# Patient Record
Sex: FEMALE | Race: WHITE | NOT HISPANIC OR LATINO | Employment: FULL TIME | URBAN - METROPOLITAN AREA
[De-identification: names, ages, dates, MRNs, and addresses within clinical notes are randomized per-mention and may not be internally consistent; named-entity substitution may affect disease eponyms.]

---

## 2018-07-13 ENCOUNTER — OFFICE VISIT (OUTPATIENT)
Dept: ORTHOPEDICS | Facility: CLINIC | Age: 58
End: 2018-07-13
Payer: COMMERCIAL

## 2018-07-13 VITALS — HEIGHT: 71 IN | WEIGHT: 199 LBS | BODY MASS INDEX: 27.86 KG/M2

## 2018-07-13 DIAGNOSIS — S52.552A OTHER CLOSED EXTRA-ARTICULAR FRACTURE OF DISTAL END OF LEFT RADIUS, INITIAL ENCOUNTER: Primary | ICD-10-CM

## 2018-07-13 DIAGNOSIS — S93.492A SPRAIN OF ANTERIOR TALOFIBULAR LIGAMENT OF LEFT ANKLE, INITIAL ENCOUNTER: ICD-10-CM

## 2018-07-13 PROCEDURE — 25600 CLTX DST RDL FX/EPHYS SEP WO: CPT | Mod: ,,, | Performed by: ORTHOPAEDIC SURGERY

## 2018-07-13 PROCEDURE — 3008F BODY MASS INDEX DOCD: CPT | Mod: ,,, | Performed by: ORTHOPAEDIC SURGERY

## 2018-07-13 PROCEDURE — 99203 OFFICE O/P NEW LOW 30 MIN: CPT | Mod: 25,,, | Performed by: ORTHOPAEDIC SURGERY

## 2018-07-13 RX ORDER — VITAMIN E 268 MG
400 CAPSULE ORAL DAILY
COMMUNITY

## 2018-07-13 RX ORDER — CHOLECALCIFEROL (VITAMIN D3) 50 MCG
1 TABLET ORAL DAILY
COMMUNITY

## 2018-07-13 NOTE — PROGRESS NOTES
History reviewed. No pertinent past medical history.    Past Surgical History:   Procedure Laterality Date    BREAST BIOPSY Right      SECTION      GALLBLADDER SURGERY  2011    KIDNEY STONE SURGERY  2006    [place] DILATION/CURETTAGE,DIAGNOSTIC      D & C times 2    SHOULDER SURGERY Right 2010    Reconstruction Dr. Madrigal       Current Outpatient Prescriptions   Medication Sig    cholecalciferol, vitamin D3, (VITAMIN D3) 2,000 unit Tab Take by mouth once daily.    cyanocobalamin-cobamamide (B12) 5,000-100 mcg Lozg Place under the tongue.    FLAXSEED OIL MISC by Misc.(Non-Drug; Combo Route) route.    multivitamin with minerals (HAIR,SKIN AND NAILS ORAL) Take by mouth.    ranitidine (ZANTAC) 150 MG tablet Take 150 mg by mouth 2 (two) times daily.    vitamin E 400 UNIT capsule Take 400 Units by mouth once daily.    zinc sulfate (ZINC-15 ORAL) Take by mouth.     No current facility-administered medications for this visit.        Review of patient's allergies indicates:  No Known Allergies    History reviewed. No pertinent family history.    Social History     Social History    Marital status:      Spouse name: N/A    Number of children: N/A    Years of education: N/A     Occupational History    Not on file.     Social History Main Topics    Smoking status: Never Smoker    Smokeless tobacco: Never Used    Alcohol use No    Drug use: No    Sexual activity: Not on file     Other Topics Concern    Not on file     Social History Narrative    No narrative on file       Chief Complaint:   Chief Complaint   Patient presents with    Initial Visit     DOI: 2018, 6 days ago, Patient had a fall on uneven sidewalk.  She went to [facility] ED and had xrays. Left Wrist FX and Left Ankle pain. She also has abrasions of the Right Knee and Right Elbow..        Consulting Physician: No ref. provider found    History of Present Illness:    This is a 57 y.o. year old female who complains of  "patient fell about 6 days ago injuring her left distal radius and wrist and her left ankle      ROS    Examination:    Vital Signs:    Vitals:    07/13/18 1133   Weight: 90.3 kg (199 lb)   Height: 5' 10.5" (1.791 m)   PainSc: 0-No pain   PainLoc: Wrist       This a well-developed, well nourished patient in no acute distress.    Alert and oriented and cooperative to examination.       Physical Exam: left wrist-patient has tenderness over the radial styloid area is no excessive swelling or contusion        Left ankle-atient hasn't bruising over the anterolateral aspect the ankle she some tenderness over the anterior talofibular ligament    Imaging:  AP x-ray of the left wrist showsa minimally displaced radial styloid fracture x-ray examination of the ankle and foot were negative for any fractures     Assessment: left radial styloid fracture and left ankle sprain    Plan:  We'll place her in a short short arm cast for about 3 weeks also continue on Ace wrap and some stretching exercises to the left ankle      DISCLAIMER: This note may have been dictated using voice recognition software and may contain grammatical errors.     NOTE: Consult report sent to referring provider via [employer] EMR.  "

## 2018-08-08 ENCOUNTER — OFFICE VISIT (OUTPATIENT)
Dept: ORTHOPEDICS | Facility: CLINIC | Age: 58
End: 2018-08-08
Payer: COMMERCIAL

## 2018-08-08 VITALS — HEIGHT: 70 IN | WEIGHT: 199 LBS | BODY MASS INDEX: 28.49 KG/M2

## 2018-08-08 DIAGNOSIS — S52.552A OTHER CLOSED EXTRA-ARTICULAR FRACTURE OF DISTAL END OF LEFT RADIUS, INITIAL ENCOUNTER: Primary | ICD-10-CM

## 2018-08-08 DIAGNOSIS — S93.492A SPRAIN OF ANTERIOR TALOFIBULAR LIGAMENT OF LEFT ANKLE, INITIAL ENCOUNTER: ICD-10-CM

## 2018-08-08 PROCEDURE — 3008F BODY MASS INDEX DOCD: CPT | Mod: ,,, | Performed by: ORTHOPAEDIC SURGERY

## 2018-08-08 PROCEDURE — 73100 X-RAY EXAM OF WRIST: CPT | Mod: FY,LT,, | Performed by: ORTHOPAEDIC SURGERY

## 2018-08-08 PROCEDURE — 99024 POSTOP FOLLOW-UP VISIT: CPT | Mod: ,,, | Performed by: ORTHOPAEDIC SURGERY

## 2018-08-08 RX ORDER — ASPIRIN 81 MG/1
81 TABLET ORAL DAILY
COMMUNITY

## 2018-08-08 RX ORDER — MAGNESIUM 30 MG
30 TABLET ORAL ONCE
COMMUNITY

## 2018-08-08 NOTE — PROGRESS NOTES
History reviewed. No pertinent past medical history.    Past Surgical History:   Procedure Laterality Date    BREAST BIOPSY Right      SECTION      GALLBLADDER SURGERY  2011    KIDNEY STONE SURGERY  2006    [place] DILATION/CURETTAGE,DIAGNOSTIC      D & C times 2    SHOULDER SURGERY Right 2010    Reconstruction Dr. Madrigal       Current Outpatient Prescriptions   Medication Sig    aspirin (ECOTRIN) 81 MG EC tablet Take 81 mg by mouth once daily.    magnesium 30 mg Tab Take by mouth once.    cholecalciferol, vitamin D3, (VITAMIN D3) 2,000 unit Tab Take by mouth once daily.    cyanocobalamin-cobamamide (B12) 5,000-100 mcg Lozg Place under the tongue.    FLAXSEED OIL MISC by Misc.(Non-Drug; Combo Route) route.    multivitamin with minerals (HAIR,SKIN AND NAILS ORAL) Take by mouth.    ranitidine (ZANTAC) 150 MG tablet Take 150 mg by mouth 2 (two) times daily.    vitamin E 400 UNIT capsule Take 400 Units by mouth once daily.    zinc sulfate (ZINC-15 ORAL) Take by mouth.     No current facility-administered medications for this visit.        Review of patient's allergies indicates:  No Known Allergies    History reviewed. No pertinent family history.    Social History     Social History    Marital status:      Spouse name: N/A    Number of children: N/A    Years of education: N/A     Occupational History    Not on file.     Social History Main Topics    Smoking status: Never Smoker    Smokeless tobacco: Never Used    Alcohol use No    Drug use: No    Sexual activity: Not on file     Other Topics Concern    Not on file     Social History Narrative    No narrative on file       Chief Complaint:   Chief Complaint   Patient presents with    Follow-up     DOI: 2018, 4wks, 1d s/p fall. sustained L radial styloid fx w/ L ankle sprain. non surgical intervention. Denies pain. Short arm cast remains in place, c/d/i. ankle support in place. compliant w/ all aspects of care.  "NADN, no additional complaints at this time.        Consulting Physician: No ref. provider found    History of Present Illness:    This is a 58 y.o. year old female who complains of patient is for 4/2 weeks status post start radial styloid fracture left wrist and left ankle sprain she's been cast for 3 weeks      ROS    Examination:    Vital Signs:    Vitals:    08/08/18 1533   Weight: 90.3 kg (199 lb)   Height: 5' 10" (1.778 m)       This a well-developed, well nourished patient in no acute distress.    Alert and oriented and cooperative to examination.       Physical Exam: left wrist-patient has some stiffness secondary to immobilization is less pain over the radial styloid      Left ankle patient has some slight tenderness over the anterior talofibular ligament is no gross instability present decreased swelling present    Imaging:  AP and lateral x-ray of the left wrist shows the radial styloid fracture healing in good position with callus formation no x-rays are taken of the ankle     Assessment: radial styloid fracture of left wrist and left ankle sprain doing well    Plan:  We'll fit her with a removable wrist splint she is to take it out to 3 ti a day and do some range of motion warm soaks and protected O see her back in 3 weeks      DISCLAIMER: This note may have been dictated using voice recognition software and may contain grammatical errors.     NOTE: Consult report sent to referring provider via [employer] EMR.  "

## 2018-10-10 ENCOUNTER — OFFICE VISIT (OUTPATIENT)
Dept: ORTHOPEDICS | Facility: CLINIC | Age: 58
End: 2018-10-10
Payer: COMMERCIAL

## 2018-10-10 VITALS — HEIGHT: 70 IN | WEIGHT: 199 LBS | BODY MASS INDEX: 28.49 KG/M2

## 2018-10-10 DIAGNOSIS — S93.492A SPRAIN OF ANTERIOR TALOFIBULAR LIGAMENT OF LEFT ANKLE, INITIAL ENCOUNTER: ICD-10-CM

## 2018-10-10 DIAGNOSIS — S52.552A OTHER CLOSED EXTRA-ARTICULAR FRACTURE OF DISTAL END OF LEFT RADIUS, INITIAL ENCOUNTER: Primary | ICD-10-CM

## 2018-10-10 PROCEDURE — 99024 POSTOP FOLLOW-UP VISIT: CPT | Mod: ,,, | Performed by: ORTHOPAEDIC SURGERY

## 2018-10-10 PROCEDURE — 73600 X-RAY EXAM OF ANKLE: CPT | Mod: FY,LT,, | Performed by: ORTHOPAEDIC SURGERY

## 2018-10-10 PROCEDURE — 3008F BODY MASS INDEX DOCD: CPT | Mod: ,,, | Performed by: ORTHOPAEDIC SURGERY

## 2018-10-10 PROCEDURE — 73100 X-RAY EXAM OF WRIST: CPT | Mod: FY,LT,, | Performed by: ORTHOPAEDIC SURGERY

## 2018-10-10 PROCEDURE — 99213 OFFICE O/P EST LOW 20 MIN: CPT | Mod: 24,,, | Performed by: ORTHOPAEDIC SURGERY

## 2018-10-10 NOTE — PROGRESS NOTES
History reviewed. No pertinent past medical history.    Past Surgical History:   Procedure Laterality Date    BREAST BIOPSY Right      SECTION      GALLBLADDER SURGERY  2011    KIDNEY STONE SURGERY  2006    [place] DILATION/CURETTAGE,DIAGNOSTIC      D & C times 2    SHOULDER SURGERY Right 2010    Reconstruction Dr. Madrigal       Current Outpatient Medications   Medication Sig    aspirin (ECOTRIN) 81 MG EC tablet Take 81 mg by mouth once daily.    cholecalciferol, vitamin D3, (VITAMIN D3) 2,000 unit Tab Take by mouth once daily.    cyanocobalamin-cobamamide (B12) 5,000-100 mcg Lozg Place under the tongue.    FLAXSEED OIL MISC by Misc.(Non-Drug; Combo Route) route.    magnesium 30 mg Tab Take by mouth once.    multivitamin with minerals (HAIR,SKIN AND NAILS ORAL) Take by mouth.    ranitidine (ZANTAC) 150 MG tablet Take 150 mg by mouth 2 (two) times daily.    vitamin E 400 UNIT capsule Take 400 Units by mouth once daily.    zinc sulfate (ZINC-15 ORAL) Take by mouth.     No current facility-administered medications for this visit.        Review of patient's allergies indicates:  No Known Allergies    History reviewed. No pertinent family history.    Social History     Socioeconomic History    Marital status:      Spouse name: Not on file    Number of children: Not on file    Years of education: Not on file    Highest education level: Not on file   Social Needs    Financial resource strain: Not on file    Food insecurity - worry: Not on file    Food insecurity - inability: Not on file    Transportation needs - medical: Not on file    Transportation needs - non-medical: Not on file   Occupational History    Not on file   Tobacco Use    Smoking status: Never Smoker    Smokeless tobacco: Never Used   Substance and Sexual Activity    Alcohol use: No    Drug use: No    Sexual activity: Not on file   Other Topics Concern    Not on file   Social History Narrative    Not on  "file       Chief Complaint:   Chief Complaint   Patient presents with    Fracture     DOI: 07/07/2018, 13.5 weeks, sustained L radial styloid fx w/ L ankle sprain. Patient is wearing Wrist brace and doing well. Her Left Ankle is fine, however she can not flex inward.        Consulting Physician: No ref. provider found    History of Present Illness:    This is a 58 y.o. year old female who complains of patient is 13-1/2 weeks status post left radial styloid fracture and a's patient also had a left ankle sprainhe is presently wearing a wrist splint the wrist is doing fine she complains of some discomfort when turning her ankle inward      ROS    Examination:    Vital Signs:    Vitals:    10/10/18 1502   Weight: 90.3 kg (199 lb)   Height: 5' 10" (1.778 m)   PainSc: 0-No pain   PainLoc: Wrist       This a well-developed, well nourished patient in no acute distress.    Alert and oriented and cooperative to examination.       Physical Exam: left wrist-patient has no tenderness or swelling or deformity of the left wrist        Left ankle-patient is good eversion inversion of the left ankle and good dorsi and lantar flexion there is no swelling or deformityhere is no instability    Imaging:  AP and lateral x-ray left wrist shows radial styloid fracture healing good positionAP and lateral x-ray left ankle shows more spew well-positioned and no evidence of any fractures A scaphoid view of the left wrist showed no evidence of any scaphoid fracture and no dissociation between the scaphoid and lunate    Assessment: healed left radial styloid fracture wrist and healed left ankle sprain    Plan:  Patient remove herself from the splint on the left wrist she is continue full weightbearing left ankle increase her activity with her hand and wrist as tolerated   Patient increase her activity with the left wrist also frontal time in about a month    DISCLAIMER: This note may have been dictated using voice recognition software and may " contain grammatical errors.     NOTE: Consult report sent to referring provider via [employer] EMR.

## 2020-11-21 ENCOUNTER — HOSPITAL ENCOUNTER (INPATIENT)
Facility: HOSPITAL | Age: 60
LOS: 4 days | Discharge: HOME | DRG: 177 | End: 2020-11-25
Attending: EMERGENCY MEDICINE | Admitting: STUDENT IN AN ORGANIZED HEALTH CARE EDUCATION/TRAINING PROGRAM
Payer: COMMERCIAL

## 2020-11-21 DIAGNOSIS — E08.65 DIABETES MELLITUS DUE TO UNDERLYING CONDITION WITH HYPERGLYCEMIA, WITHOUT LONG-TERM CURRENT USE OF INSULIN: ICD-10-CM

## 2020-11-21 DIAGNOSIS — J12.82 PNEUMONIA DUE TO COVID-19 VIRUS: Primary | ICD-10-CM

## 2020-11-21 DIAGNOSIS — R07.9 CHEST PAIN: ICD-10-CM

## 2020-11-21 DIAGNOSIS — U07.1 PNEUMONIA DUE TO COVID-19 VIRUS: Primary | ICD-10-CM

## 2020-11-21 DIAGNOSIS — R79.89 ELEVATED D-DIMER: ICD-10-CM

## 2020-11-21 DIAGNOSIS — R09.02 HYPOXIA: ICD-10-CM

## 2020-11-21 DIAGNOSIS — R06.02 SOB (SHORTNESS OF BREATH): ICD-10-CM

## 2020-11-21 DIAGNOSIS — Z20.822 SUSPECTED COVID-19 VIRUS INFECTION: ICD-10-CM

## 2020-11-21 LAB
ABO + RH BLD: NORMAL
ALBUMIN SERPL BCP-MCNC: 3.6 G/DL (ref 3.5–5.2)
ALLENS TEST: ABNORMAL
ALP SERPL-CCNC: 118 U/L (ref 55–135)
ALT SERPL W/O P-5'-P-CCNC: 62 U/L (ref 10–44)
ANION GAP SERPL CALC-SCNC: 12 MMOL/L (ref 8–16)
AST SERPL-CCNC: 51 U/L (ref 10–40)
BASOPHILS # BLD AUTO: 0.01 K/UL (ref 0–0.2)
BASOPHILS NFR BLD: 0.2 % (ref 0–1.9)
BILIRUB SERPL-MCNC: 0.6 MG/DL (ref 0.1–1)
BLD GP AB SCN CELLS X3 SERPL QL: NORMAL
BNP SERPL-MCNC: 74 PG/ML (ref 0–99)
BUN SERPL-MCNC: 12 MG/DL (ref 6–20)
CALCIUM SERPL-MCNC: 8.6 MG/DL (ref 8.7–10.5)
CHLORIDE SERPL-SCNC: 104 MMOL/L (ref 95–110)
CO2 SERPL-SCNC: 23 MMOL/L (ref 23–29)
CREAT SERPL-MCNC: 0.7 MG/DL (ref 0.5–1.4)
CRP SERPL-MCNC: 16.85 MG/DL
D DIMER PPP IA.FEU-MCNC: 0.6 UG/ML FEU
DELSYS: ABNORMAL
DIFFERENTIAL METHOD: ABNORMAL
EOSINOPHIL # BLD AUTO: 0 K/UL (ref 0–0.5)
EOSINOPHIL NFR BLD: 0.2 % (ref 0–8)
ERYTHROCYTE [DISTWIDTH] IN BLOOD BY AUTOMATED COUNT: 11.7 % (ref 11.5–14.5)
ERYTHROCYTE [SEDIMENTATION RATE] IN BLOOD BY WESTERGREN METHOD: 17 MM/HR (ref 0–20)
EST. GFR  (AFRICAN AMERICAN): >60 ML/MIN/1.73 M^2
EST. GFR  (NON AFRICAN AMERICAN): >60 ML/MIN/1.73 M^2
FERRITIN SERPL-MCNC: 534 NG/ML (ref 20–300)
GLUCOSE SERPL-MCNC: 190 MG/DL (ref 70–110)
GLUCOSE SERPL-MCNC: 287 MG/DL (ref 70–110)
GLUCOSE SERPL-MCNC: 367 MG/DL (ref 70–110)
GLUCOSE SERPL-MCNC: 395 MG/DL (ref 70–110)
GLUCOSE SERPL-MCNC: 478 MG/DL (ref 70–110)
GLUCOSE SERPL-MCNC: 484 MG/DL (ref 70–110)
HCO3 UR-SCNC: 24.1 MMOL/L (ref 24–28)
HCT VFR BLD AUTO: 37.1 % (ref 37–48.5)
HGB BLD-MCNC: 12.3 G/DL (ref 12–16)
IMM GRANULOCYTES # BLD AUTO: 0.06 K/UL (ref 0–0.04)
IMM GRANULOCYTES NFR BLD AUTO: 1.2 % (ref 0–0.5)
LACTATE SERPL-SCNC: 1.2 MMOL/L (ref 0.5–1.9)
LYMPHOCYTES # BLD AUTO: 1.1 K/UL (ref 1–4.8)
LYMPHOCYTES NFR BLD: 21.7 % (ref 18–48)
MCH RBC QN AUTO: 29.7 PG (ref 27–31)
MCHC RBC AUTO-ENTMCNC: 33.2 G/DL (ref 32–36)
MCV RBC AUTO: 90 FL (ref 82–98)
MODE: ABNORMAL
MONOCYTES # BLD AUTO: 0.4 K/UL (ref 0.3–1)
MONOCYTES NFR BLD: 7.2 % (ref 4–15)
NEUTROPHILS # BLD AUTO: 3.5 K/UL (ref 1.8–7.7)
NEUTROPHILS NFR BLD: 69.5 % (ref 38–73)
NRBC BLD-RTO: 0 /100 WBC
PCO2 BLDA: 39.5 MMHG (ref 35–45)
PH SMN: 7.39 [PH] (ref 7.35–7.45)
PLATELET # BLD AUTO: 235 K/UL (ref 150–350)
PMV BLD AUTO: 10.1 FL (ref 9.2–12.9)
PO2 BLDA: 71 MMHG (ref 80–100)
POC BE: -1 MMOL/L
POC SATURATED O2: 94 % (ref 95–100)
POC TCO2: 25 MMOL/L (ref 23–27)
POTASSIUM SERPL-SCNC: 3.7 MMOL/L (ref 3.5–5.1)
PROCALCITONIN SERPL IA-MCNC: <0.05 NG/ML (ref 0–0.5)
PROT SERPL-MCNC: 7.6 G/DL (ref 6–8.4)
RBC # BLD AUTO: 4.14 M/UL (ref 4–5.4)
SAMPLE: ABNORMAL
SARS-COV-2 RDRP RESP QL NAA+PROBE: POSITIVE
SITE: ABNORMAL
SODIUM SERPL-SCNC: 139 MMOL/L (ref 136–145)
TROPONIN I SERPL DL<=0.01 NG/ML-MCNC: <0.03 NG/ML
WBC # BLD AUTO: 4.98 K/UL (ref 3.9–12.7)

## 2020-11-21 PROCEDURE — 25000242 PHARM REV CODE 250 ALT 637 W/ HCPCS: Performed by: STUDENT IN AN ORGANIZED HEALTH CARE EDUCATION/TRAINING PROGRAM

## 2020-11-21 PROCEDURE — 36415 COLL VENOUS BLD VENIPUNCTURE: CPT

## 2020-11-21 PROCEDURE — 99285 EMERGENCY DEPT VISIT HI MDM: CPT | Mod: 25

## 2020-11-21 PROCEDURE — 93010 EKG 12-LEAD: ICD-10-PCS | Mod: ,,, | Performed by: INTERNAL MEDICINE

## 2020-11-21 PROCEDURE — 82728 ASSAY OF FERRITIN: CPT

## 2020-11-21 PROCEDURE — 27000221 HC OXYGEN, UP TO 24 HOURS

## 2020-11-21 PROCEDURE — 63600175 PHARM REV CODE 636 W HCPCS: Performed by: STUDENT IN AN ORGANIZED HEALTH CARE EDUCATION/TRAINING PROGRAM

## 2020-11-21 PROCEDURE — 84145 PROCALCITONIN (PCT): CPT

## 2020-11-21 PROCEDURE — 93010 ELECTROCARDIOGRAM REPORT: CPT | Mod: ,,, | Performed by: INTERNAL MEDICINE

## 2020-11-21 PROCEDURE — 80053 COMPREHEN METABOLIC PANEL: CPT

## 2020-11-21 PROCEDURE — 36600 WITHDRAWAL OF ARTERIAL BLOOD: CPT

## 2020-11-21 PROCEDURE — 25500020 PHARM REV CODE 255: Performed by: EMERGENCY MEDICINE

## 2020-11-21 PROCEDURE — 86140 C-REACTIVE PROTEIN: CPT

## 2020-11-21 PROCEDURE — 99900035 HC TECH TIME PER 15 MIN (STAT)

## 2020-11-21 PROCEDURE — 85651 RBC SED RATE NONAUTOMATED: CPT

## 2020-11-21 PROCEDURE — 83605 ASSAY OF LACTIC ACID: CPT

## 2020-11-21 PROCEDURE — 63600175 PHARM REV CODE 636 W HCPCS: Performed by: INTERNAL MEDICINE

## 2020-11-21 PROCEDURE — 86901 BLOOD TYPING SEROLOGIC RH(D): CPT

## 2020-11-21 PROCEDURE — 82947 ASSAY GLUCOSE BLOOD QUANT: CPT

## 2020-11-21 PROCEDURE — 85379 FIBRIN DEGRADATION QUANT: CPT

## 2020-11-21 PROCEDURE — 25000003 PHARM REV CODE 250: Performed by: STUDENT IN AN ORGANIZED HEALTH CARE EDUCATION/TRAINING PROGRAM

## 2020-11-21 PROCEDURE — U0002 COVID-19 LAB TEST NON-CDC: HCPCS

## 2020-11-21 PROCEDURE — 99900031 HC PATIENT EDUCATION (STAT)

## 2020-11-21 PROCEDURE — 25000242 PHARM REV CODE 250 ALT 637 W/ HCPCS: Performed by: EMERGENCY MEDICINE

## 2020-11-21 PROCEDURE — 84484 ASSAY OF TROPONIN QUANT: CPT

## 2020-11-21 PROCEDURE — 87040 BLOOD CULTURE FOR BACTERIA: CPT

## 2020-11-21 PROCEDURE — 83880 ASSAY OF NATRIURETIC PEPTIDE: CPT

## 2020-11-21 PROCEDURE — 82803 BLOOD GASES ANY COMBINATION: CPT

## 2020-11-21 PROCEDURE — 21400001 HC TELEMETRY ROOM

## 2020-11-21 PROCEDURE — 93005 ELECTROCARDIOGRAM TRACING: CPT | Performed by: INTERNAL MEDICINE

## 2020-11-21 PROCEDURE — 94761 N-INVAS EAR/PLS OXIMETRY MLT: CPT

## 2020-11-21 PROCEDURE — 94640 AIRWAY INHALATION TREATMENT: CPT

## 2020-11-21 PROCEDURE — 83036 HEMOGLOBIN GLYCOSYLATED A1C: CPT

## 2020-11-21 PROCEDURE — 85025 COMPLETE CBC W/AUTO DIFF WBC: CPT

## 2020-11-21 RX ORDER — ASPIRIN 81 MG/1
81 TABLET ORAL DAILY
Status: DISCONTINUED | OUTPATIENT
Start: 2020-11-21 | End: 2020-11-25 | Stop reason: HOSPADM

## 2020-11-21 RX ORDER — GLUCAGON 1 MG
1 KIT INJECTION
Status: DISCONTINUED | OUTPATIENT
Start: 2020-11-21 | End: 2020-11-25 | Stop reason: HOSPADM

## 2020-11-21 RX ORDER — SODIUM,POTASSIUM PHOSPHATES 280-250MG
2 POWDER IN PACKET (EA) ORAL
Status: DISCONTINUED | OUTPATIENT
Start: 2020-11-21 | End: 2020-11-25 | Stop reason: HOSPADM

## 2020-11-21 RX ORDER — GUAIFENESIN 600 MG/1
1200 TABLET, EXTENDED RELEASE ORAL 2 TIMES DAILY
COMMUNITY
End: 2023-01-19

## 2020-11-21 RX ORDER — SODIUM FLUORIDE 0.1 MG/ML
1 RINSE ORAL DAILY
COMMUNITY

## 2020-11-21 RX ORDER — LANOLIN ALCOHOL/MO/W.PET/CERES
800 CREAM (GRAM) TOPICAL
Status: DISCONTINUED | OUTPATIENT
Start: 2020-11-21 | End: 2020-11-25 | Stop reason: HOSPADM

## 2020-11-21 RX ORDER — ACETAMINOPHEN 325 MG/1
650 TABLET ORAL EVERY 4 HOURS PRN
Status: DISCONTINUED | OUTPATIENT
Start: 2020-11-21 | End: 2020-11-25 | Stop reason: HOSPADM

## 2020-11-21 RX ORDER — ENOXAPARIN SODIUM 100 MG/ML
40 INJECTION SUBCUTANEOUS EVERY 24 HOURS
Status: DISCONTINUED | OUTPATIENT
Start: 2020-11-21 | End: 2020-11-25 | Stop reason: HOSPADM

## 2020-11-21 RX ORDER — NAPROXEN 250 MG/1
500 TABLET ORAL 2 TIMES DAILY PRN
Status: DISCONTINUED | OUTPATIENT
Start: 2020-11-21 | End: 2020-11-25 | Stop reason: HOSPADM

## 2020-11-21 RX ORDER — SODIUM CHLORIDE 0.9 % (FLUSH) 0.9 %
10 SYRINGE (ML) INJECTION
Status: DISCONTINUED | OUTPATIENT
Start: 2020-11-21 | End: 2020-11-25 | Stop reason: HOSPADM

## 2020-11-21 RX ORDER — DEXTROMETHORPHAN HYDROBROMIDE AND GUAIFENESIN 10; 200 MG/1; MG/1
1 CAPSULE, GELATIN COATED ORAL
COMMUNITY
End: 2020-11-30

## 2020-11-21 RX ORDER — INSULIN ASPART 100 [IU]/ML
0-5 INJECTION, SOLUTION INTRAVENOUS; SUBCUTANEOUS
Status: DISCONTINUED | OUTPATIENT
Start: 2020-11-21 | End: 2020-11-21

## 2020-11-21 RX ORDER — DIPHENHYDRAMINE HCL 25 MG
25 CAPSULE ORAL EVERY 6 HOURS PRN
COMMUNITY

## 2020-11-21 RX ORDER — ONDANSETRON 4 MG/1
8 TABLET, ORALLY DISINTEGRATING ORAL EVERY 8 HOURS PRN
Status: DISCONTINUED | OUTPATIENT
Start: 2020-11-21 | End: 2020-11-25 | Stop reason: HOSPADM

## 2020-11-21 RX ORDER — MUPIROCIN 20 MG/G
OINTMENT TOPICAL 2 TIMES DAILY
COMMUNITY
Start: 2020-11-13

## 2020-11-21 RX ORDER — BENZONATATE 100 MG/1
100 CAPSULE ORAL 3 TIMES DAILY PRN
Status: DISCONTINUED | OUTPATIENT
Start: 2020-11-21 | End: 2020-11-22

## 2020-11-21 RX ORDER — POTASSIUM CHLORIDE 1.5 G/1.58G
40 POWDER, FOR SOLUTION ORAL
Status: DISCONTINUED | OUTPATIENT
Start: 2020-11-21 | End: 2020-11-25 | Stop reason: HOSPADM

## 2020-11-21 RX ORDER — ALBUTEROL SULFATE 90 UG/1
2 AEROSOL, METERED RESPIRATORY (INHALATION) EVERY 4 HOURS PRN
Status: DISCONTINUED | OUTPATIENT
Start: 2020-11-21 | End: 2020-11-25 | Stop reason: HOSPADM

## 2020-11-21 RX ORDER — ALBUTEROL SULFATE 90 UG/1
2 AEROSOL, METERED RESPIRATORY (INHALATION) ONCE
Status: COMPLETED | OUTPATIENT
Start: 2020-11-21 | End: 2020-11-21

## 2020-11-21 RX ORDER — TALC
6 POWDER (GRAM) TOPICAL NIGHTLY PRN
Status: DISCONTINUED | OUTPATIENT
Start: 2020-11-21 | End: 2020-11-25 | Stop reason: HOSPADM

## 2020-11-21 RX ORDER — IBUPROFEN 200 MG
24 TABLET ORAL
Status: DISCONTINUED | OUTPATIENT
Start: 2020-11-21 | End: 2020-11-25 | Stop reason: HOSPADM

## 2020-11-21 RX ORDER — IBUPROFEN 200 MG
16 TABLET ORAL
Status: DISCONTINUED | OUTPATIENT
Start: 2020-11-21 | End: 2020-11-25 | Stop reason: HOSPADM

## 2020-11-21 RX ORDER — ACETAMINOPHEN 325 MG/1
650 TABLET ORAL EVERY 8 HOURS PRN
Status: DISCONTINUED | OUTPATIENT
Start: 2020-11-21 | End: 2020-11-22

## 2020-11-21 RX ORDER — GUAIFENESIN 600 MG/1
600 TABLET, EXTENDED RELEASE ORAL 2 TIMES DAILY
Status: DISCONTINUED | OUTPATIENT
Start: 2020-11-21 | End: 2020-11-25 | Stop reason: HOSPADM

## 2020-11-21 RX ADMIN — INSULIN ASPART 5 UNITS: 100 INJECTION, SOLUTION INTRAVENOUS; SUBCUTANEOUS at 04:11

## 2020-11-21 RX ADMIN — IOHEXOL 100 ML: 350 INJECTION, SOLUTION INTRAVENOUS at 06:11

## 2020-11-21 RX ADMIN — ONDANSETRON 8 MG: 4 TABLET, ORALLY DISINTEGRATING ORAL at 02:11

## 2020-11-21 RX ADMIN — GUAIFENESIN 600 MG: 600 TABLET, EXTENDED RELEASE ORAL at 11:11

## 2020-11-21 RX ADMIN — ASPIRIN 81 MG: 81 TABLET, DELAYED RELEASE ORAL at 11:11

## 2020-11-21 RX ADMIN — REMDESIVIR 200 MG: 100 INJECTION, POWDER, LYOPHILIZED, FOR SOLUTION INTRAVENOUS at 01:11

## 2020-11-21 RX ADMIN — NAPROXEN 500 MG: 250 TABLET ORAL at 12:11

## 2020-11-21 RX ADMIN — GUAIFENESIN 600 MG: 600 TABLET, EXTENDED RELEASE ORAL at 09:11

## 2020-11-21 RX ADMIN — HUMAN INSULIN 18 UNITS: 100 INJECTION, SOLUTION SUBCUTANEOUS at 09:11

## 2020-11-21 RX ADMIN — ALBUTEROL SULFATE 2 PUFF: 90 AEROSOL, METERED RESPIRATORY (INHALATION) at 05:11

## 2020-11-21 RX ADMIN — ENOXAPARIN SODIUM 40 MG: 40 INJECTION SUBCUTANEOUS at 04:11

## 2020-11-21 RX ADMIN — BENZONATATE 100 MG: 100 CAPSULE ORAL at 11:11

## 2020-11-21 RX ADMIN — DEXAMETHASONE 6 MG: 2 TABLET ORAL at 11:11

## 2020-11-21 RX ADMIN — HUMAN INSULIN 15 UNITS: 100 INJECTION, SOLUTION SUBCUTANEOUS at 11:11

## 2020-11-21 NOTE — SUBJECTIVE & OBJECTIVE
History reviewed. No pertinent past medical history.    Past Surgical History:   Procedure Laterality Date    BREAST BIOPSY Right      SECTION      GALLBLADDER SURGERY  2011    KIDNEY STONE SURGERY  2006    [place] DILATION/CURETTAGE,DIAGNOSTIC      D & C times 2    SHOULDER SURGERY Right 2010    Reconstruction Dr. Madrigal       Review of patient's allergies indicates:   Allergen Reactions    Codeine Nausea And Vomiting       No current facility-administered medications on file prior to encounter.      Current Outpatient Medications on File Prior to Encounter   Medication Sig    aspirin (ECOTRIN) 81 MG EC tablet Take 81 mg by mouth once daily.    cholecalciferol, vitamin D3, (VITAMIN D3) 2,000 unit Tab Take by mouth once daily.    cyanocobalamin-cobamamide (B12) 5,000-100 mcg Lozg Place under the tongue.    FLAXSEED OIL MISC by Misc.(Non-Drug; Combo Route) route.    magnesium 30 mg Tab Take by mouth once.    multivitamin with minerals (HAIR,SKIN AND NAILS ORAL) Take by mouth.    ranitidine (ZANTAC) 150 MG tablet Take 150 mg by mouth 2 (two) times daily.    vitamin E 400 UNIT capsule Take 400 Units by mouth once daily.    zinc sulfate (ZINC-15 ORAL) Take by mouth.     Family History     None        Tobacco Use    Smoking status: Never Smoker    Smokeless tobacco: Never Used   Substance and Sexual Activity    Alcohol use: No    Drug use: No    Sexual activity: Not on file     Review of Systems   Constitutional: Positive for appetite change (decreased), chills, fatigue and fever.   HENT: Positive for congestion. Negative for rhinorrhea, sinus pressure and sore throat.    Eyes: Negative for photophobia and visual disturbance.   Respiratory: Positive for cough and shortness of breath. Negative for chest tightness and wheezing.    Cardiovascular: Negative for chest pain and leg swelling.   Gastrointestinal: Positive for nausea. Negative for abdominal pain, constipation, diarrhea and  vomiting.        Decreased taste and smell   Genitourinary: Negative for dysuria and urgency.   Musculoskeletal: Positive for myalgias. Negative for arthralgias.   Skin: Negative for rash.   Neurological: Positive for headaches. Negative for dizziness and weakness.   Psychiatric/Behavioral: Negative for behavioral problems. The patient is not nervous/anxious.      Objective:     Vital Signs (Most Recent):  Temp: 98 °F (36.7 °C) (11/21/20 0431)  Pulse: 89 (11/21/20 0634)  Resp: 20 (11/21/20 0550)  BP: (!) 141/68 (11/21/20 0634)  SpO2: 98 % (11/21/20 0634) Vital Signs (24h Range):  Temp:  [98 °F (36.7 °C)] 98 °F (36.7 °C)  Pulse:  [] 89  Resp:  [18-20] 20  SpO2:  [93 %-98 %] 98 %  BP: (141-167)/(68-76) 141/68     Weight: 96.2 kg (212 lb)  Body mass index is 30.42 kg/m².    Physical Exam  Vitals signs and nursing note reviewed.   Constitutional:       General: She is not in acute distress.     Appearance: She is well-developed. She is not ill-appearing, toxic-appearing or diaphoretic.   HENT:      Head: Normocephalic and atraumatic.   Eyes:      Conjunctiva/sclera: Conjunctivae normal.      Pupils: Pupils are equal, round, and reactive to light.   Neck:      Musculoskeletal: Normal range of motion and neck supple.      Thyroid: No thyromegaly.      Vascular: No JVD.   Cardiovascular:      Rate and Rhythm: Normal rate and regular rhythm.      Heart sounds: Normal heart sounds. No murmur. No friction rub. No gallop.    Pulmonary:      Effort: Pulmonary effort is normal.      Breath sounds: Rales present. No wheezing.      Comments: 2LNC  Abdominal:      General: Bowel sounds are normal. There is no distension.      Palpations: Abdomen is soft.      Tenderness: There is no abdominal tenderness.   Genitourinary:     Comments: No limon  Musculoskeletal: Normal range of motion.   Skin:     General: Skin is warm and dry.   Neurological:      General: No focal deficit present.      Mental Status: She is alert and  oriented to person, place, and time.      Deep Tendon Reflexes: Reflexes are normal and symmetric.   Psychiatric:         Mood and Affect: Mood normal.         Behavior: Behavior normal.            Significant Labs:   CBC:   Recent Labs   Lab 11/21/20  0516   WBC 4.98   HGB 12.3   HCT 37.1        CMP:   Recent Labs   Lab 11/21/20  0516      K 3.7      CO2 23   *   BUN 12   CREATININE 0.7   CALCIUM 8.6*   PROT 7.6   ALBUMIN 3.6   BILITOT 0.6   ALKPHOS 118   AST 51*   ALT 62*   ANIONGAP 12   EGFRNONAA >60.0     Cardiac Markers:   Recent Labs   Lab 11/21/20  0516   BNP 74     No results found for: CRP    No results found for: FERRITIN    Lab Results   Component Value Date    DDIMER 0.60 (HH) 11/21/2020       Recent Labs   Lab 11/21/20  0516   TROPONINI <0.030        ABG  Recent Labs   Lab 11/21/20  0738   PH 7.394   PO2 71*   PCO2 39.5   HCO3 24.1   BE -1     All pertinent labs within the past 24 hours have been reviewed.    Significant Imaging: I have reviewed all pertinent imaging results/findings within the past 24 hours.     Imaging Results          US Lower Extremity Veins Bilateral (Final result)  Result time 11/21/20 07:22:53    Final result by John Kingston Jr., MD (11/21/20 07:22:53)                 Narrative:    BILATERAL LOWER EXTREMITY VENOUS DOPPLER ULTRASOUND    HISTORY:  Elevated d-dimer. Covid positive    FINDINGS:   The deep venous system of the bilateral lower extremity  venous system was interrogated sonographically and targeted towards  the common femoral, deep femoral, superficial femoral, popliteal, and  distal extremity veins.  The saphenous vein system was also evaluated.  These vessels demonstrate satisfactory compressibility, augmentation  of flow, and normally directed Doppler signal at every individual  level.  There is no evidence of retrograde reflux within these vessels  nor organized fluid collection.    IMPRESSION:   Negative evaluation for deep venous  thrombosis  regarding the lower extremity deep venous system.    Electronically Signed by John COX on 11/21/2020 7:25 AM                             CTA Chest Non-Coronary (PE Study) (Final result)  Result time 11/21/20 06:41:43    Final result by John Kingston Jr., MD (11/21/20 06:41:43)                 Narrative:    CMS MANDATED QUALITY DATA-CT RADIATION-436    HISTORY: Patient document history positive d-dimer. Pulmonary embolus  suspected    FINDINGS: Thin axial imaging through the chest was performed with 100  cc IV contrast, with sagittal and coronal images, MIPS, and or 3D  reconstructions performed. All CT exams at this facility use dose  modulation, iterative reconstruction, and or weight based dosing when  appropriate to reduce radiation dose to as low as reasonably  achievable.    There is evidence of normal opacification of pulmonary arterial tree  out to the tertiary order branch vessels. There is no evidence of  saddle embolus at the branch point of the main pulmonary artery. No  evidence of truncation, or webbing, or pruning of the pulmonary  vascularity at any respective level. There is evidence of mild  enlargement of the cardiac chambers. Minimal thickening of the  ventricular myocardium. There is evidence of generalized ground glass  opacity changes distributed throughout the bilateral lungs most  pronounced the basilar segments findings that are suggestive active  Covid infection. Tracheal lumen appears midline without evidence of  any significant endobronchial lesions. The cardiac chambers maintain  normal size and overall contour and left ventricular myocardium  appears generous in thickness. The mediastinal structures evidence  fairly sizable lymph node at the level of the aortic arch measure 1.5  cm. This evidence of bulky adenopathy in the subcarinal station. A few  nodes in the right paratracheal station are noted. The thoracic spine  shows evidence of kyphotic alignment  with osteophytic spurring over  multiple levels. No evidence of gross osteoblastic or osteolytic  lesions.            IMPRESSION:  1. No evidence of acute cardiopulmonary embolism.  2. Diffuse alveolar opacities primarily at the lower lobes bilaterally  left side appears to be somewhat more involved suggestive of active  Covid infection or may reflect underlying active pneumonia.  3. Numerous nodes within several of the mediastinal stations that are  likely reactive.    Electronically Signed by John COX on 11/21/2020 6:52 AM                             X-Ray Chest AP Portable (Final result)  Result time 11/21/20 05:35:40    Final result by John Kingston Jr., MD (11/21/20 05:35:40)                 Narrative:    Examination: AP portable chest.    CLINICAL HISTORY: Suspected Covid infection.    COMPARISON: CT of the chest 11/21/2020.    FINDINGS: Relatively poor pulmonary expansion. There are mild diffuse  groundglass opacity changes about the periphery of the lungs that are  manifested based on the cross-sectional study. There is evidence of  increase interstitial markings projecting towards the diaphragmatic  surface. Findings are highly suspicious for active Covid infection.  There is evidence of a small anchors about the right humeral shaft.  Bones the chest are otherwise normal.    IMPRESSION: Diffuse groundglass opacity changes suggestive active  Covid infection most profound about the left lung base and peripheral  left midlung zone.    Electronically Signed by John COX on 11/21/2020 7:16 AM

## 2020-11-21 NOTE — H&P
[facility] Medicine  History & Physical    Patient Name: Savanna West  MRN: [id number]  Admission Date: 2020  Attending Physician: Rand Isbell DO   Primary Care Provider: Primary Doctor No         Patient information was obtained from patient, past medical records and ER records.     Subjective:     Principal Problem:Pneumonia due to COVID-19 virus    Chief Complaint:   Chief Complaint   Patient presents with    Shortness of Breath     COVID + dx . States feels breathing is worse.         HPI:  60-year-old female with past medical history of DVT (not currently on anticoagulation) presented to ED complaining of worsening cough, shortness of breath.  Patient states she was diagnosed with COVID on .  She states she was managing illness at home with over-the-counter medications including Mucinex, naproxen  however shortness of breath became severe overnight and she presented to emergency department.  Patient reports generalized fatigue, nasal congestion with rhinorrhea, subjective fever, chills, body aches, decreased appetite, headache, change of taste.  She denies chest pain, palpitations.    Per ED physician Dr. Fernandes, patient was hypoxic by ABG and required supplemental oxygen.    History reviewed. No pertinent past medical history.    Past Surgical History:   Procedure Laterality Date    BREAST BIOPSY Right      SECTION      GALLBLADDER SURGERY  2011    KIDNEY STONE SURGERY  2006    AR DILATION/CURETTAGE,DIAGNOSTIC      D & C times 2    SHOULDER SURGERY Right 2010    Reconstruction Dr. Madrigal       Review of patient's allergies indicates:   Allergen Reactions    Codeine Nausea And Vomiting       No current facility-administered medications on file prior to encounter.      Current Outpatient Medications on File Prior to Encounter   Medication Sig    aspirin (ECOTRIN) 81 MG EC tablet Take 81 mg by mouth once daily.     cholecalciferol, vitamin D3, (VITAMIN D3) 2,000 unit Tab Take by mouth once daily.    cyanocobalamin-cobamamide (B12) 5,000-100 mcg Lozg Place under the tongue.    FLAXSEED OIL MISC by Misc.(Non-Drug; Combo Route) route.    magnesium 30 mg Tab Take by mouth once.    multivitamin with minerals (HAIR,SKIN AND NAILS ORAL) Take by mouth.    ranitidine (ZANTAC) 150 MG tablet Take 150 mg by mouth 2 (two) times daily.    vitamin E 400 UNIT capsule Take 400 Units by mouth once daily.    zinc sulfate (ZINC-15 ORAL) Take by mouth.     Family History     Reviewed and noncontributory        Tobacco Use    Smoking status: Never Smoker    Smokeless tobacco: Never Used   Substance and Sexual Activity    Alcohol use: No    Drug use: No    Sexual activity: Not on file     Review of Systems   Constitutional: Positive for appetite change (decreased), chills, fatigue and fever.   HENT: Positive for congestion. Negative for rhinorrhea, sinus pressure and sore throat.    Eyes: Negative for photophobia and visual disturbance.   Respiratory: Positive for cough and shortness of breath. Negative for chest tightness and wheezing.    Cardiovascular: Negative for chest pain and leg swelling.   Gastrointestinal: Positive for nausea. Negative for abdominal pain, constipation, diarrhea and vomiting.        Decreased taste and smell   Genitourinary: Negative for dysuria and urgency.   Musculoskeletal: Positive for myalgias. Negative for arthralgias.   Skin: Negative for rash.   Neurological: Positive for headaches. Negative for dizziness and weakness.   Psychiatric/Behavioral: Negative for behavioral problems. The patient is not nervous/anxious.      Objective:     Vital Signs (Most Recent):  Temp: 98 °F (36.7 °C) (11/21/20 0431)  Pulse: 89 (11/21/20 0634)  Resp: 20 (11/21/20 0550)  BP: (!) 141/68 (11/21/20 0634)  SpO2: 98 % (11/21/20 0634) Vital Signs (24h Range):  Temp:  [98 °F (36.7 °C)] 98 °F (36.7 °C)  Pulse:  [] 89  Resp:   [18-20] 20  SpO2:  [93 %-98 %] 98 %  BP: (141-167)/(68-76) 141/68     Weight: 96.2 kg (212 lb)  Body mass index is 30.42 kg/m².    Physical Exam  Vitals signs and nursing note reviewed.   Constitutional:       General: She is not in acute distress.     Appearance: She is well-developed. She is not ill-appearing, toxic-appearing or diaphoretic.   HENT:      Head: Normocephalic and atraumatic.   Eyes:      Conjunctiva/sclera: Conjunctivae normal.      Pupils: Pupils are equal, round, and reactive to light.   Neck:      Musculoskeletal: Normal range of motion and neck supple.      Thyroid: No thyromegaly.      Vascular: No JVD.   Cardiovascular:      Rate and Rhythm: Normal rate and regular rhythm.      Heart sounds: Normal heart sounds. No murmur. No friction rub. No gallop.    Pulmonary:      Effort: Pulmonary effort is normal.      Breath sounds: Rales present. No wheezing.      Comments: 2LNC  Abdominal:      General: Bowel sounds are normal. There is no distension.      Palpations: Abdomen is soft.      Tenderness: There is no abdominal tenderness.   Genitourinary:     Comments: No limon  Musculoskeletal: Normal range of motion.   Skin:     General: Skin is warm and dry.   Neurological:      General: No focal deficit present.      Mental Status: She is alert and oriented to person, place, and time.      Deep Tendon Reflexes: Reflexes are normal and symmetric.   Psychiatric:         Mood and Affect: Mood normal.         Behavior: Behavior normal.            Significant Labs:   CBC:   Recent Labs   Lab 11/21/20  0516   WBC 4.98   HGB 12.3   HCT 37.1        CMP:   Recent Labs   Lab 11/21/20  0516      K 3.7      CO2 23   *   BUN 12   CREATININE 0.7   CALCIUM 8.6*   PROT 7.6   ALBUMIN 3.6   BILITOT 0.6   ALKPHOS 118   AST 51*   ALT 62*   ANIONGAP 12   EGFRNONAA >60.0     Cardiac Markers:   Recent Labs   Lab 11/21/20  0516   BNP 74       Lab Results   Component Value Date    DDIMER 0.60 (HH)  11/21/2020       Recent Labs   Lab 11/21/20  0516   TROPONINI <0.030        ABG  Recent Labs   Lab 11/21/20  0738   PH 7.394   PO2 71*   PCO2 39.5   HCO3 24.1   BE -1     All pertinent labs within the past 24 hours have been reviewed.    Significant Imaging: I have reviewed all pertinent imaging results/findings within the past 24 hours.     Imaging Results          US Lower Extremity Veins Bilateral (Final result)  Result time 11/21/20 07:22:53    Final result by John Kingston Jr., MD (11/21/20 07:22:53)                 Narrative:    BILATERAL LOWER EXTREMITY VENOUS DOPPLER ULTRASOUND    HISTORY:  Elevated d-dimer. Covid positive    FINDINGS:   The deep venous system of the bilateral lower extremity  venous system was interrogated sonographically and targeted towards  the common femoral, deep femoral, superficial femoral, popliteal, and  distal extremity veins.  The saphenous vein system was also evaluated.  These vessels demonstrate satisfactory compressibility, augmentation  of flow, and normally directed Doppler signal at every individual  level.  There is no evidence of retrograde reflux within these vessels  nor organized fluid collection.    IMPRESSION:   Negative evaluation for deep venous thrombosis  regarding the lower extremity deep venous system.    Electronically Signed by John COX on 11/21/2020 7:25 AM                             CTA Chest Non-Coronary (PE Study) (Final result)  Result time 11/21/20 06:41:43    Final result by John Kingston Jr., MD (11/21/20 06:41:43)                 Narrative:    CMS MANDATED QUALITY DATA-CT RADIATION-436    HISTORY: Patient document history positive d-dimer. Pulmonary embolus  suspected    FINDINGS: Thin axial imaging through the chest was performed with 100  cc IV contrast, with sagittal and coronal images, MIPS, and or 3D  reconstructions performed. All CT exams at this facility use dose  modulation, iterative reconstruction, and or weight based  dosing when  appropriate to reduce radiation dose to as low as reasonably  achievable.    There is evidence of normal opacification of pulmonary arterial tree  out to the tertiary order branch vessels. There is no evidence of  saddle embolus at the branch point of the main pulmonary artery. No  evidence of truncation, or webbing, or pruning of the pulmonary  vascularity at any respective level. There is evidence of mild  enlargement of the cardiac chambers. Minimal thickening of the  ventricular myocardium. There is evidence of generalized ground glass  opacity changes distributed throughout the bilateral lungs most  pronounced the basilar segments findings that are suggestive active  Covid infection. Tracheal lumen appears midline without evidence of  any significant endobronchial lesions. The cardiac chambers maintain  normal size and overall contour and left ventricular myocardium  appears generous in thickness. The mediastinal structures evidence  fairly sizable lymph node at the level of the aortic arch measure 1.5  cm. This evidence of bulky adenopathy in the subcarinal station. A few  nodes in the right paratracheal station are noted. The thoracic spine  shows evidence of kyphotic alignment with osteophytic spurring over  multiple levels. No evidence of gross osteoblastic or osteolytic  lesions.            IMPRESSION:  1. No evidence of acute cardiopulmonary embolism.  2. Diffuse alveolar opacities primarily at the lower lobes bilaterally  left side appears to be somewhat more involved suggestive of active  Covid infection or may reflect underlying active pneumonia.  3. Numerous nodes within several of the mediastinal stations that are  likely reactive.    Electronically Signed by John COX on 11/21/2020 6:52 AM                             X-Ray Chest AP Portable (Final result)  Result time 11/21/20 05:35:40    Final result by John Kingston Jr., MD (11/21/20 05:35:40)                 Narrative:     Examination: AP portable chest.    CLINICAL HISTORY: Suspected Covid infection.    COMPARISON: CT of the chest 11/21/2020.    FINDINGS: Relatively poor pulmonary expansion. There are mild diffuse  groundglass opacity changes about the periphery of the lungs that are  manifested based on the cross-sectional study. There is evidence of  increase interstitial markings projecting towards the diaphragmatic  surface. Findings are highly suspicious for active Covid infection.  There is evidence of a small anchors about the right humeral shaft.  Bones the chest are otherwise normal.    IMPRESSION: Diffuse groundglass opacity changes suggestive active  Covid infection most profound about the left lung base and peripheral  left midlung zone.    Electronically Signed by John COX on 11/21/2020 7:16 AM                                Assessment/Plan:     Active Hospital Problems    Diagnosis  POA    *Pneumonia due to COVID-19 virus [U07.1, J12.89]  Yes      Resolved Hospital Problems   No resolved problems to display.     Plan:  Symptomatic management for COVID-19 pneumonia   Supplemental oxygen to maintain SpO2 >92%  ID consulted for eval for convalescent plasma 2  type and screen ordered  Remdesivir (stared 11/21)   Dexamethasone PO 6 mg q24 hr started 11/21  Prn antipyretics, antitussives and inhalers   Replete electrolytes and monitor daily   Check and trend inflammatory markers including ferritin, D-dimer, LDH and CRP   Ddimer:  Corrected for age within normal limit.  CTA chest negative for PE, lower extremity ultrasound negative for DVT  Strict airborne, droplet and contact isolation precautions   Enoxaparin for VTE prophylaxis  Up to chair and proning encouraged  bg elevated, no hx of dm per patient, a1c ordered, low dose ssi, accuchecks      VTE Risk Mitigation (From admission, onward)         Ordered     enoxaparin injection 40 mg  Every 24 hours      11/21/20 1102     IP VTE HIGH RISK PATIENT  Once       11/21/20 1102     Place sequential compression device  Until discontinued      11/21/20 1102                       Rand Isbell DO  Department of Hospital Medicine   [facility]    Remdesivir FDA EUA Verbal Consent  Discussed with patient new BMJ/WHO recommendation that RDV is no longer recommended for treatment of covid but that the it is still FDA approved for use. Discussed risks vs benefits and patient would like to start RDV.     The patient has been provided with the remdesivir Fact Sheet for Patients and Parents/Caregivers and has been counseled that the FDA has authorized the emergency use of remdesivir, which is not an FDA approved drug. The significant known and potential risks and benefits are unknown. The patient or parent/caregiver has been given the option to accept or refuse and has verbally agreed to receive remdesivir. Daily labs will be ordered and monitoring for Serious Adverse Events will be performed.

## 2020-11-21 NOTE — ED NOTES
"Pt to ER c/o increased SOB and cough. Pt reports she tested positive for COVID on 11/12/20. Pt stated, "I felt really bad at first, had a couple days where I felt I was getting better and now I feel like I'm going backwards."   Pt SOB on exertion. O2 96% on RA, No respiratory distress noted.  +cough noted, BBS CTA,   "

## 2020-11-21 NOTE — LETTER
?  ,   ? Phone   ? Fax             Return to Work/School Status    Patient: Savanna West  YOB: 1960   Date: 11/24/2020      [facility] has adopted CDCs time-based return to work/school strategy for persons with confirmed or suspected COVID19. [facility] does not recommend using a test-based strategy for returning to work/school after COVID-19 infection. [facility] has reported prolonged positive test results without evidence of infectiousness. At this time, positive specimens capable of producing disease have not been isolated more than 9 days after onset of illness.   Symptomatic persons with confirmed COVID-19 or suspected COVID-19 can return to work/school after:    At least 3 days (72 hours) have passed since recovery defined as resolution of fever without the use of fever-reducing medications AND improvement in respiratory symptoms (e.g., cough, shortness of breath)    AND, at least 10 days have passed since first positive test  Asymptomatic persons with confirmed COVID-19 can return to work after:   At least 10 days have passed since the positive laboratory test and the person remains asymptomatic.  More information about the science behind the symptom-based return to work/school can be found at: https://www.cdc.gov/coronavirus/2019-ncov/community/vlxprmjv-aroqycfjbvr-hfskxhbdp.html      The patient was admitted on 11/21/20.  She is currently receiving treatment in the hospital and continues to have symptoms.  Her return to work date is unable to be determined at this time.     Sincerely,    CORA Lopez MD/kiran

## 2020-11-21 NOTE — PLAN OF CARE
11/21/20 1239   Patient Assessment/Suction   Level of Consciousness (AVPU) alert   Respiratory Effort Normal;Unlabored   Expansion/Accessory Muscles/Retractions expansion symmetric;no retractions;no use of accessory muscles   All Lung Fields Breath Sounds clear;equal bilaterally   PRE-TX-O2   O2 Device (Oxygen Therapy) nasal cannula   Flow (L/min) 2   SpO2 96 %   Pulse Oximetry Type Intermittent   $ Pulse Oximetry - Multiple Charge Pulse Oximetry - Multiple   Pulse 90   Resp 20   Inhaler   $ Inhaler Charges PRN treatment not required   Respiratory Evaluation   $ Care Plan Tech Time 15 min   Evaluation For New Orders   Admitting Diagnosis covid

## 2020-11-21 NOTE — ED PROVIDER NOTES
Encounter Date: 2020       History     Chief Complaint   Patient presents with    Shortness of Breath     COVID + dx . States feels breathing is worse.      60-year-old female with history of DVT in the past not currently on anticoagulation who tested positive for COVID on .  Patient reports her symptoms preceded this by several days she had traveled with a friend for the weekend who tested positive also on .  Patient reports she began with nasal congestion run runny nose sinus pressure fevers and body aches.  She reports that throughout the week she has had fevers chills sweats decreased appetite headaches some dizziness.  Both a dry and a wet cough.  Shortness of breath with exertion.  She reports she had been for beginning to feel better for the past several days and then today felt more short of breath.  She reports chest heaviness.  No specific pain when deep breathing.  No calf pain or swelling.  She reports she has essentially been in bed all week.  She has been using Mucinex and antipyretics        Review of patient's allergies indicates:   Allergen Reactions    Codeine Nausea And Vomiting     History reviewed. No pertinent past medical history.  Past Surgical History:   Procedure Laterality Date    BREAST BIOPSY Right      SECTION      GALLBLADDER SURGERY  2011    KIDNEY STONE SURGERY  2006    DE DILATION/CURETTAGE,DIAGNOSTIC      D & C times 2    SHOULDER SURGERY Right 2010    Reconstruction Dr. Madrigal     History reviewed. No pertinent family history.  Social History     Tobacco Use    Smoking status: Never Smoker    Smokeless tobacco: Never Used   Substance Use Topics    Alcohol use: No    Drug use: No     Review of Systems   Constitutional: Positive for activity change, appetite change and diaphoresis. Negative for chills, fatigue and fever.   HENT: Positive for congestion, rhinorrhea and sinus pressure. Negative for ear discharge,  ear pain, facial swelling, postnasal drip, sore throat, trouble swallowing and voice change.    Respiratory: Positive for cough and shortness of breath. Negative for chest tightness, wheezing and stridor.    Cardiovascular: Positive for chest pain. Negative for palpitations.   Gastrointestinal: Positive for nausea. Negative for abdominal distention, abdominal pain, blood in stool, constipation, diarrhea and vomiting.   Genitourinary: Negative for dysuria, flank pain, frequency, hematuria and urgency.   Musculoskeletal: Positive for arthralgias and myalgias. Negative for back pain, neck pain and neck stiffness.   Skin: Negative for rash.   Neurological: Positive for dizziness and headaches. Negative for syncope, weakness and light-headedness.   Hematological: Does not bruise/bleed easily.   Psychiatric/Behavioral: Negative for confusion. The patient is not nervous/anxious.    All other systems reviewed and are negative.      Physical Exam     Initial Vitals [11/21/20 0431]   BP Pulse Resp Temp SpO2   (!) 167/76 104 18 98 °F (36.7 °C) (!) 93 %      MAP       --         Physical Exam    Nursing note and vitals reviewed.  Constitutional: She appears well-developed and well-nourished. She is not diaphoretic. No distress.   HENT:   Head: Normocephalic and atraumatic.   Right Ear: External ear normal.   Left Ear: External ear normal.   Nose: Nose normal.   Mouth/Throat: Oropharynx is clear and moist. No oropharyngeal exudate.   Mild pharyngeal erythema, nasal turbinates erythema   Eyes: Conjunctivae and EOM are normal. Pupils are equal, round, and reactive to light.   Neck: Normal range of motion. Neck supple. No tracheal deviation present.   Cardiovascular: Normal rate, regular rhythm, normal heart sounds and intact distal pulses. Exam reveals no gallop and no friction rub.    No murmur heard.  148/76  Heart rate 104   Pulmonary/Chest: Breath sounds normal. No stridor. No respiratory distress. She has no wheezes. She has  no rhonchi. She has no rales. She exhibits no tenderness.   Sats 96-97% on room air, respirations 18, with ambulation sats decreased to 95% patient does become tachypneic and feel short of breath.   Abdominal: Soft. Bowel sounds are normal. She exhibits no distension and no mass. There is no abdominal tenderness. There is no rebound and no guarding.   Musculoskeletal: Normal range of motion. No edema.   Neurological: She is alert and oriented to person, place, and time. She has normal strength. No cranial nerve deficit or sensory deficit.   Skin: Skin is warm and dry. No rash noted. No erythema. No pallor.   Psychiatric: She has a normal mood and affect. Her behavior is normal. Judgment and thought content normal.         ED Course   Procedures  Labs Reviewed   CBC W/ AUTO DIFFERENTIAL   COMPREHENSIVE METABOLIC PANEL   TROPONIN I   D DIMER, QUANTITATIVE     EKG Readings: (Independently Interpreted)   Initial Reading: No STEMI. Rhythm: Normal Sinus Rhythm. Heart Rate: 89. Ectopy: No Ectopy. Conduction: Normal.       Imaging Results          X-Ray Chest AP Portable (In process)               X-Rays:   Independently Interpreted Readings:   Chest X-Ray: Normal heart size.  No infiltrates.  No acute abnormalities.     Medical Decision Making:   Independently Interpreted Test(s):   I have ordered and independently interpreted X-rays - see prior notes.  I have ordered and independently interpreted EKG Reading(s) - see prior notes  Clinical Tests:   Lab Tests: Ordered and Reviewed  The following lab test(s) were unremarkable: CBC  Radiological Study: Ordered and Reviewed  Medical Tests: Ordered and Reviewed  ED Management:  60-year-old female with history of DVT in the past not currently on anticoagulation who tested positive for COVID on November 12.  Patient reports her symptoms preceded this by several days she had traveled with a friend for the weekend who tested positive also on November 12.  Patient reports she began  with nasal congestion run runny nose sinus pressure fevers and body aches.  She has had fever chills sweats throughout the week, both wet and dry cough, and she has now developed shortness of breath and chest pain that is described as a heaviness.  She had been beginning to feel better.  And is now feeling worse.  Her vitals on arrival revealed a sat of 93% on room air in the room she was 96 to 97 % but did become short of breath.  I placed on 2 L oxygen for comfort.  Her breath sounds were clear.  She was mildly tachycardic at 1:04 a.m..  Blood pressure is mildly elevated on my evaluation at 1:48 a.m. systolic at triage did have a 167.  Patient was overall in good spirits friendly and nontoxic appearing.  Chest x-ray revealed no pulmonary infiltrates, EKG showed no ischemic changes, CBC is normal, CMP troponin and D-dimer are pending.  If D-dimer is elevated patient needs a CT scan of the chest with her history of COVID-19 infection and history of DVTs in the past not on anticoagulation with worsening symptoms after she originally improved.  Her care will be turned over to oncoming physician, Dr Fernandes,  at the end of my shift.  Diana Muller M.D.  5:50 AM 11/21/2020                               Clinical Impression:       ICD-10-CM ICD-9-CM   1. Suspected COVID-19 virus infection  Z20.828 V01.79                                               Diana Muller MD  11/21/20 0551

## 2020-11-21 NOTE — ED PROVIDER NOTES
Encounter Date: 2020       History     Chief Complaint   Patient presents with    Shortness of Breath     COVID + dx . States feels breathing is worse.       Shortness of Breath      COVID + dx . States feels breathing is worse.     60-year-old female with history of DVT in the past not currently on anticoagulation who tested positive for COVID on .  Patient reports her symptoms preceded this by several days she had traveled with a friend for the weekend who tested positive also on .  Patient reports she began with nasal congestion run runny nose sinus pressure fevers and body aches.  She reports that throughout the week she has had fevers chills sweats decreased appetite headaches some dizziness.  Both a dry and a wet cough.  Shortness of breath with exertion.  She reports she had been for beginning to feel better for the past several days and then today felt more short of breath.  She reports chest heaviness.  No specific pain when deep breathing.  No calf pain or swelling.  She reports she has essentially been in bed all week.  She has been using Mucinex and antipyretics             Review of patient's allergies indicates:   Allergen Reactions    Codeine Nausea And Vomiting     History reviewed. No pertinent past medical history.  Past Surgical History:   Procedure Laterality Date    BREAST BIOPSY Right      SECTION      GALLBLADDER SURGERY  2011    KIDNEY STONE SURGERY  2006    MD DILATION/CURETTAGE,DIAGNOSTIC      D & C times 2    SHOULDER SURGERY Right 2010    Reconstruction Dr. Madrigal     History reviewed. No pertinent family history.  Social History     Tobacco Use    Smoking status: Never Smoker    Smokeless tobacco: Never Used   Substance Use Topics    Alcohol use: No    Drug use: No     Review of Systems   Constitutional: Positive for activity change, appetite change, chills, fatigue and fever.   HENT: Positive for congestion,  postnasal drip and rhinorrhea. Negative for dental problem, ear pain, sinus pressure, sinus pain, sore throat and trouble swallowing.    Eyes: Negative.  Negative for photophobia, pain, redness and visual disturbance.   Respiratory: Positive for cough and shortness of breath. Negative for chest tightness and wheezing.    Cardiovascular: Negative.  Negative for chest pain, palpitations and leg swelling.   Gastrointestinal: Negative.  Negative for abdominal distention, abdominal pain, anal bleeding, blood in stool, constipation, diarrhea, nausea and vomiting.   Endocrine: Negative.    Genitourinary: Negative.  Negative for decreased urine volume, difficulty urinating, dysuria, flank pain, frequency, hematuria, pelvic pain and urgency.   Musculoskeletal: Positive for arthralgias and myalgias. Negative for back pain, gait problem, joint swelling, neck pain and neck stiffness.        Diffuse myalgias and arthralgias.  No localized or focal symptoms   Skin: Negative.  Negative for color change, pallor and rash.   Neurological: Positive for light-headedness and headaches. Negative for dizziness, tremors, seizures, syncope, facial asymmetry, speech difficulty, weakness and numbness.        Mild intermittent dull headaches.  Denies any severe headaches.  Denies associated neck pain or photophobia.  Headaches improved with fever control.  Has felt lightheaded at times when she has fever but denies syncope or near-syncope.  Denies any focal weakness numbness tingling or paresthesias.   Hematological: Negative.  Does not bruise/bleed easily.   Psychiatric/Behavioral: Negative.  Negative for confusion.   All other systems reviewed and are negative.      Physical Exam     Initial Vitals [11/21/20 0431]   BP Pulse Resp Temp SpO2   (!) 167/76 104 18 98 °F (36.7 °C) (!) 93 %      MAP       --         Physical Exam    Nursing note and vitals reviewed.  Constitutional: She appears well-developed and well-nourished. She is active and  cooperative.  Non-toxic appearance. She does not have a sickly appearance. She does not appear ill. No distress.   HENT:   Head: Normocephalic and atraumatic.   Right Ear: Tympanic membrane normal.   Left Ear: Tympanic membrane normal.   Nose: Nose normal.   Mouth/Throat: Uvula is midline, oropharynx is clear and moist and mucous membranes are normal. No oral lesions. No uvula swelling. No oropharyngeal exudate, posterior oropharyngeal edema or posterior oropharyngeal erythema.   Eyes: Conjunctivae, EOM and lids are normal. Pupils are equal, round, and reactive to light. No scleral icterus.   Neck: Trachea normal, normal range of motion, full passive range of motion without pain and phonation normal. Neck supple. No thyroid mass and no thyromegaly present. No stridor present. No spinous process tenderness and no muscular tenderness present. No tracheal deviation, no edema, no erythema and normal range of motion present. No neck rigidity. No JVD present.   Cardiovascular: Normal rate, regular rhythm, normal heart sounds, intact distal pulses and normal pulses. Exam reveals no gallop and no friction rub.    No murmur heard.  Pulmonary/Chest: Effort normal. No accessory muscle usage or stridor. No tachypnea. No respiratory distress. She has no wheezes. She has rhonchi. She has no rales.   Abdominal: Soft. Normal appearance and bowel sounds are normal. She exhibits no distension, no pulsatile midline mass and no mass. There is no abdominal tenderness. There is no rigidity, no guarding and no CVA tenderness.   Musculoskeletal: Normal range of motion. No tenderness or edema.      Comments: Pulses are 2+ throughout, cap refill is less than 2 sec throughout, extremities are nontender throughout with full range of motion. There is no spinal tenderness to palpation.   Lymphadenopathy:     She has no cervical adenopathy.   Neurological: She is alert and oriented to person, place, and time. She has normal strength. She displays  normal reflexes. No cranial nerve deficit or sensory deficit.   No focal deficits.   Skin: Skin is warm, dry and intact. Capillary refill takes less than 2 seconds. No ecchymosis, no petechiae and no rash noted. No erythema. No pallor.   Psychiatric: She has a normal mood and affect. Her speech is normal and behavior is normal. Judgment and thought content normal. Cognition and memory are normal.         ED Course   Procedures  Labs Reviewed   CBC W/ AUTO DIFFERENTIAL - Abnormal; Notable for the following components:       Result Value    Immature Granulocytes 1.2 (*)     Immature Grans (Abs) 0.06 (*)     All other components within normal limits   COMPREHENSIVE METABOLIC PANEL - Abnormal; Notable for the following components:    Glucose 287 (*)     Calcium 8.6 (*)     AST 51 (*)     ALT 62 (*)     All other components within normal limits   D DIMER, QUANTITATIVE - Abnormal; Notable for the following components:    D-Dimer 0.60 (*)     All other components within normal limits    Narrative:     DDimer critical result(s) called and verbal readback obtained from   Marry Rojas RN (ER) by SW1 11/21/2020 05:58   ISTAT PROCEDURE - Abnormal; Notable for the following components:    POC PO2 71 (*)     POC SATURATED O2 94 (*)     All other components within normal limits   CULTURE, BLOOD   CULTURE, BLOOD   TROPONIN I   B-TYPE NATRIURETIC PEPTIDE   B-TYPE NATRIURETIC PEPTIDE   LACTIC ACID, PLASMA   PROCALCITONIN          Imaging Results          US Lower Extremity Veins Bilateral (Final result)  Result time 11/21/20 07:22:53    Final result by John Kingston Jr., MD (11/21/20 07:22:53)                 Narrative:    BILATERAL LOWER EXTREMITY VENOUS DOPPLER ULTRASOUND    HISTORY:  Elevated d-dimer. Covid positive    FINDINGS:   The deep venous system of the bilateral lower extremity  venous system was interrogated sonographically and targeted towards  the common femoral, deep femoral, superficial femoral, popliteal,  and  distal extremity veins.  The saphenous vein system was also evaluated.  These vessels demonstrate satisfactory compressibility, augmentation  of flow, and normally directed Doppler signal at every individual  level.  There is no evidence of retrograde reflux within these vessels  nor organized fluid collection.    IMPRESSION:   Negative evaluation for deep venous thrombosis  regarding the lower extremity deep venous system.    Electronically Signed by John COX on 11/21/2020 7:25 AM                             CTA Chest Non-Coronary (PE Study) (Final result)  Result time 11/21/20 06:41:43    Final result by John Kingston Jr., MD (11/21/20 06:41:43)                 Narrative:    CMS MANDATED QUALITY DATA-CT RADIATION-436    HISTORY: Patient document history positive d-dimer. Pulmonary embolus  suspected    FINDINGS: Thin axial imaging through the chest was performed with 100  cc IV contrast, with sagittal and coronal images, MIPS, and or 3D  reconstructions performed. All CT exams at this facility use dose  modulation, iterative reconstruction, and or weight based dosing when  appropriate to reduce radiation dose to as low as reasonably  achievable.    There is evidence of normal opacification of pulmonary arterial tree  out to the tertiary order branch vessels. There is no evidence of  saddle embolus at the branch point of the main pulmonary artery. No  evidence of truncation, or webbing, or pruning of the pulmonary  vascularity at any respective level. There is evidence of mild  enlargement of the cardiac chambers. Minimal thickening of the  ventricular myocardium. There is evidence of generalized ground glass  opacity changes distributed throughout the bilateral lungs most  pronounced the basilar segments findings that are suggestive active  Covid infection. Tracheal lumen appears midline without evidence of  any significant endobronchial lesions. The cardiac chambers maintain  normal size and  overall contour and left ventricular myocardium  appears generous in thickness. The mediastinal structures evidence  fairly sizable lymph node at the level of the aortic arch measure 1.5  cm. This evidence of bulky adenopathy in the subcarinal station. A few  nodes in the right paratracheal station are noted. The thoracic spine  shows evidence of kyphotic alignment with osteophytic spurring over  multiple levels. No evidence of gross osteoblastic or osteolytic  lesions.            IMPRESSION:  1. No evidence of acute cardiopulmonary embolism.  2. Diffuse alveolar opacities primarily at the lower lobes bilaterally  left side appears to be somewhat more involved suggestive of active  Covid infection or may reflect underlying active pneumonia.  3. Numerous nodes within several of the mediastinal stations that are  likely reactive.    Electronically Signed by John COX on 11/21/2020 6:52 AM                             X-Ray Chest AP Portable (Final result)  Result time 11/21/20 05:35:40    Final result by John Kingston Jr., MD (11/21/20 05:35:40)                 Narrative:    Examination: AP portable chest.    CLINICAL HISTORY: Suspected Covid infection.    COMPARISON: CT of the chest 11/21/2020.    FINDINGS: Relatively poor pulmonary expansion. There are mild diffuse  groundglass opacity changes about the periphery of the lungs that are  manifested based on the cross-sectional study. There is evidence of  increase interstitial markings projecting towards the diaphragmatic  surface. Findings are highly suspicious for active Covid infection.  There is evidence of a small anchors about the right humeral shaft.  Bones the chest are otherwise normal.    IMPRESSION: Diffuse groundglass opacity changes suggestive active  Covid infection most profound about the left lung base and peripheral  left midlung zone.    Electronically Signed by John COX on 11/21/2020 7:16 AM                                Medical Decision Making:   Clinical Tests:   Lab Tests: Reviewed  Radiological Study: Reviewed  Medical Tests: Reviewed  ED Management:  Patient with recent diagnosis of COVID-19 presents secondary to shortness of breath.  D-dimer was elevated.  CT scan of the chest is negative for pulmonary embolism but does demonstrate evidence of COVID pneumonia.  Pulse ox noted to be 91-92% on room air but does improve with supplemental oxygen as does her sensation of feeling short of breath and winded.  Pao2. to noted to be 71 on arterial blood gas.  Will discuss with hospitalist for admission.  Patient previously evaluated in the emergency room by Dr. Muller  The patient's care was turned over to me.  I have completed the final care and disposition of the patient.                             Clinical Impression:       ICD-10-CM ICD-9-CM   1. SOB (shortness of breath)  R06.02 786.05   2. Suspected COVID-19 virus infection  Z20.828 V01.79   3. Elevated d-dimer  R79.89 790.92   4. Hypoxia  R09.02 799.02   5. Pneumonia due to COVID-19 virus  U07.1 480.8    J12.89                                                Ale Fernandes MD  11/21/20 0807

## 2020-11-21 NOTE — PLAN OF CARE
11/21/20 0550   Patient Assessment/Suction   Level of Consciousness (AVPU) alert   Respiratory Effort Short of breath   Expansion/Accessory Muscles/Retractions no use of accessory muscles   All Lung Fields Breath Sounds clear   Rhythm/Pattern, Respiratory shortness of breath   Cough Frequency frequent   Cough Type dry;nonproductive   PRE-TX-O2   O2 Device (Oxygen Therapy) nasal cannula   $ Is the patient on Low Flow Oxygen? Yes   Flow (L/min) 2   SpO2 98 %   Pulse 88   Resp 20   Inhaler   $ Inhaler Charges MDI (Metered Dose Inahler) Treatment;Given With Spacer;Spacer - Equipment   Daily Review of Necessity (Inhaler) completed   Respiratory Treatment Status (Inhaler) given   Treatment Route (Inhaler) spacer/holding chamber   Patient Position (Inhaler) HOB elevated   Post Treatment Assessment (Inhaler) breath sounds unchanged   Signs of Intolerance (Inhaler) none   Education   $ Education Bronchodilator;Other (see comment);15 min   Respiratory Evaluation   $ Care Plan Tech Time 15 min

## 2020-11-22 PROBLEM — R10.9 FLANK PAIN: Status: ACTIVE | Noted: 2020-11-22

## 2020-11-22 LAB
ANION GAP SERPL CALC-SCNC: 11 MMOL/L (ref 8–16)
BACTERIA #/AREA URNS HPF: NEGATIVE /HPF
BASOPHILS # BLD AUTO: 0.01 K/UL (ref 0–0.2)
BASOPHILS NFR BLD: 0.2 % (ref 0–1.9)
BILIRUB UR QL STRIP: NEGATIVE
BLD PROD TYP BPU: NORMAL
BLD PROD TYP BPU: NORMAL
BLOOD UNIT EXPIRATION DATE: NORMAL
BLOOD UNIT EXPIRATION DATE: NORMAL
BLOOD UNIT TYPE CODE: 6200
BLOOD UNIT TYPE CODE: 6200
BLOOD UNIT TYPE: NORMAL
BLOOD UNIT TYPE: NORMAL
BUN SERPL-MCNC: 20 MG/DL (ref 6–20)
CALCIUM SERPL-MCNC: 8.8 MG/DL (ref 8.7–10.5)
CHLORIDE SERPL-SCNC: 102 MMOL/L (ref 95–110)
CLARITY UR: CLEAR
CO2 SERPL-SCNC: 23 MMOL/L (ref 23–29)
CODING SYSTEM: NORMAL
CODING SYSTEM: NORMAL
COLOR UR: YELLOW
CREAT SERPL-MCNC: 0.7 MG/DL (ref 0.5–1.4)
CRP SERPL-MCNC: 15.9 MG/DL
D DIMER PPP IA.FEU-MCNC: 0.47 UG/ML FEU
DIFFERENTIAL METHOD: ABNORMAL
DISPENSE STATUS: NORMAL
DISPENSE STATUS: NORMAL
EOSINOPHIL # BLD AUTO: 0 K/UL (ref 0–0.5)
EOSINOPHIL NFR BLD: 0 % (ref 0–8)
ERYTHROCYTE [DISTWIDTH] IN BLOOD BY AUTOMATED COUNT: 11.8 % (ref 11.5–14.5)
ERYTHROCYTE [SEDIMENTATION RATE] IN BLOOD BY WESTERGREN METHOD: 54 MM/HR (ref 0–20)
EST. GFR  (AFRICAN AMERICAN): >60 ML/MIN/1.73 M^2
EST. GFR  (NON AFRICAN AMERICAN): >60 ML/MIN/1.73 M^2
ESTIMATED AVG GLUCOSE: 226 MG/DL (ref 68–131)
FERRITIN SERPL-MCNC: 529 NG/ML (ref 20–300)
GLUCOSE SERPL-MCNC: 206 MG/DL (ref 70–110)
GLUCOSE SERPL-MCNC: 244 MG/DL (ref 70–110)
GLUCOSE SERPL-MCNC: 301 MG/DL (ref 70–110)
GLUCOSE SERPL-MCNC: 374 MG/DL (ref 70–110)
GLUCOSE SERPL-MCNC: 393 MG/DL (ref 70–110)
GLUCOSE UR QL STRIP: ABNORMAL
HBA1C MFR BLD HPLC: 9.5 % (ref 4.5–6.2)
HCT VFR BLD AUTO: 36.1 % (ref 37–48.5)
HGB BLD-MCNC: 11.9 G/DL (ref 12–16)
HGB UR QL STRIP: NEGATIVE
HYALINE CASTS #/AREA URNS LPF: 7 /LPF
IMM GRANULOCYTES # BLD AUTO: 0.07 K/UL (ref 0–0.04)
IMM GRANULOCYTES NFR BLD AUTO: 1.4 % (ref 0–0.5)
KETONES UR QL STRIP: ABNORMAL
LEUKOCYTE ESTERASE UR QL STRIP: NEGATIVE
LYMPHOCYTES # BLD AUTO: 0.9 K/UL (ref 1–4.8)
LYMPHOCYTES NFR BLD: 17.6 % (ref 18–48)
MAGNESIUM SERPL-MCNC: 2 MG/DL (ref 1.6–2.6)
MCH RBC QN AUTO: 30.3 PG (ref 27–31)
MCHC RBC AUTO-ENTMCNC: 33 G/DL (ref 32–36)
MCV RBC AUTO: 92 FL (ref 82–98)
MICROSCOPIC COMMENT: ABNORMAL
MONOCYTES # BLD AUTO: 0.4 K/UL (ref 0.3–1)
MONOCYTES NFR BLD: 8.6 % (ref 4–15)
NEUTROPHILS # BLD AUTO: 3.5 K/UL (ref 1.8–7.7)
NEUTROPHILS NFR BLD: 72.2 % (ref 38–73)
NITRITE UR QL STRIP: NEGATIVE
NRBC BLD-RTO: 0 /100 WBC
PH UR STRIP: 6 [PH] (ref 5–8)
PHOSPHATE SERPL-MCNC: 2.8 MG/DL (ref 2.7–4.5)
PLATELET # BLD AUTO: 284 K/UL (ref 150–350)
PMV BLD AUTO: 10.3 FL (ref 9.2–12.9)
POTASSIUM SERPL-SCNC: 4.1 MMOL/L (ref 3.5–5.1)
PROT UR QL STRIP: ABNORMAL
RBC # BLD AUTO: 3.93 M/UL (ref 4–5.4)
RBC #/AREA URNS HPF: 1 /HPF (ref 0–4)
SODIUM SERPL-SCNC: 136 MMOL/L (ref 136–145)
SP GR UR STRIP: >1.03 (ref 1–1.03)
SQUAMOUS #/AREA URNS HPF: 2 /HPF
TROPONIN I SERPL DL<=0.01 NG/ML-MCNC: <0.03 NG/ML
UNIT NUMBER: NORMAL
UNIT NUMBER: NORMAL
URN SPEC COLLECT METH UR: ABNORMAL
UROBILINOGEN UR STRIP-ACNC: NEGATIVE EU/DL
WBC # BLD AUTO: 4.9 K/UL (ref 3.9–12.7)
WBC #/AREA URNS HPF: 2 /HPF (ref 0–5)
YEAST URNS QL MICRO: ABNORMAL

## 2020-11-22 PROCEDURE — 27000221 HC OXYGEN, UP TO 24 HOURS

## 2020-11-22 PROCEDURE — 85025 COMPLETE CBC W/AUTO DIFF WBC: CPT

## 2020-11-22 PROCEDURE — 63600175 PHARM REV CODE 636 W HCPCS: Performed by: STUDENT IN AN ORGANIZED HEALTH CARE EDUCATION/TRAINING PROGRAM

## 2020-11-22 PROCEDURE — 81001 URINALYSIS AUTO W/SCOPE: CPT

## 2020-11-22 PROCEDURE — 99223 PR INITIAL HOSPITAL CARE,LEVL III: ICD-10-PCS | Mod: ,,, | Performed by: INTERNAL MEDICINE

## 2020-11-22 PROCEDURE — 63600175 PHARM REV CODE 636 W HCPCS: Performed by: INTERNAL MEDICINE

## 2020-11-22 PROCEDURE — C9399 UNCLASSIFIED DRUGS OR BIOLOG: HCPCS | Performed by: STUDENT IN AN ORGANIZED HEALTH CARE EDUCATION/TRAINING PROGRAM

## 2020-11-22 PROCEDURE — 25000242 PHARM REV CODE 250 ALT 637 W/ HCPCS: Performed by: STUDENT IN AN ORGANIZED HEALTH CARE EDUCATION/TRAINING PROGRAM

## 2020-11-22 PROCEDURE — 36415 COLL VENOUS BLD VENIPUNCTURE: CPT

## 2020-11-22 PROCEDURE — 85379 FIBRIN DEGRADATION QUANT: CPT

## 2020-11-22 PROCEDURE — 84100 ASSAY OF PHOSPHORUS: CPT

## 2020-11-22 PROCEDURE — 82728 ASSAY OF FERRITIN: CPT

## 2020-11-22 PROCEDURE — 94761 N-INVAS EAR/PLS OXIMETRY MLT: CPT

## 2020-11-22 PROCEDURE — 80048 BASIC METABOLIC PNL TOTAL CA: CPT

## 2020-11-22 PROCEDURE — 25000003 PHARM REV CODE 250: Performed by: INTERNAL MEDICINE

## 2020-11-22 PROCEDURE — 83735 ASSAY OF MAGNESIUM: CPT

## 2020-11-22 PROCEDURE — 25000003 PHARM REV CODE 250: Performed by: STUDENT IN AN ORGANIZED HEALTH CARE EDUCATION/TRAINING PROGRAM

## 2020-11-22 PROCEDURE — 99223 1ST HOSP IP/OBS HIGH 75: CPT | Mod: ,,, | Performed by: INTERNAL MEDICINE

## 2020-11-22 PROCEDURE — 84484 ASSAY OF TROPONIN QUANT: CPT

## 2020-11-22 PROCEDURE — 99900035 HC TECH TIME PER 15 MIN (STAT)

## 2020-11-22 PROCEDURE — 85651 RBC SED RATE NONAUTOMATED: CPT

## 2020-11-22 PROCEDURE — 86140 C-REACTIVE PROTEIN: CPT

## 2020-11-22 PROCEDURE — 21400001 HC TELEMETRY ROOM

## 2020-11-22 PROCEDURE — 94640 AIRWAY INHALATION TREATMENT: CPT

## 2020-11-22 PROCEDURE — 25000242 PHARM REV CODE 250 ALT 637 W/ HCPCS: Performed by: INTERNAL MEDICINE

## 2020-11-22 RX ORDER — HYDROCODONE POLISTIREX AND CHLORPHENIRAMINE POLISTIREX 10; 8 MG/5ML; MG/5ML
5 SUSPENSION, EXTENDED RELEASE ORAL NIGHTLY PRN
Status: DISCONTINUED | OUTPATIENT
Start: 2020-11-22 | End: 2020-11-25 | Stop reason: HOSPADM

## 2020-11-22 RX ORDER — ACETAMINOPHEN 500 MG
1000 TABLET ORAL
Status: DISCONTINUED | OUTPATIENT
Start: 2020-11-22 | End: 2020-11-25 | Stop reason: HOSPADM

## 2020-11-22 RX ORDER — HYDROCODONE BITARTRATE AND ACETAMINOPHEN 500; 5 MG/1; MG/1
TABLET ORAL
Status: DISCONTINUED | OUTPATIENT
Start: 2020-11-22 | End: 2020-11-25 | Stop reason: HOSPADM

## 2020-11-22 RX ORDER — DIPHENHYDRAMINE HYDROCHLORIDE 50 MG/ML
25 INJECTION INTRAMUSCULAR; INTRAVENOUS
Status: DISCONTINUED | OUTPATIENT
Start: 2020-11-22 | End: 2020-11-25 | Stop reason: HOSPADM

## 2020-11-22 RX ORDER — TRAMADOL HYDROCHLORIDE 50 MG/1
50 TABLET ORAL EVERY 6 HOURS PRN
Status: DISCONTINUED | OUTPATIENT
Start: 2020-11-22 | End: 2020-11-25 | Stop reason: HOSPADM

## 2020-11-22 RX ORDER — BENZONATATE 100 MG/1
200 CAPSULE ORAL 3 TIMES DAILY
Status: DISCONTINUED | OUTPATIENT
Start: 2020-11-22 | End: 2020-11-25 | Stop reason: HOSPADM

## 2020-11-22 RX ORDER — ALBUTEROL SULFATE 90 UG/1
2 AEROSOL, METERED RESPIRATORY (INHALATION)
Status: DISCONTINUED | OUTPATIENT
Start: 2020-11-22 | End: 2020-11-25

## 2020-11-22 RX ADMIN — BENZONATATE 200 MG: 100 CAPSULE ORAL at 02:11

## 2020-11-22 RX ADMIN — ENOXAPARIN SODIUM 40 MG: 40 INJECTION SUBCUTANEOUS at 05:11

## 2020-11-22 RX ADMIN — DIPHENHYDRAMINE HYDROCHLORIDE 25 MG: 50 INJECTION INTRAMUSCULAR; INTRAVENOUS at 06:11

## 2020-11-22 RX ADMIN — ALBUTEROL SULFATE 2 PUFF: 90 AEROSOL, METERED RESPIRATORY (INHALATION) at 08:11

## 2020-11-22 RX ADMIN — DEXAMETHASONE 6 MG: 2 TABLET ORAL at 08:11

## 2020-11-22 RX ADMIN — HUMAN INSULIN 15 UNITS: 100 INJECTION, SOLUTION SUBCUTANEOUS at 04:11

## 2020-11-22 RX ADMIN — ASPIRIN 81 MG: 81 TABLET, DELAYED RELEASE ORAL at 08:11

## 2020-11-22 RX ADMIN — GUAIFENESIN 600 MG: 600 TABLET, EXTENDED RELEASE ORAL at 08:11

## 2020-11-22 RX ADMIN — NAPROXEN 500 MG: 250 TABLET ORAL at 04:11

## 2020-11-22 RX ADMIN — HUMAN INSULIN 12 UNITS: 100 INJECTION, SOLUTION SUBCUTANEOUS at 09:11

## 2020-11-22 RX ADMIN — BENZONATATE 100 MG: 100 CAPSULE ORAL at 08:11

## 2020-11-22 RX ADMIN — ALBUTEROL SULFATE 2 PUFF: 90 AEROSOL, METERED RESPIRATORY (INHALATION) at 01:11

## 2020-11-22 RX ADMIN — HUMAN INSULIN 6 UNITS: 100 INJECTION, SOLUTION SUBCUTANEOUS at 08:11

## 2020-11-22 RX ADMIN — ACETAMINOPHEN 1000 MG: 500 TABLET, FILM COATED ORAL at 06:11

## 2020-11-22 RX ADMIN — BENZONATATE 200 MG: 100 CAPSULE ORAL at 08:11

## 2020-11-22 RX ADMIN — TRAMADOL HYDROCHLORIDE 50 MG: 50 TABLET, FILM COATED ORAL at 02:11

## 2020-11-22 RX ADMIN — INSULIN DETEMIR 10 UNITS: 100 INJECTION, SOLUTION SUBCUTANEOUS at 09:11

## 2020-11-22 RX ADMIN — HUMAN INSULIN 12 UNITS: 100 INJECTION, SOLUTION SUBCUTANEOUS at 12:11

## 2020-11-22 RX ADMIN — REMDESIVIR 100 MG: 100 INJECTION, POWDER, LYOPHILIZED, FOR SOLUTION INTRAVENOUS at 12:11

## 2020-11-22 NOTE — PLAN OF CARE
11/22/20 1534   Discharge Assessment   Assessment Type Discharge Planning Assessment   Confirmed/corrected address and phone number on facesheet? Yes   Assessment information obtained from? Patient;Medical Record   Prior to hospitilization cognitive status: Alert/Oriented   Prior to hospitalization functional status: Independent   Current cognitive status: Alert/Oriented   Current Functional Status: Independent   Facility Arrived From: Home   Lives With spouse   Who are your caregiver(s) and their phone number(s)? Spouse Jovita West [phone]   Readmission Within the Last 30 Days no previous admission in last 30 days   Patient currently being followed by outpatient case management? No   Patient currently receives any other outside agency services? No   Equipment Currently Used at Home none   Do you have any problems affording any of your prescribed medications? No   Is the patient taking medications as prescribed? yes   Does the patient have transportation home? Yes   Transportation Anticipated family or friend will provide   Does the patient receive services at the [facility]? No   Discharge Plan A Home with family   Discharge Plan B Home with family   DME Needed Upon Discharge  none   Patient/Family in Agreement with Plan yes   Pt does not have PCP. Cm will continue to follow for further discharge planning needs

## 2020-11-22 NOTE — CARE UPDATE
11/21/20 9110   Patient Assessment/Suction   Level of Consciousness (AVPU) alert   Respiratory Effort Unlabored   Expansion/Accessory Muscles/Retractions no use of accessory muscles   All Lung Fields Breath Sounds clear;diminished   Rhythm/Pattern, Respiratory no shortness of breath reported   Cough Frequency no cough   PRE-TX-O2   O2 Device (Oxygen Therapy) nasal cannula   $ Is the patient on Low Flow Oxygen? Yes   Flow (L/min) 2   SpO2 (!) 94 %   Pulse Oximetry Type Intermittent   $ Pulse Oximetry - Multiple Charge Pulse Oximetry - Multiple   Pulse 75   Resp 20   Positioning   Head of Bed (HOB) HOB elevated;HOB at 30 degrees   Respiratory Evaluation   $ Care Plan Tech Time 15 min

## 2020-11-22 NOTE — PROGRESS NOTES
[facility] Medicine  Progress Note    Patient Name: Savanna West  MRN: [id number]  Patient Class: IP- Inpatient   Admission Date: 11/21/2020  Length of Stay: 1 days  Attending Physician: Rand Isbell DO  Primary Care Provider: Primary Doctor No        Subjective:     Principal Problem:Pneumonia due to COVID-19 virus  Chief Complaint   Patient presents with    Shortness of Breath     COVID + dx Nov 12th. States feels breathing is worse.        Interval History:   Afvss, sating 92-95% on 2LNC. ID consulted for cp.   Patient seen and examined.   Today she reports right side flank pain intermittent, moderate, no associated with eating. Hx of cholecystectomy.    +cough, ZAIDI  (-) cp, n/v/d, f/c     Review of Systems   As above  Objective:     Vital Signs (Most Recent):  Temp: 98.1 °F (36.7 °C) (11/22/20 1100)  Pulse: 80 (11/22/20 1338)  Resp: 18 (11/22/20 1410)  BP: 118/71 (11/22/20 1100)  SpO2: (!) 94 % (11/22/20 1338) Vital Signs (24h Range):  Temp:  [97.9 °F (36.6 °C)-98.8 °F (37.1 °C)] 98.1 °F (36.7 °C)  Pulse:  [74-88] 80  Resp:  [16-20] 18  SpO2:  [92 %-95 %] 94 %  BP: (107-147)/(60-85) 118/71     Weight: 93.6 kg (206 lb 4.8 oz)  Body mass index is 29.6 kg/m².    Intake/Output Summary (Last 24 hours) at 11/22/2020 1536  Last data filed at 11/22/2020 0900  Gross per 24 hour   Intake 480 ml   Output --   Net 480 ml      Physical Exam  Vitals signs and nursing note reviewed.   Constitutional:       General: She is not in acute distress.     Appearance: She is well-developed. She is not ill-appearing, toxic-appearing or diaphoretic.   HENT:      Head: Normocephalic and atraumatic.   Eyes:      Conjunctiva/sclera: Conjunctivae normal.      Pupils: Pupils are equal, round, and reactive to light.   Neck:      Musculoskeletal: Normal range of motion and neck supple.      Thyroid: No thyromegaly.      Vascular: No JVD.   Cardiovascular:      Rate and Rhythm: Normal rate and regular rhythm.       Heart sounds: Normal heart sounds. No murmur. No friction rub. No gallop.    Pulmonary:      Effort: Pulmonary effort is normal.      Breath sounds: No wheezing or rales.      Comments: 2LNC  Abdominal:      General: Bowel sounds are normal. There is no distension.      Palpations: Abdomen is soft.      Tenderness: There is no abdominal tenderness.   Genitourinary:     Comments: No limon  Musculoskeletal: Normal range of motion.   Skin:     General: Skin is warm and dry.   Neurological:      General: No focal deficit present.      Mental Status: She is alert and oriented to person, place, and time.      Deep Tendon Reflexes: Reflexes are normal and symmetric.   Psychiatric:         Mood and Affect: Mood normal.         Behavior: Behavior normal.       Significant Labs:   CBC:   Recent Labs   Lab 11/21/20  0516 11/22/20  0403   WBC 4.98 4.90   HGB 12.3 11.9*   HCT 37.1 36.1*    284     CMP:   Recent Labs   Lab 11/21/20 0516 11/21/20 2122 11/22/20  0403     --  136   K 3.7  --  4.1     --  102   CO2 23  --  23   * 478* 244*   BUN 12  --  20   CREATININE 0.7  --  0.7   CALCIUM 8.6*  --  8.8   PROT 7.6  --   --    ALBUMIN 3.6  --   --    BILITOT 0.6  --   --    ALKPHOS 118  --   --    AST 51*  --   --    ALT 62*  --   --    ANIONGAP 12  --  11   EGFRNONAA >60.0  --  >60.0     Lab Results   Component Value Date    CRP 15.90 (H) 11/22/2020       Lab Results   Component Value Date    FERRITIN 529 (H) 11/22/2020       Lab Results   Component Value Date    DDIMER 0.47 11/22/2020       Recent Labs   Lab 11/22/20  0403   TROPONINI <0.030      All pertinent labs within the past 24 hours have been reviewed.    Significant Imaging:   No new images today    Lab Results   Component Value Date    HGBA1C 9.5 (H) 11/21/2020         Assessment/Plan:      Active Hospital Problems    Diagnosis  POA    *Pneumonia due to COVID-19 virus [U07.1, J12.89]  Yes    SOB (shortness of breath) [R06.02]  Yes     Diabetes mellitus due to underlying condition with hyperglycemia, without long-term current use of insulin [E08.65]  No      Resolved Hospital Problems   No resolved problems to display.     PLAN:  Symptomatic management for COVID-19 pneumonia   Supplemental oxygen to maintain SpO2 >92%  ID consulted for eval for convalescent plasma 2  type and screen ordered  cp ordered  Remdesivir (started 11/21)   Dexamethasone PO 6 mg q24 hr started 11/21  antipyretics, antitussives and inhalers   Replete electrolytes and monitor daily   Check and trend inflammatory markers including ferritin, D-dimer, LDH and CRP   Ddimer:  Corrected for age wnl.  CTA chest negative for PE, lower extremity ultrasound negative for DVT  Strict airborne, droplet and contact isolation precautions   Enoxaparin for VTE prophylaxis  Up to chair and proning encouraged    New dx of DM2 : A1c 9.5. dm education provided. levemir 10 qhs and high dose sliding scale insulin, accuchecks        VTE Risk Mitigation (From admission, onward)         Ordered     enoxaparin injection 40 mg  Every 24 hours      11/21/20 1102     IP VTE HIGH RISK PATIENT  Once      11/21/20 1102     Place sequential compression device  Until discontinued      11/21/20 1102                Discharge Planning   MITZI:      Code Status: Full Code   Is the patient medically ready for discharge?:     Reason for patient still in hospital (select all that apply): Treatment  Discharge Plan A: Home with family                  Rand Isbell DO  Department of Hospital Medicine   [facility]

## 2020-11-22 NOTE — SUBJECTIVE & OBJECTIVE
Interval History:   Afvss, sating 92-95% on 2LNC. ID consulted for cp.   Patient seen and examined.   Today she reports right side flank pain intermittent, moderate, no associated with eating. Hx of cholecystectomy.    +cough, ZAIDI  (-) cp, n/v/d, f/c     Review of Systems   As above  Objective:     Vital Signs (Most Recent):  Temp: 98.1 °F (36.7 °C) (11/22/20 1100)  Pulse: 80 (11/22/20 1338)  Resp: 18 (11/22/20 1410)  BP: 118/71 (11/22/20 1100)  SpO2: (!) 94 % (11/22/20 1338) Vital Signs (24h Range):  Temp:  [97.9 °F (36.6 °C)-98.8 °F (37.1 °C)] 98.1 °F (36.7 °C)  Pulse:  [74-88] 80  Resp:  [16-20] 18  SpO2:  [92 %-95 %] 94 %  BP: (107-147)/(60-85) 118/71     Weight: 93.6 kg (206 lb 4.8 oz)  Body mass index is 29.6 kg/m².    Intake/Output Summary (Last 24 hours) at 11/22/2020 1536  Last data filed at 11/22/2020 0900  Gross per 24 hour   Intake 480 ml   Output --   Net 480 ml      Physical Exam  Vitals signs and nursing note reviewed.   Constitutional:       General: She is not in acute distress.     Appearance: She is well-developed. She is not ill-appearing, toxic-appearing or diaphoretic.   HENT:      Head: Normocephalic and atraumatic.   Eyes:      Conjunctiva/sclera: Conjunctivae normal.      Pupils: Pupils are equal, round, and reactive to light.   Neck:      Musculoskeletal: Normal range of motion and neck supple.      Thyroid: No thyromegaly.      Vascular: No JVD.   Cardiovascular:      Rate and Rhythm: Normal rate and regular rhythm.      Heart sounds: Normal heart sounds. No murmur. No friction rub. No gallop.    Pulmonary:      Effort: Pulmonary effort is normal.      Breath sounds: No wheezing or rales.      Comments: 2LNC  Abdominal:      General: Bowel sounds are normal. There is no distension.      Palpations: Abdomen is soft.      Tenderness: There is no abdominal tenderness.   Genitourinary:     Comments: No limon  Musculoskeletal: Normal range of motion.   Skin:     General: Skin is warm and dry.    Neurological:      General: No focal deficit present.      Mental Status: She is alert and oriented to person, place, and time.      Deep Tendon Reflexes: Reflexes are normal and symmetric.   Psychiatric:         Mood and Affect: Mood normal.         Behavior: Behavior normal.       Significant Labs:   CBC:   Recent Labs   Lab 11/21/20 0516 11/22/20 0403   WBC 4.98 4.90   HGB 12.3 11.9*   HCT 37.1 36.1*    284     CMP:   Recent Labs   Lab 11/21/20 0516 11/21/20 2122 11/22/20 0403     --  136   K 3.7  --  4.1     --  102   CO2 23  --  23   * 478* 244*   BUN 12  --  20   CREATININE 0.7  --  0.7   CALCIUM 8.6*  --  8.8   PROT 7.6  --   --    ALBUMIN 3.6  --   --    BILITOT 0.6  --   --    ALKPHOS 118  --   --    AST 51*  --   --    ALT 62*  --   --    ANIONGAP 12  --  11   EGFRNONAA >60.0  --  >60.0     Lab Results   Component Value Date    CRP 15.90 (H) 11/22/2020       Lab Results   Component Value Date    FERRITIN 529 (H) 11/22/2020       Lab Results   Component Value Date    DDIMER 0.47 11/22/2020       Recent Labs   Lab 11/22/20 0403   TROPONINI <0.030      All pertinent labs within the past 24 hours have been reviewed.    Significant Imaging:   No new images today

## 2020-11-22 NOTE — CONSULTS
Consult Note  Infectious Disease    Reason for Consult:  COVID pneumonia,?  Convalescent plasma    HPI: Savanna West is a  60 y.o. female Who presented to the emergency room yesterday early in the morning complaining of increased shortness of breath.  She tested positive for COVID-19 on .  Throughout this past week she has had fevers, chills, sweats, anorexia, headaches, some dizziness, dyspnea on exertion and was feeling better but became more short of breath with chest heaviness prompting her presentation to the emergency room.  In the emergency room she she had an O2 saturation of 93% on room air was evaluated the CTA of the chest which did not reveal any pulmonary emboli (history of DVT greater than 20 years ago while on OCPs, not on anticoagulation) but did show some patchy mostly left lower lobe infiltrates.  Ultrasound of the legs was negative as well.  She was admitted to the Hospital Medicine service and started on remdesivir, steroids and supplemental oxygen as well as prophylactic Lovenox.    COVID 19:  , repeated   Procalcitonin:   Troponin:  Negative x2  BNP :  D-dimer:  0.60, 0.47  LDH:  Triglycerides:  Ferritin:  534, 529  azithromycin :   Remdesivir :    Convalescent plasma:    Blood type:  A positive  IL-6:  Tocilizumab:  Recent Labs   Lab 20  1422 20  0403   CRP 16.85* 15.90*         Review of patient's allergies indicates:   Allergen Reactions    Codeine Nausea And Vomiting     Past Medical History:   Diagnosis Date    DVT (deep venous thrombosis)      Past Surgical History:   Procedure Laterality Date    BREAST BIOPSY Right 1998     SECTION      GALLBLADDER SURGERY  2011    KIDNEY STONE SURGERY  2006    ND DILATION/CURETTAGE,DIAGNOSTIC      D & C times 2    SHOULDER SURGERY Right 2010    Reconstruction Dr. Madrigal     Social History     Socioeconomic History    Marital status:      Spouse name: Not on file    Number of  children: Not on file    Years of education: Not on file    Highest education level: Not on file   Occupational History    Not on file   Social Needs    Financial resource strain: Not on file    Food insecurity     Worry: Not on file     Inability: Not on file    Transportation needs     Medical: Not on file     Non-medical: Not on file   Tobacco Use    Smoking status: Never Smoker    Smokeless tobacco: Never Used   Substance and Sexual Activity    Alcohol use: No    Drug use: No    Sexual activity: Not on file   Lifestyle    Physical activity     Days per week: Not on file     Minutes per session: Not on file    Stress: Not on file   Relationships    Social connections     Talks on phone: Not on file     Gets together: Not on file     Attends Jewish service: Not on file     Active member of club or organization: Not on file     Attends meetings of clubs or organizations: Not on file     Relationship status: Not on file   Other Topics Concern    Not on file   Social History Narrative    Not on file     History reviewed. No pertinent family history.    Pertinent medications noted:     Review of Systems:    chills, fever, sweats, and had a biphasic illness  No change in vision, loss of vision or diplopia  No sinus congestion, purulent nasal discharge, post nasal drip or facial pain  No pain in mouth or throat. No problems with teeth, gums.  No angina, palpitations, syncope  Continued cough, infrequent sputum production, with shortness of breath after prolonged coughing,, no dyspnea on exertion,  hemoptysis.  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 Alert and attentive, cooperative, comfortable  Eyes:  Anicteric, PERRL, EOMI  ENT:  No ulcers, exudates, thrush, nares patent, dentition is excellent  Neck:  supple, no masses or adenopathy appreciated  Lungs: Fine basilar crackles bilaterally, ability to take a deep breath is impaired by her fear of coughing and chest discomfort.  I do not hear any pleural rubs in the area of maximal chest discomfort   Heart:  RRR, no gallop/murmur/rub noted  Abd:  Soft, NT, ND, normal BS, no masses or organomegaly appreciated.  :  Voids  no flank tenderness  Musc:  Joints without effusion, swelling, erythema, synovitis, muscle wasting.   Skin:  No rashes. No palmar or plantar lesions. No subungual petechiae  Wound:   Neuro:  Alert, attentive, speech fluent, face symmetric, moves all extremities, no focal weakness. Ambulatory  Psych:  Calm, cooperative  Lymphatic:     No cervical, supraclavicular,  nodes  Extrem: No edema, erythema, phlebitis, cellulitis, warm and well perfused  VAD:  Peripheral    Isolation:  COVID    Lines/Tubes/Drains:    General Labs reviewed:  Recent Labs   Lab 11/21/20  0516 11/22/20  0403   WBC 4.98 4.90   HGB 12.3 11.9*   HCT 37.1 36.1*    284       Recent Labs   Lab 11/21/20  0516 11/22/20  0403    136   K 3.7 4.1    102   CO2 23 23   BUN  12 20   CREATININE 0.7 0.7   CALCIUM 8.6* 8.8   PROT 7.6  --    BILITOT 0.6  --    ALKPHOS 118  --    ALT 62*  --    AST 51*  --            Micro:  Microbiology Results (last 7 days)     Procedure Component Value Units Date/Time    Blood culture #1 **CANNOT BE ORDERED STAT** [[id number]] Collected: 11/21/20 0846    Order Status: Completed Specimen: Blood from Peripheral, Antecubital, Left Updated: 11/22/20 1032     Blood Culture, Routine No Growth to date      No Growth to date    Blood culture #2 **CANNOT BE ORDERED STAT** [[id number]] Collected: 11/21/20 0915    Order Status: Completed Specimen: Blood from Peripheral, Hand, Left Updated: 11/22/20 1032     Blood Culture, Routine No Growth to date      No Growth to date        Imaging Reviewed:   CXR   CTA  Lungs  1. No evidence of acute cardiopulmonary embolism.  2. Diffuse alveolar opacities primarily at the lower lobes bilaterally  left side appears to be somewhat more involved suggestive of active  Covid infection or may reflect underlying active pneumonia.  3. Numerous nodes within several of the mediastinal stations that are  likely reactive.    Cardiology:    IMPRESSION & PLAN   1. COVID pneumonia   Hypoxemia   Pleuritic/musculoskeletal plain, negative CTA    2. Hyperglycemia, new diagnosis of diabetes, overweight, history of DVT while on contraceptives greater than 20 years ago      Recommendations:  Continue remdesivir  COVID Convalescent plasma has been discussed and she has given consent  Decadron with tight blood sugar control, Diabetes Education  Albuterol, antitussives, incentive spirometry  Up in chair and prone in bed, walk in room  Trend CBC, CMP, CRP, ferritin, D-dimer  Prophylactic Lovenox with low threshold for increasing the dose  Nonsteroidals p.r.n., may require stronger pain relievers    The FDAs Convalescent Plasma Emergency Use Authorization Fact Sheet was provided to the patient and/or caregiver.  The patient and/or caregiver has been  counseled that the FDA has authorized the emergency use of convalescent plasma which is not FDA approved. The significant known and potential risks and benefits of COVID-19 convalescent plasma and the extent to which such risks and benefits are unknown have been discussed with the patient and/or caregiver. The patient and/or caregiver has been given the option to accept or refuse and has verbally agreed to receive convalescent plasma. All questions and concerns were addressed.      Medical Decision Making during this encounter was  [_] Low Complexity  [_] Moderate Complexity  [ x ] High Complexity

## 2020-11-23 LAB
ALBUMIN SERPL BCP-MCNC: 3.2 G/DL (ref 3.5–5.2)
ALP SERPL-CCNC: 104 U/L (ref 55–135)
ALT SERPL W/O P-5'-P-CCNC: 53 U/L (ref 10–44)
ANION GAP SERPL CALC-SCNC: 11 MMOL/L (ref 8–16)
AST SERPL-CCNC: 34 U/L (ref 10–40)
BASOPHILS # BLD AUTO: 0.01 K/UL (ref 0–0.2)
BASOPHILS NFR BLD: 0.1 % (ref 0–1.9)
BILIRUB SERPL-MCNC: 0.3 MG/DL (ref 0.1–1)
BUN SERPL-MCNC: 22 MG/DL (ref 6–20)
CALCIUM SERPL-MCNC: 8.7 MG/DL (ref 8.7–10.5)
CHLORIDE SERPL-SCNC: 103 MMOL/L (ref 95–110)
CO2 SERPL-SCNC: 24 MMOL/L (ref 23–29)
CREAT SERPL-MCNC: 0.6 MG/DL (ref 0.5–1.4)
CRP SERPL-MCNC: 6.68 MG/DL
D DIMER PPP IA.FEU-MCNC: 0.46 UG/ML FEU
DIFFERENTIAL METHOD: ABNORMAL
EOSINOPHIL # BLD AUTO: 0 K/UL (ref 0–0.5)
EOSINOPHIL NFR BLD: 0 % (ref 0–8)
ERYTHROCYTE [DISTWIDTH] IN BLOOD BY AUTOMATED COUNT: 11.7 % (ref 11.5–14.5)
ERYTHROCYTE [SEDIMENTATION RATE] IN BLOOD BY WESTERGREN METHOD: 67 MM/HR (ref 0–20)
EST. GFR  (AFRICAN AMERICAN): >60 ML/MIN/1.73 M^2
EST. GFR  (NON AFRICAN AMERICAN): >60 ML/MIN/1.73 M^2
FERRITIN SERPL-MCNC: 538 NG/ML (ref 20–300)
GLUCOSE SERPL-MCNC: 270 MG/DL (ref 70–110)
GLUCOSE SERPL-MCNC: 287 MG/DL (ref 70–110)
GLUCOSE SERPL-MCNC: 322 MG/DL (ref 70–110)
GLUCOSE SERPL-MCNC: 395 MG/DL (ref 70–110)
HCT VFR BLD AUTO: 32.9 % (ref 37–48.5)
HGB BLD-MCNC: 10.8 G/DL (ref 12–16)
IMM GRANULOCYTES # BLD AUTO: 0.1 K/UL (ref 0–0.04)
IMM GRANULOCYTES NFR BLD AUTO: 1.4 % (ref 0–0.5)
LYMPHOCYTES # BLD AUTO: 1.2 K/UL (ref 1–4.8)
LYMPHOCYTES NFR BLD: 16.7 % (ref 18–48)
MCH RBC QN AUTO: 29.9 PG (ref 27–31)
MCHC RBC AUTO-ENTMCNC: 32.8 G/DL (ref 32–36)
MCV RBC AUTO: 91 FL (ref 82–98)
MONOCYTES # BLD AUTO: 0.5 K/UL (ref 0.3–1)
MONOCYTES NFR BLD: 7.1 % (ref 4–15)
NEUTROPHILS # BLD AUTO: 5.5 K/UL (ref 1.8–7.7)
NEUTROPHILS NFR BLD: 74.7 % (ref 38–73)
NRBC BLD-RTO: 0 /100 WBC
PLATELET # BLD AUTO: 297 K/UL (ref 150–350)
PMV BLD AUTO: 10.6 FL (ref 9.2–12.9)
POTASSIUM SERPL-SCNC: 4 MMOL/L (ref 3.5–5.1)
PROT SERPL-MCNC: 6.6 G/DL (ref 6–8.4)
RBC # BLD AUTO: 3.61 M/UL (ref 4–5.4)
SODIUM SERPL-SCNC: 138 MMOL/L (ref 136–145)
TROPONIN I SERPL DL<=0.01 NG/ML-MCNC: <0.03 NG/ML
WBC # BLD AUTO: 7.3 K/UL (ref 3.9–12.7)

## 2020-11-23 PROCEDURE — 63600175 PHARM REV CODE 636 W HCPCS: Performed by: INTERNAL MEDICINE

## 2020-11-23 PROCEDURE — 99232 PR SUBSEQUENT HOSPITAL CARE,LEVL II: ICD-10-PCS | Mod: ,,, | Performed by: INTERNAL MEDICINE

## 2020-11-23 PROCEDURE — 25000242 PHARM REV CODE 250 ALT 637 W/ HCPCS: Performed by: STUDENT IN AN ORGANIZED HEALTH CARE EDUCATION/TRAINING PROGRAM

## 2020-11-23 PROCEDURE — 85025 COMPLETE CBC W/AUTO DIFF WBC: CPT

## 2020-11-23 PROCEDURE — 82962 GLUCOSE BLOOD TEST: CPT

## 2020-11-23 PROCEDURE — 99900031 HC PATIENT EDUCATION (STAT)

## 2020-11-23 PROCEDURE — 99900035 HC TECH TIME PER 15 MIN (STAT)

## 2020-11-23 PROCEDURE — 25000003 PHARM REV CODE 250: Performed by: STUDENT IN AN ORGANIZED HEALTH CARE EDUCATION/TRAINING PROGRAM

## 2020-11-23 PROCEDURE — 25000003 PHARM REV CODE 250: Performed by: INTERNAL MEDICINE

## 2020-11-23 PROCEDURE — 27000221 HC OXYGEN, UP TO 24 HOURS

## 2020-11-23 PROCEDURE — 85379 FIBRIN DEGRADATION QUANT: CPT

## 2020-11-23 PROCEDURE — 80053 COMPREHEN METABOLIC PANEL: CPT

## 2020-11-23 PROCEDURE — 94761 N-INVAS EAR/PLS OXIMETRY MLT: CPT

## 2020-11-23 PROCEDURE — 86140 C-REACTIVE PROTEIN: CPT

## 2020-11-23 PROCEDURE — 21400001 HC TELEMETRY ROOM

## 2020-11-23 PROCEDURE — 63600175 PHARM REV CODE 636 W HCPCS: Performed by: STUDENT IN AN ORGANIZED HEALTH CARE EDUCATION/TRAINING PROGRAM

## 2020-11-23 PROCEDURE — 99232 SBSQ HOSP IP/OBS MODERATE 35: CPT | Mod: ,,, | Performed by: INTERNAL MEDICINE

## 2020-11-23 PROCEDURE — 36415 COLL VENOUS BLD VENIPUNCTURE: CPT

## 2020-11-23 PROCEDURE — 84484 ASSAY OF TROPONIN QUANT: CPT

## 2020-11-23 PROCEDURE — 94640 AIRWAY INHALATION TREATMENT: CPT

## 2020-11-23 PROCEDURE — 85651 RBC SED RATE NONAUTOMATED: CPT

## 2020-11-23 PROCEDURE — 82728 ASSAY OF FERRITIN: CPT

## 2020-11-23 RX ORDER — POLYETHYLENE GLYCOL 3350 17 G/17G
17 POWDER, FOR SOLUTION ORAL 2 TIMES DAILY PRN
Status: DISCONTINUED | OUTPATIENT
Start: 2020-11-23 | End: 2020-11-25 | Stop reason: HOSPADM

## 2020-11-23 RX ADMIN — ENOXAPARIN SODIUM 40 MG: 40 INJECTION SUBCUTANEOUS at 04:11

## 2020-11-23 RX ADMIN — ASPIRIN 81 MG: 81 TABLET, DELAYED RELEASE ORAL at 09:11

## 2020-11-23 RX ADMIN — HUMAN INSULIN 12 UNITS: 100 INJECTION, SOLUTION SUBCUTANEOUS at 08:11

## 2020-11-23 RX ADMIN — GUAIFENESIN 600 MG: 600 TABLET, EXTENDED RELEASE ORAL at 08:11

## 2020-11-23 RX ADMIN — DEXAMETHASONE 6 MG: 2 TABLET ORAL at 09:11

## 2020-11-23 RX ADMIN — ALBUTEROL SULFATE 2 PUFF: 90 AEROSOL, METERED RESPIRATORY (INHALATION) at 02:11

## 2020-11-23 RX ADMIN — BENZONATATE 200 MG: 100 CAPSULE ORAL at 08:11

## 2020-11-23 RX ADMIN — BENZONATATE 200 MG: 100 CAPSULE ORAL at 04:11

## 2020-11-23 RX ADMIN — HUMAN INSULIN 9 UNITS: 100 INJECTION, SOLUTION SUBCUTANEOUS at 07:11

## 2020-11-23 RX ADMIN — TRAMADOL HYDROCHLORIDE 50 MG: 50 TABLET, FILM COATED ORAL at 03:11

## 2020-11-23 RX ADMIN — HUMAN INSULIN 15 UNITS: 100 INJECTION, SOLUTION SUBCUTANEOUS at 04:11

## 2020-11-23 RX ADMIN — HUMAN INSULIN 9 UNITS: 100 INJECTION, SOLUTION SUBCUTANEOUS at 12:11

## 2020-11-23 RX ADMIN — REMDESIVIR 100 MG: 100 INJECTION, POWDER, LYOPHILIZED, FOR SOLUTION INTRAVENOUS at 12:11

## 2020-11-23 RX ADMIN — INSULIN DETEMIR 10 UNITS: 100 INJECTION, SOLUTION SUBCUTANEOUS at 08:11

## 2020-11-23 RX ADMIN — BENZONATATE 200 MG: 100 CAPSULE ORAL at 09:11

## 2020-11-23 RX ADMIN — ALBUTEROL SULFATE 2 PUFF: 90 AEROSOL, METERED RESPIRATORY (INHALATION) at 07:11

## 2020-11-23 RX ADMIN — ALBUTEROL SULFATE 2 PUFF: 90 AEROSOL, METERED RESPIRATORY (INHALATION) at 08:11

## 2020-11-23 RX ADMIN — GUAIFENESIN 600 MG: 600 TABLET, EXTENDED RELEASE ORAL at 09:11

## 2020-11-23 NOTE — PLAN OF CARE
11/22/20 2059   Patient Assessment/Suction   Level of Consciousness (AVPU) alert   Respiratory Effort Unlabored   Expansion/Accessory Muscles/Retractions no use of accessory muscles   All Lung Fields Breath Sounds diminished   Rhythm/Pattern, Respiratory unlabored   Cough Frequency no cough   PRE-TX-O2   O2 Device (Oxygen Therapy) nasal cannula   Flow (L/min) 2   SpO2 (!) 93 %   Pulse 72   Resp 18   Inhaler   $ Inhaler Charges MDI (Metered Dose Inahler) Treatment   Daily Review of Necessity (Inhaler) completed   Respiratory Treatment Status (Inhaler) given   Treatment Route (Inhaler) spacer/holding chamber   Patient Position (Inhaler) HOB elevated   Post Treatment Assessment (Inhaler) breath sounds unchanged   Signs of Intolerance (Inhaler) none   Breath Sounds Post-Respiratory Treatment   Throughout All Fields Post-Treatment All Fields   Throughout All Fields Post-Treatment clear   Post-treatment Heart Rate (beats/min) 73   Post-treatment Resp Rate (breaths/min) 73   Respiratory Evaluation   $ Care Plan Tech Time 15 min   Evaluation For   (CARE PLAN)

## 2020-11-23 NOTE — CONSULTS
[facility]  Adult Nutrition   Consult Note (Initial Assessment)     SUMMARY     Recommendations  Recommendation/Intervention:   1. RD provided RN with folder containing diabetic diet and lifestyle skills packet and RD contact information to be provided to patient.   2. Continue 1800 calorie diabetic diet and encourage adequate PO intake.   3. Hyperglycemia, 24 hour BG range = 287 - 395 mg/dL. Newly diagnosed DM and on steroids. Patient on High dose SSI and 10 units detemir daily. If current insulin regime is insufficient to control blood glucose, recommend increasing long acting insulin dose or starting insulin drip until glycemic control can be obtained.  Goals:  1. Patient to receive diabetic diet educational materials and RD to answer patient's questions regarding diet should they arise.  2. Patient to meet at least 75% of estimated energy and protein needs.   3. Blood glucose to trend towards target range.  Nutrition Goal Status: new  Communication of RD Recs: reviewed with RN    Dietitian Rounds Brief  · Patient assessed 2' consult, newly diagnosed DM. Patient in isolation due to COVID-19 positive. RD unable to visit patient. Decreased appetite and nausea upon admission per progress notes.  PO intake good per flowsheet documentation.   · RD printed full diabetic diet education packet and created folder with educational materials (definition of diagnosis, blood glucose target range, HA1c, meal planning and carbohydrate counting, food label reading, sample meal plan, diabetes foot care, signs and symptoms of hypoglycemia and hyperglycemia, diabetes, stress management and depression), inpatient clinical dietitian contact information and outpatient dietitian contact information and diabetes class flyer. Provided RN with folder to give to patient with instructions for patient to review educational materials and contact RD should questions arise. RN will provide patient with nutrition education  "materials next time she goes into room.      Reason for Assessment  Reason For Assessment: consult  Diagnosis: infection/sepsis  Relevant Medical History: Pneumonia due to COVID-19 virus; SOB; Diabetes mellitus due to underlying condition with hyperglycemia, without long-term current use of insulin  Nutrition Discharge Planning: Diabetic Diet    Nutrition Risk Screen  Nutrition Risk Screen: no indicators present     MST Score: 1  Have you recently lost weight without trying?: No  Weight loss score: 0  Have you been eating poorly because of a decreased appetite?: Yes  Appetite score: 1       Nutrition/Diet History  Food Allergies: NKFA  Factors Affecting Nutritional Intake: decreased appetite    Anthropometrics  Temp: 96.2 °F (35.7 °C)  Height Method: Stated  Height: 5' 10" (177.8 cm)  Height (inches): 70 in  Weight Method: Standard Scale  Weight: 93.6 kg (206 lb 4.8 oz)  Weight (lb): 206.3 lb  Ideal Body Weight (IBW), Female: 150 lb  % Ideal Body Weight, Female (lb): 137.53 %  BMI (Calculated): 29.6  BMI Grade: 25 - 29.9 - overweight       Weight History:  Wt Readings from Last 10 Encounters:   11/21/20 93.6 kg (206 lb 4.8 oz)   10/10/18 90.3 kg (199 lb)   08/08/18 90.3 kg (199 lb)   07/13/18 90.3 kg (199 lb)       Lab/Procedures/Meds: Pertinent Labs Reviewed    Clinical Chemistry:  Recent Labs   Lab 11/22/20  0403 11/23/20  0436    138   K 4.1 4.0    103   CO2 23 24   * 270*   BUN 20 22*   CREATININE 0.7 0.6   CALCIUM 8.8 8.7   PROT  --  6.6   ALBUMIN  --  3.2*   BILITOT  --  0.3   ALKPHOS  --  104   AST  --  34   ALT  --  53*   ANIONGAP 11 11   ESTGFRAFRICA >60.0 >60.0   EGFRNONAA >60.0 >60.0   MG 2.0  --    PHOS 2.8  --        CBC:   Recent Labs   Lab 11/23/20  0437   WBC 7.30   RBC 3.61*   HGB 10.8*   HCT 32.9*      MCV 91   MCH 29.9   MCHC 32.8       Cardiac Profile:  Recent Labs   Lab 11/21/20  0516 11/22/20  0403 11/23/20  0436   BNP 74  --   --    TROPONINI <0.030 <0.030 <0.030 "       Inflammatory Labs:  Recent Labs   Lab 11/21/20  1422 11/22/20  0403 11/23/20  0436   CRP 16.85* 15.90* 6.68*       Diabetes:  Recent Labs   Lab 11/21/20  1423   HGBA1C 9.5*        Ref. Range 11/22/2020 11:00 11/22/2020 15:50 11/22/2020 20:09 11/23/2020 12:01 11/23/2020 16:04   POC Glucose Latest Ref Range: 70 - 110  301 (H) 374 (H) 393 (H) 287 (H) 395 (H)       Medications: Pertinent Medications reviewed    Scheduled Meds:   albuterol  2 puff Inhalation Q6H WAKE    aspirin  81 mg Oral Daily    benzonatate  200 mg Oral TID    dexAMETHasone  6 mg Oral Daily    enoxaparin  40 mg Subcutaneous Q24H    guaiFENesin  600 mg Oral BID    insulin detemir U-100  10 Units Subcutaneous QHS    EUA remdesivir infusion  100 mg Intravenous Q24H       Continuous Infusions:    PRN Meds:.sodium chloride, acetaminophen, acetaminophen, albuterol **AND** MDI Q4H PRN, dextrose 50%, dextrose 50%, dextrose 50%, dextrose 50%, diphenhydrAMINE, glucagon (human recombinant), glucose, glucose, hydrocodone-chlorpheniramine, insulin regular, magnesium oxide, magnesium oxide, melatonin, naproxen, ondansetron, polyethylene glycol, potassium chloride, potassium chloride, potassium, sodium phosphates, potassium, sodium phosphates, potassium, sodium phosphates, promethazine (PHENERGAN) IVPB, sodium chloride 0.9%, traMADoL    Estimated/Assessed Needs  Weight Used For Calorie Calculations: 93.6 kg (206 lb 5.6 oz)  Energy Calorie Requirements (kcal): 2340 (25 kcal/kg)  Energy Need Method: Kcal/kg  Protein Requirements: 75 - 94 (0.8 - 1.0)  Weight Used For Protein Calculations: 93.6 kg (206 lb 5.6 oz)     Estimated Fluid Requirement Method: RDA Method  RDA Method (mL): 2340       Nutrition Prescription Ordered  Current Diet Order: 1800 calorie Diabetic Diet    Evaluation of Received Nutrient/Fluid Intake  Energy Calories Required: meeting needs  Protein Required: meeting needs  Fluid Required: meeting needs  Tolerance: tolerating      Intake/Output Summary (Last 24 hours) at 11/23/2020 1758  Last data filed at 11/23/2020 1300  Gross per 24 hour   Intake 902.6 ml   Output --   Net 902.6 ml      % Intake of Estimated Energy Needs: 75 - 100 %  % Meal Intake: 75 - 100 %    Nutrition Risk  Level of Risk/Frequency of Follow-up: moderate     Monitor and Evaluation  Food and Nutrient Intake: energy intake, food and beverage intake  Food and Nutrient Adminstration: diet order  Physical Activity and Function: nutrition-related ADLs and IADLs, factors affecting access to physical activity  Anthropometric Measurements: weight, weight change, body mass index  Biochemical Data, Medical Tests and Procedures: electrolyte and renal panel, inflammatory profile, gastrointestinal profile, lipid profile, glucose/endocrine profile  Nutrition-Focused Physical Findings: overall appearance     Nutrition Follow-Up  RD Follow-up?: Yes    Bridget Patel RD 11/23/2020 5:59 PM

## 2020-11-23 NOTE — PROGRESS NOTES
[facility]  Progress Note    Patient Name: Savanna West  MRN: [id number]  Patient Class: IP- Inpatient   Admission Date: 2020  Length of Stay: 2 days  Attending Physician: Rand Isbell DO  Primary Care Provider: Primary Doctor No        Subjective:     Principal Problem:Pneumonia due to COVID-19 virus  Chief Complaint   Patient presents with    Shortness of Breath     COVID + dx . States feels breathing is worse.        Interval history:  AF VSS, sating well on 2LNC, tolerating diet without n/v, ambulating around room.   S/p 2 units cp yesterday.   Denies any CP, N/V/D, headaches, fever or chills.   + constipation, cough, sob worse with activity, right side flank/chest wall pain worse with movement or cough.       Scheduled Meds:   albuterol  2 puff Inhalation Q6H WAKE    aspirin  81 mg Oral Daily    benzonatate  200 mg Oral TID    dexAMETHasone  6 mg Oral Daily    enoxaparin  40 mg Subcutaneous Q24H    guaiFENesin  600 mg Oral BID    insulin detemir U-100  10 Units Subcutaneous QHS    EUA remdesivir infusion  100 mg Intravenous Q24H     Continuous Infusions:  PRN Meds:sodium chloride, acetaminophen, acetaminophen, albuterol **AND** MDI Q4H PRN, dextrose 50%, dextrose 50%, dextrose 50%, dextrose 50%, diphenhydrAMINE, glucagon (human recombinant), glucose, glucose, hydrocodone-chlorpheniramine, insulin regular, magnesium oxide, magnesium oxide, melatonin, naproxen, ondansetron, potassium chloride, potassium chloride, potassium, sodium phosphates, potassium, sodium phosphates, potassium, sodium phosphates, promethazine (PHENERGAN) IVPB, sodium chloride 0.9%, traMADoL    Review of patient's allergies indicates:   Allergen Reactions    Codeine Nausea And Vomiting     Objectives:     Vitals(Most Recent)      BP  Min: 118/69  Max: 150/72  Temp  Av.1 °F (36.2 °C)  Min: 95.7 °F (35.4 °C)  Max: 98.6 °F (37 °C)  Pulse  Av.2  Min: 72  Max: 87  Resp  Avg:  17.8  Min: 16  Max: 20  SpO2  Av.9 %  Min: 91 %  Max: 96 %             Vitals([id number])  Temp:  [95.7 °F (35.4 °C)-98.6 °F (37 °C)]   Pulse:  [72-87]   Resp:  [16-20]   BP: (118-150)/(65-75)   SpO2:  [91 %-96 %]     I & O([id number])    Intake/Output Summary (Last 24 hours) at 2020 1420  Last data filed at 2020 0930  Gross per 24 hour   Intake 652.6 ml   Output --   Net 652.6 ml       Physical Exam:   General: AAOx3. NAD.  HEENT: NCAT. PERRLA.     CV: RRR.   Chest: NL effort. 2LNC, speaking in full sentences  Abd: +BS x 4. Soft. ND/NT. No rebound or guarding.   Ext: moving well.   Skin: Intact. No rash. No lesions.   Neuro:  No focal deficit.  Psych:  No A/V hallucinations. NL affect. No abnormal behaviors noted    LABS  CBC  Recent Labs   Lab 20   WBC 4.98 4.90 7.30   RBC 4.14 3.93* 3.61*   HGB 12.3 11.9* 10.8*   HCT 37.1 36.1* 32.9*    284 297   MCV 90 92 91   MCH 29.7 30.3 29.9   MCHC 33.2 33.0 32.8       CMP  Recent Labs   Lab 20     --  136 138   K 3.7  --  4.1 4.0   CO2 23  --  23 24     --  102 103   BUN 12  --   22*   CREATININE 0.7  --  0.7 0.6   * 478* 244* 270*     Recent Labs   Lab 20   CALCIUM 8.6* 8.8 8.7   MG  --  2.0  --    PHOS  --  2.8  --      Recent Labs   Lab 11/21/20  0516 11/23/20  0436   PROT 7.6 6.6   ALBUMIN 3.6 3.2*   BILITOT 0.6 0.3   AST 51* 34   ALKPHOS 118 104   ALT 62* 53*     CE  Recent Labs   Lab 20  0516 20  0403 20  0436   TROPONINI <0.030 <0.030 <0.030       Lab Results   Component Value Date    CRP 6.68 (H) 2020       Lab Results   Component Value Date    FERRITIN 538 (H) 2020       Lab Results   Component Value Date    DDIMER 0.46 2020       Lab Results   Component Value Date    HGBA1C 9.5 (H) 2020       Assessment/Plan:      Active Hospital Problems     Diagnosis  POA    *Pneumonia due to COVID-19 virus [U07.1, J12.89]  Yes    Flank pain [R10.9]  Yes    SOB (shortness of breath) [R06.02]  Yes    Diabetes mellitus due to underlying condition with hyperglycemia, without long-term current use of insulin [E08.65]  No      Resolved Hospital Problems   No resolved problems to display.     PLAN:  Symptomatic management for COVID-19 pneumonia   Supplemental oxygen to maintain SpO2 >92%  ID consulted for eval for convalescent plasma 2  type and screen ordered  cp ordered  Remdesivir (started 11/21)   Dexamethasone PO 6 mg q24 hr started 11/21  antipyretics, antitussives and inhalers   Replete electrolytes and monitor daily   Check and trend inflammatory markers including ferritin, D-dimer, LDH and CRP   Ddimer:  Corrected for age wnl.  CTA chest negative for PE, lower extremity ultrasound negative for DVT  Strict airborne, droplet and contact isolation precautions   Enoxaparin for VTE prophylaxis  Up to chair and proning encouraged    New dx of DM2 : A1c 9.5. dm education provided. levemir 10 qhs and high dose sliding scale insulin, accuchecks    Side pain likely msk: supportive care    Constipation: wants to continue with prune juice, prn meds ordered just in case no improvement        VTE Risk Mitigation (From admission, onward)         Ordered     enoxaparin injection 40 mg  Every 24 hours      11/21/20 1102     IP VTE HIGH RISK PATIENT  Once      11/21/20 1102     Place sequential compression device  Until discontinued      11/21/20 1102                Discharge Planning   MITZI:      Code Status: Full Code   Is the patient medically ready for discharge?:     Reason for patient still in hospital (select all that apply): Treatment  Discharge Plan A: Home with family                  Rand Isbell DO  Department of Hospital Medicine   [facility]

## 2020-11-23 NOTE — PROGRESS NOTES
Consult Note  Infectious Disease    Reason for Consult:  COVID pneumonia,?  Convalescent plasma    HPI: Savanna West is a  60 y.o. female Who presented to the emergency room yesterday early in the morning complaining of increased shortness of breath.  She tested positive for COVID-19 on 11/12.  Throughout this past week she has had fevers, chills, sweats, anorexia, headaches, some dizziness, dyspnea on exertion and was feeling better but became more short of breath with chest heaviness prompting her presentation to the emergency room.  In the emergency room she she had an O2 saturation of 93% on room air was evaluated the CTA of the chest which did not reveal any pulmonary emboli (history of DVT greater than 20 years ago while on OCPs, not on anticoagulation) but did show some patchy mostly left lower lobe infiltrates.  Ultrasound of the legs was negative as well.  She was admitted to the Hospital Medicine service and started on remdesivir, steroids and supplemental oxygen as well as prophylactic Lovenox.    11/23:  Afebrile on steroids, requiring 2 L nasal cannula to achieve 93% saturation.  Received 2 units of Convalescent plasma without incident (?  Mild stomach ache).  CRP is much improved.  She is up and down to the bathroom but have not been out of bed yet today,2:30 p.m. but I did prepare the chair and   she got up to it.  She can pull 1500 cc on incentive spirometry with coaching .  She is eating a bit, drinking well, drinking prune juice and anticipating its effect.    COVID 19:  11/12, repeated 11/21  Procalcitonin:   Troponin:  Negative x3   BNP :  D-dimer:  0.60, 0.47  LDH:  Triglycerides:  Ferritin:  534, 529, 538  azithromycin :   Remdesivir :  11/21  Convalescent plasma:  11/22  Blood type:  A positive  IL-6:  Tocilizumab:  Recent Labs   Lab 11/21/20  1422 11/22/20  0403 11/23/20  0436   CRP 16.85* 15.90* 6.68*         EXAM & DIAGNOSTICS REVIEWED:   Vitals:     Temp:  [95.7 °F (35.4 °C)-98.6 °F (37  °C)]   Temp: 97.9 °F (36.6 °C) (11/23/20 0324)  Pulse: 87 (11/23/20 0754)  Resp: 16 (11/23/20 0754)  BP: 134/75 (11/23/20 0324)  SpO2: (!) 93 % (11/23/20 0754)    Intake/Output Summary (Last 24 hours) at 11/23/2020 0848  Last data filed at 11/22/2020 2321  Gross per 24 hour   Intake 695.6 ml   Output --   Net 695.6 ml       General:  In NAD. Alert and attentive, cooperative, comfortable  Eyes:  Anicteric,   EOMI  ENT:  No ulcers, exudates, thrush, nares patent, dentition is excellent  Neck:  supple,    Lungs: Minimal Fine basilar crackles bilaterally, ability to take a deep breath is improved and her cough is better.    Heart:  RRR, no gallop/murmur/rub noted  Abd:  Soft, NT, ND, normal BS, no masses or organomegaly appreciated.  :  Voids  no flank tenderness (she has some stress urinary incontinence)  Musc:  Joints without effusion, swelling, erythema, synovitis, muscle wasting.   Skin:  No rashes. No palmar or plantar lesions. No subungual petechiae  Wound:   Neuro:  Alert, attentive, speech fluent, face symmetric, moves all extremities, no focal weakness. Ambulatory  Psych:  Calm, cooperative, pleasant  Lymphatic:        Extrem: No edema, erythema, phlebitis, cellulitis, warm and well perfused  VAD:  Peripheral    Isolation:  COVID    Lines/Tubes/Drains:    General Labs reviewed:  Recent Labs   Lab 11/21/20 0516 11/22/20 0403 11/23/20  0437   WBC 4.98 4.90 7.30   HGB 12.3 11.9* 10.8*   HCT 37.1 36.1* 32.9*    284 297       Recent Labs   Lab 11/21/20 0516 11/22/20 0403 11/23/20  0436    136 138   K 3.7 4.1 4.0    102 103   CO2 23 23 24   BUN 12 20 22*   CREATININE 0.7 0.7 0.6   CALCIUM 8.6* 8.8 8.7   PROT 7.6  --  6.6   BILITOT 0.6  --  0.3   ALKPHOS 118  --  104   ALT 62*  --  53*   AST 51*  --  34           Micro:  Microbiology Results (last 7 days)     Procedure Component Value Units Date/Time    Blood culture #1 **CANNOT BE ORDERED STAT** [[id number]] Collected: 11/21/20 0846    Order  Status: Completed Specimen: Blood from Peripheral, Antecubital, Left Updated: 11/22/20 1032     Blood Culture, Routine No Growth to date      No Growth to date    Blood culture #2 **CANNOT BE ORDERED STAT** [[id number]] Collected: 11/21/20 0915    Order Status: Completed Specimen: Blood from Peripheral, Hand, Left Updated: 11/22/20 1032     Blood Culture, Routine No Growth to date      No Growth to date        Imaging Reviewed:   CXR   CTA  Lungs  1. No evidence of acute cardiopulmonary embolism.  2. Diffuse alveolar opacities primarily at the lower lobes bilaterally  left side appears to be somewhat more involved suggestive of active  Covid infection or may reflect underlying active pneumonia.  3. Numerous nodes within several of the mediastinal stations that are  likely reactive.    Cardiology:    IMPRESSION & PLAN   1. COVID pneumonia   Hypoxemia   Pleuritic/musculoskeletal plain, negative CTA    2. Hyperglycemia, new diagnosis of diabetes, overweight, history of DVT while on contraceptives greater than 20 years ago      Recommendations:  Continue remdesivir     Decadron with tight blood sugar control, Diabetes Education  Albuterol, antitussives, incentive spirometry  Up in chair and prone in bed, walk in room  Trend CBC, CMP, CRP, ferritin, D-dimer  Prophylactic Lovenox with low threshold for increasing the dose  Nonsteroidals p.r.n., may require stronger pain relievers  ?  Does she need daily troponins still         Medical Decision Making during this encounter was  [_] Low Complexity  [_] Moderate Complexity  [ x ] High Complexity

## 2020-11-23 NOTE — HOSPITAL COURSE
60-year-old  female with past medical history of DVT (not currently on anticoagulation) presented to ED complaining of worsening cough, shortness of breath.  Patient states she was diagnosed with COVID on November 12th.  She states she was managing illness at home with over-the-counter medications including Mucinex, naproxen  however shortness of breath became severe overnight and she presented to emergency department.  Patient reports generalized fatigue, nasal congestion with rhinorrhea, subjective fever, chills, body aches, decreased appetite, headache, change of taste.  She denies chest pain, palpitations.  In the emergency room she she had an O2 saturation of 93% on room air was evaluated the CTA of the chest which did not reveal any pulmonary emboli (history of DVT greater than 20 years ago while on OCPs, not on anticoagulation) but did show some patchy mostly left lower lobe infiltrates.  Ultrasound of the legs was negative as well.       11/22:  started on remdesivir, steroids and supplemental oxygen as well as prophylactic Lovenox, ID consulted.     11/23: 2 L nasal cannula to achieve 93% saturation.  Received 2 units of Convalescent plasma.     11/24:  Afebrile on steroids, O2 saturation 97% on 2 L, inflammatory markers are improved.      11/25:  Patient doing well, has no complaints and states that she is able to breathe well without oxygen.

## 2020-11-24 LAB
ALBUMIN SERPL BCP-MCNC: 3.3 G/DL (ref 3.5–5.2)
ALP SERPL-CCNC: 104 U/L (ref 55–135)
ALT SERPL W/O P-5'-P-CCNC: 53 U/L (ref 10–44)
ANION GAP SERPL CALC-SCNC: 12 MMOL/L (ref 8–16)
AST SERPL-CCNC: 32 U/L (ref 10–40)
BASOPHILS # BLD AUTO: 0.02 K/UL (ref 0–0.2)
BASOPHILS NFR BLD: 0.2 % (ref 0–1.9)
BILIRUB SERPL-MCNC: 0.4 MG/DL (ref 0.1–1)
BUN SERPL-MCNC: 17 MG/DL (ref 6–20)
CALCIUM SERPL-MCNC: 9.1 MG/DL (ref 8.7–10.5)
CHLORIDE SERPL-SCNC: 104 MMOL/L (ref 95–110)
CO2 SERPL-SCNC: 27 MMOL/L (ref 23–29)
CREAT SERPL-MCNC: 0.6 MG/DL (ref 0.5–1.4)
CRP SERPL-MCNC: 4.14 MG/DL
D DIMER PPP IA.FEU-MCNC: 0.58 UG/ML FEU
DIFFERENTIAL METHOD: ABNORMAL
EOSINOPHIL # BLD AUTO: 0 K/UL (ref 0–0.5)
EOSINOPHIL NFR BLD: 0 % (ref 0–8)
ERYTHROCYTE [DISTWIDTH] IN BLOOD BY AUTOMATED COUNT: 11.7 % (ref 11.5–14.5)
EST. GFR  (AFRICAN AMERICAN): >60 ML/MIN/1.73 M^2
EST. GFR  (NON AFRICAN AMERICAN): >60 ML/MIN/1.73 M^2
FERRITIN SERPL-MCNC: 474 NG/ML (ref 20–300)
GLUCOSE SERPL-MCNC: 182 MG/DL (ref 70–110)
GLUCOSE SERPL-MCNC: 184 MG/DL (ref 70–110)
GLUCOSE SERPL-MCNC: 241 MG/DL (ref 70–110)
GLUCOSE SERPL-MCNC: 377 MG/DL (ref 70–110)
GLUCOSE SERPL-MCNC: 427 MG/DL (ref 70–110)
HCT VFR BLD AUTO: 35.5 % (ref 37–48.5)
HGB BLD-MCNC: 11.8 G/DL (ref 12–16)
IMM GRANULOCYTES # BLD AUTO: 0.36 K/UL (ref 0–0.04)
IMM GRANULOCYTES NFR BLD AUTO: 3.9 % (ref 0–0.5)
LYMPHOCYTES # BLD AUTO: 1.7 K/UL (ref 1–4.8)
LYMPHOCYTES NFR BLD: 18.5 % (ref 18–48)
MCH RBC QN AUTO: 30.3 PG (ref 27–31)
MCHC RBC AUTO-ENTMCNC: 33.2 G/DL (ref 32–36)
MCV RBC AUTO: 91 FL (ref 82–98)
MONOCYTES # BLD AUTO: 0.7 K/UL (ref 0.3–1)
MONOCYTES NFR BLD: 7.6 % (ref 4–15)
NEUTROPHILS # BLD AUTO: 6.5 K/UL (ref 1.8–7.7)
NEUTROPHILS NFR BLD: 69.8 % (ref 38–73)
NRBC BLD-RTO: 0 /100 WBC
PLATELET # BLD AUTO: 336 K/UL (ref 150–350)
PMV BLD AUTO: 10 FL (ref 9.2–12.9)
POTASSIUM SERPL-SCNC: 4.1 MMOL/L (ref 3.5–5.1)
PROT SERPL-MCNC: 7 G/DL (ref 6–8.4)
RBC # BLD AUTO: 3.9 M/UL (ref 4–5.4)
SODIUM SERPL-SCNC: 143 MMOL/L (ref 136–145)
TROPONIN I SERPL DL<=0.01 NG/ML-MCNC: <0.03 NG/ML
WBC # BLD AUTO: 9.3 K/UL (ref 3.9–12.7)

## 2020-11-24 PROCEDURE — 25000003 PHARM REV CODE 250: Performed by: STUDENT IN AN ORGANIZED HEALTH CARE EDUCATION/TRAINING PROGRAM

## 2020-11-24 PROCEDURE — 99900031 HC PATIENT EDUCATION (STAT)

## 2020-11-24 PROCEDURE — 25000003 PHARM REV CODE 250: Performed by: INTERNAL MEDICINE

## 2020-11-24 PROCEDURE — 85025 COMPLETE CBC W/AUTO DIFF WBC: CPT

## 2020-11-24 PROCEDURE — 27000221 HC OXYGEN, UP TO 24 HOURS

## 2020-11-24 PROCEDURE — 94640 AIRWAY INHALATION TREATMENT: CPT

## 2020-11-24 PROCEDURE — 80053 COMPREHEN METABOLIC PANEL: CPT

## 2020-11-24 PROCEDURE — 99900035 HC TECH TIME PER 15 MIN (STAT)

## 2020-11-24 PROCEDURE — 36415 COLL VENOUS BLD VENIPUNCTURE: CPT

## 2020-11-24 PROCEDURE — 94761 N-INVAS EAR/PLS OXIMETRY MLT: CPT

## 2020-11-24 PROCEDURE — 99232 SBSQ HOSP IP/OBS MODERATE 35: CPT | Mod: ,,, | Performed by: INTERNAL MEDICINE

## 2020-11-24 PROCEDURE — 25000242 PHARM REV CODE 250 ALT 637 W/ HCPCS: Performed by: STUDENT IN AN ORGANIZED HEALTH CARE EDUCATION/TRAINING PROGRAM

## 2020-11-24 PROCEDURE — 82728 ASSAY OF FERRITIN: CPT

## 2020-11-24 PROCEDURE — 99232 PR SUBSEQUENT HOSPITAL CARE,LEVL II: ICD-10-PCS | Mod: ,,, | Performed by: INTERNAL MEDICINE

## 2020-11-24 PROCEDURE — 85379 FIBRIN DEGRADATION QUANT: CPT

## 2020-11-24 PROCEDURE — 84484 ASSAY OF TROPONIN QUANT: CPT

## 2020-11-24 PROCEDURE — 63600175 PHARM REV CODE 636 W HCPCS: Performed by: STUDENT IN AN ORGANIZED HEALTH CARE EDUCATION/TRAINING PROGRAM

## 2020-11-24 PROCEDURE — 63600175 PHARM REV CODE 636 W HCPCS: Performed by: INTERNAL MEDICINE

## 2020-11-24 PROCEDURE — 21400001 HC TELEMETRY ROOM

## 2020-11-24 PROCEDURE — 86140 C-REACTIVE PROTEIN: CPT

## 2020-11-24 RX ORDER — ZOLPIDEM TARTRATE 5 MG/1
5 TABLET ORAL NIGHTLY PRN
Status: DISCONTINUED | OUTPATIENT
Start: 2020-11-24 | End: 2020-11-25 | Stop reason: HOSPADM

## 2020-11-24 RX ADMIN — ALBUTEROL SULFATE 2 PUFF: 90 AEROSOL, METERED RESPIRATORY (INHALATION) at 03:11

## 2020-11-24 RX ADMIN — DEXAMETHASONE 6 MG: 2 TABLET ORAL at 09:11

## 2020-11-24 RX ADMIN — ALBUTEROL SULFATE 2 PUFF: 90 AEROSOL, METERED RESPIRATORY (INHALATION) at 08:11

## 2020-11-24 RX ADMIN — ALBUTEROL SULFATE 2 PUFF: 90 AEROSOL, METERED RESPIRATORY (INHALATION) at 07:11

## 2020-11-24 RX ADMIN — HUMAN INSULIN 18 UNITS: 100 INJECTION, SOLUTION SUBCUTANEOUS at 08:11

## 2020-11-24 RX ADMIN — ENOXAPARIN SODIUM 40 MG: 40 INJECTION SUBCUTANEOUS at 06:11

## 2020-11-24 RX ADMIN — BENZONATATE 200 MG: 100 CAPSULE ORAL at 09:11

## 2020-11-24 RX ADMIN — HUMAN INSULIN 3 UNITS: 100 INJECTION, SOLUTION SUBCUTANEOUS at 09:11

## 2020-11-24 RX ADMIN — BENZONATATE 200 MG: 100 CAPSULE ORAL at 08:11

## 2020-11-24 RX ADMIN — ACETAMINOPHEN 650 MG: 325 TABLET, FILM COATED ORAL at 03:11

## 2020-11-24 RX ADMIN — PROMETHAZINE HYDROCHLORIDE 6.25 MG: 25 INJECTION INTRAMUSCULAR; INTRAVENOUS at 10:11

## 2020-11-24 RX ADMIN — ASPIRIN 81 MG: 81 TABLET, DELAYED RELEASE ORAL at 09:11

## 2020-11-24 RX ADMIN — REMDESIVIR 100 MG: 100 INJECTION, POWDER, LYOPHILIZED, FOR SOLUTION INTRAVENOUS at 12:11

## 2020-11-24 RX ADMIN — GUAIFENESIN 600 MG: 600 TABLET, EXTENDED RELEASE ORAL at 09:11

## 2020-11-24 RX ADMIN — HUMAN INSULIN 6 UNITS: 100 INJECTION, SOLUTION SUBCUTANEOUS at 12:11

## 2020-11-24 RX ADMIN — PROMETHAZINE HYDROCHLORIDE 6.25 MG: 25 INJECTION INTRAMUSCULAR; INTRAVENOUS at 09:11

## 2020-11-24 RX ADMIN — GUAIFENESIN 600 MG: 600 TABLET, EXTENDED RELEASE ORAL at 08:11

## 2020-11-24 RX ADMIN — BENZONATATE 200 MG: 100 CAPSULE ORAL at 02:11

## 2020-11-24 NOTE — PLAN OF CARE
11/24/20 0746   Patient Assessment/Suction   Level of Consciousness (AVPU) alert   Respiratory Effort Normal;Unlabored   Expansion/Accessory Muscles/Retractions no use of accessory muscles;no retractions   All Lung Fields Breath Sounds diminished   PRE-TX-O2   O2 Device (Oxygen Therapy) nasal cannula   $ Is the patient on Low Flow Oxygen? Yes   Flow (L/min) 2   SpO2 97 %   Pulse Oximetry Type Continuous   $ Pulse Oximetry - Multiple Charge Pulse Oximetry - Multiple   Pulse 77   Resp 18   Inhaler   $ Inhaler Charges MDI (Metered Dose Inahler) Treatment;Given With Spacer   Daily Review of Necessity (Inhaler) completed   Respiratory Treatment Status (Inhaler) given   Treatment Route (Inhaler) spacer/holding chamber   Patient Position (Inhaler) HOB elevated   Post Treatment Assessment (Inhaler) breath sounds unchanged   Signs of Intolerance (Inhaler) none   Education   $ Education Bronchodilator;15 min   Respiratory Evaluation   $ Care Plan Tech Time 15 min

## 2020-11-24 NOTE — PLAN OF CARE
Plan of care discussed with patient. Patient is free of fall/trauma/injury. Denies CP, SOB, or pain/discomfort. Two units of plasma and Remdesivir given on 11/22. Second does of remdesivir given on 11/23.  Pt tolerating well.  2L NC with stats > 96% overnight. All questions addressed. Will continue to monitor.

## 2020-11-24 NOTE — PLAN OF CARE
11/24/20 1616   Discharge Assessment   Assessment Type Discharge Planning Reassessment     CM Consulted for a new PCP for the pt with [facility] Man[employer] Medicare.  Took the list of [facility] and put the number for the [facility] on [facility] at [phone] and to ask for a new PCP. Updated the nurse Mrs arcos since CM limited to pt access with Covid Isolation Precautions, and she will bring to pt on her next visit to room.    Called the pt and updated her to the list to be brought to pt and she appreciated and understood the list coming to her.

## 2020-11-24 NOTE — PROGRESS NOTES
Consult Note  Infectious Disease    Reason for Consult:  COVID pneumonia,?  Convalescent plasma    HPI: Savanna West is a  60 y.o. female Who presented to the emergency room yesterday early in the morning complaining of increased shortness of breath.  She tested positive for COVID-19 on 11/12.  Throughout this past week she has had fevers, chills, sweats, anorexia, headaches, some dizziness, dyspnea on exertion and was feeling better but became more short of breath with chest heaviness prompting her presentation to the emergency room.  In the emergency room she she had an O2 saturation of 93% on room air was evaluated the CTA of the chest which did not reveal any pulmonary emboli (history of DVT greater than 20 years ago while on OCPs, not on anticoagulation) but did show some patchy mostly left lower lobe infiltrates.  Ultrasound of the legs was negative as well.  She was admitted to the Hospital Medicine service and started on remdesivir, steroids and supplemental oxygen as well as prophylactic Lovenox.    11/23:  Afebrile on steroids, requiring 2 L nasal cannula to achieve 93% saturation.  Received 2 units of Convalescent plasma without incident (?  Mild stomach ache).  CRP is much improved.  She is up and down to the bathroom but have not been out of bed yet today,2:30 p.m. but I did prepare the chair and   she got up to it.  She can pull 1500 cc on incentive spirometry with coaching .  She is eating a bit, drinking well, drinking prune juice and anticipating its effect.  11/24:  Afebrile on steroids, O2 saturation 97% on 2 L, inflammatory markers are improved.  Blood sugars are still quite high. Emotionally exhausted from no sleep, new diagnosis of diabetes, fear of DVT, prolonged illness. Did have relief of right flank pain after 3 BMs. Still wants the prune juice.     COVID 19:  11/12, repeated 11/21  Procalcitonin:   Troponin:  Negative x3   BNP :  D-dimer:  0.60, 0.47,  0.58  LDH:  Triglycerides:  Ferritin:  534, 529, 538, 474  azithromycin :   Remdesivir :  11/21  Convalescent plasma:  11/22  Blood type:  A positive  IL-6:  Tocilizumab:  Recent Labs   Lab 11/22/20 0403 11/23/20 0436 11/24/20  0354   CRP 15.90* 6.68* 4.14*         EXAM & DIAGNOSTICS REVIEWED:   Vitals:     Temp:  [96.2 °F (35.7 °C)-97.7 °F (36.5 °C)]   Temp: 96.4 °F (35.8 °C) (11/24/20 0734)  Pulse: 77 (11/24/20 0746)  Resp: 18 (11/24/20 0746)  BP: (!) 176/86 (11/24/20 0734)  SpO2: 97 % (11/24/20 0746)    Intake/Output Summary (Last 24 hours) at 11/24/2020 1028  Last data filed at 11/23/2020 1300  Gross per 24 hour   Intake 250 ml   Output --   Net 250 ml       General:  In NAD. Alert and attentive, cooperative, comfortable  Eyes:  Anicteric,   EOMI  ENT:  No ulcers, exudates, thrush, nares patent, dentition is excellent  Neck:  supple,    Lungs: Minimal Fine basilar crackles bilaterally, ability to take a deep breath is improved    Heart:  RRR, no gallop/murmur/rub noted  Abd:  Soft, NT, ND, normal BS, no masses or organomegaly appreciated.  :  Voids  no flank tenderness   Musc:  Joints without effusion, swelling, erythema, synovitis, muscle wasting.   Skin:  No rashes.   Wound:   Neuro:  Alert, attentive, speech fluent, face symmetric, moves all extremities, no focal weakness. Ambulatory  Psych:  Calm, cooperative, pleasant, but emotionally fatigued  Lymphatic:        Extrem: No edema, erythema, phlebitis, cellulitis, warm and well perfused  VAD:  Peripheral    Isolation:  COVID    Lines/Tubes/Drains:    General Labs reviewed:  Recent Labs   Lab 11/22/20 0403 11/23/20 0437 11/24/20  0354   WBC 4.90 7.30 9.30   HGB 11.9* 10.8* 11.8*   HCT 36.1* 32.9* 35.5*    297 336       Recent Labs   Lab 11/21/20  0516 11/22/20  0403 11/23/20  0436 11/24/20  0354    136 138 143   K 3.7 4.1 4.0 4.1    102 103 104   CO2 23 23 24 27   BUN 12 20 22* 17   CREATININE 0.7 0.7 0.6 0.6   CALCIUM 8.6* 8.8 8.7 9.1    PROT 7.6  --  6.6 7.0   BILITOT 0.6  --  0.3 0.4   ALKPHOS 118  --  104 104   ALT 62*  --  53* 53*   AST 51*  --  34 32           Micro:  Microbiology Results (last 7 days)     Procedure Component Value Units Date/Time    Blood culture #1 **CANNOT BE ORDERED STAT** [[id number]] Collected: 11/21/20 0846    Order Status: Completed Specimen: Blood from Peripheral, Antecubital, Left Updated: 11/23/20 1032     Blood Culture, Routine No Growth to date      No Growth to date      No Growth to date    Blood culture #2 **CANNOT BE ORDERED STAT** [[id number]] Collected: 11/21/20 0915    Order Status: Completed Specimen: Blood from Peripheral, Hand, Left Updated: 11/23/20 1032     Blood Culture, Routine No Growth to date      No Growth to date      No Growth to date        Imaging Reviewed:   CXR   CTA  Lungs  1. No evidence of acute cardiopulmonary embolism.  2. Diffuse alveolar opacities primarily at the lower lobes bilaterally  left side appears to be somewhat more involved suggestive of active  Covid infection or may reflect underlying active pneumonia.  3. Numerous nodes within several of the mediastinal stations that are  likely reactive.    Cardiology:    IMPRESSION & PLAN   1. COVID pneumonia   Hypoxemia   Pleuritic/musculoskeletal plain, negative CTA, improved after constipation relieved    2. Hyperglycemia, new diagnosis of diabetes, overweight, history of DVT while on contraceptives greater than 20 years ago      Recommendations:  Continue remdesivir to day 5   ambien hs  Decadron with tight blood sugar control, Diabetes Education  Will need a primary care doctor on discharge  Albuterol, antitussives, incentive spirometry  Up in chair and prone in bed, walk in room  Trend CBC, CMP, CRP, ferritin, D-dimer  Prophylactic Lovenox with low threshold for increasing the dose  Nonsteroidals p.r.n., may require stronger pain relievers            Medical Decision Making during this encounter was  [_] Low Complexity  [_]  Moderate Complexity  [ x ] High Complexity

## 2020-11-24 NOTE — PLAN OF CARE
11/23/20 2014   Patient Assessment/Suction   Level of Consciousness (AVPU) alert   Respiratory Effort Normal;Unlabored   Expansion/Accessory Muscles/Retractions no use of accessory muscles   All Lung Fields Breath Sounds equal bilaterally;diminished   Rhythm/Pattern, Respiratory unlabored   Cough Frequency no cough   Cough Type nonproductive   PRE-TX-O2   O2 Device (Oxygen Therapy) nasal cannula   Flow (L/min) 2   SpO2 96 %   Pulse Oximetry Type Continuous   $ Pulse Oximetry - Multiple Charge Pulse Oximetry - Multiple   Pulse 82   Resp 18   Inhaler   $ Inhaler Charges MDI (Metered Dose Inahler) Treatment   Daily Review of Necessity (Inhaler) completed   Respiratory Treatment Status (Inhaler) given   Treatment Route (Inhaler) spacer/holding chamber   Patient Position (Inhaler) HOB elevated   Post Treatment Assessment (Inhaler) breath sounds unchanged   Signs of Intolerance (Inhaler) none   Education   $ Education Bronchodilator;15 min   Respiratory Evaluation   $ Care Plan Tech Time 15 min   Evaluation For New Orders   Admitting Diagnosis Covid 19

## 2020-11-24 NOTE — CONSULTS
[facility]  Adult Nutrition   Consult Note    SUMMARY     Dietitian Rounds Brief  · Repeat consult received for diabetic diet education, newly diagnosed. Please see previous nutrition note. RD spoke with RN today and requested that she confirm that diabetes diet education and RD contact info was provided to patient upon next entry into room. RD requested that RN advise patient to please contact RD via telephone to review contents of diet education packet and to answer diet related questions should they arise. RN agrees. RD will educate patient via telephone upon contact. RD also printed patient's facesheet to be provided to outpatient RD for follow up and continued diabetes management after discharge when appropriate.   · Recommend patient receive prescription for glucometer and supplies prior to discharge.   · Patient should also receive instructions and prescription for at home diabetes medications prior to discharge.   · Recommend patient see PCP or endocrinologist for continued diabetes management and monitoring.      Bridget Patel RD 11/24/2020 1:47 PM

## 2020-11-24 NOTE — PROGRESS NOTES
[facility] Medicine    Progress Note    Patient Name: Savanna West  MRN: [id number]  Patient Class: IP- Inpatient   Admission Date: 11/21/2020  4:33 AM  Length of Stay: 3  Attending Physician: Demarco Lopez MD  Primary Care Provider: Primary Doctor No  Face-to-Face encounter date: 11/24/2020  Code status:  Chief Complaint: Shortness of Breath (COVID + dx Nov 12th. States feels breathing is worse. )        Subjective:    HPI:60-year-old  female with past medical history of DVT (not currently on anticoagulation) presented to ED complaining of worsening cough, shortness of breath.  Patient states she was diagnosed with COVID on November 12th.  She states she was managing illness at home with over-the-counter medications including Mucinex, naproxen  however shortness of breath became severe overnight and she presented to emergency department.  Patient reports generalized fatigue, nasal congestion with rhinorrhea, subjective fever, chills, body aches, decreased appetite, headache, change of taste.  She denies chest pain, palpitations.  In the emergency room she she had an O2 saturation of 93% on room air was evaluated the CTA of the chest which did not reveal any pulmonary emboli (history of DVT greater than 20 years ago while on OCPs, not on anticoagulation) but did show some patchy mostly left lower lobe infiltrates.  Ultrasound of the legs was negative as well.      11/22:  started on remdesivir, steroids and supplemental oxygen as well as prophylactic Lovenox, ID consulted.    11/23: 2 L nasal cannula to achieve 93% saturation.  Received 2 units of Convalescent plasma.    11/24:  Afebrile on steroids, O2 saturation 97% on 2 L, inflammatory markers are improved.     Interval History: Patient is doing well. Family present at bedside.No concerns/issues overnight reported by the patient or the nursing staff.    Review of Systems All other Review of Systems were found to be negative  expect for that mentioned already in HPI.     Objective:     Vitals:    11/23/20 2300 11/24/20 0236 11/24/20 0734 11/24/20 0746   BP: 139/67 (!) 146/65 (!) 176/86    BP Location: Right arm Right arm Right arm    Patient Position: Lying  Sitting    Pulse: 83 73 77 77   Resp: 18 18 17 18   Temp: 97.3 °F (36.3 °C) 97.7 °F (36.5 °C) 96.4 °F (35.8 °C)    TempSrc: Oral Oral Oral    SpO2: 96% 97% 97% 97%   Weight:       Height:            Vitals reviewed.  Constitutional: No distress.   HENT: NC  Head: Atraumatic.   Cardiovascular: Normal rate, regular rhythm and normal heart sounds.   Pulmonary/Chest: Effort normal. No wheezes.   Abdominal: Soft. Bowel sounds are normal. No distension and no mass. No tenderness  Neurological: Alert.   Skin: Skin is warm and dry.   Psych: Appropriate mood and affect    Following labs were Reviewed   CBC:  Recent Labs   Lab 11/24/20  0354   WBC 9.30   HGB 11.8*   HCT 35.5*        CMP:  Recent Labs   Lab 11/24/20  0354   CALCIUM 9.1   ALBUMIN 3.3*   PROT 7.0      K 4.1   CO2 27      BUN 17   CREATININE 0.6   ALKPHOS 104   ALT 53*   AST 32   BILITOT 0.4       Micro Results  Microbiology Results (last 7 days)     Procedure Component Value Units Date/Time    Blood culture #1 **CANNOT BE ORDERED STAT** [[id number]] Collected: 11/21/20 0846    Order Status: Completed Specimen: Blood from Peripheral, Antecubital, Left Updated: 11/23/20 1032     Blood Culture, Routine No Growth to date      No Growth to date      No Growth to date    Blood culture #2 **CANNOT BE ORDERED STAT** [[id number]] Collected: 11/21/20 0915    Order Status: Completed Specimen: Blood from Peripheral, Hand, Left Updated: 11/23/20 1032     Blood Culture, Routine No Growth to date      No Growth to date      No Growth to date           Radiology Reports  Cta Chest Non-coronary (pe Study)  Result Date: 11/21/2020  IMPRESSION: 1. No evidence of acute cardiopulmonary embolism. 2. Diffuse alveolar opacities  primarily at the lower lobes bilaterally left side appears to be somewhat more involved suggestive of active Covid infection or may reflect underlying active pneumonia. 3. Numerous nodes within several of the mediastinal stations that are likely reactive. Electronically Signed by John COX on 11/21/2020 6:52 AM    X-ray Chest Ap Portable  Result Date: 11/21/2020  IMPRESSION: Diffuse groundglass opacity changes suggestive active Covid infection most profound about the left lung base and peripheral left midlung zone. Electronically Signed by John COX on 11/21/2020 7:16 AM    Us Lower Extremity Veins Bilateral  Result Date: 11/21/2020  IMPRESSION:   Negative evaluation for deep venous thrombosis regarding the lower extremity deep venous system. Electronically Signed by John COX on 11/21/2020 7:25 AM       Meds  Scheduled Meds:   albuterol  2 puff Inhalation Q6H WAKE    aspirin  81 mg Oral Daily    benzonatate  200 mg Oral TID    dexAMETHasone  6 mg Oral Daily    enoxaparin  40 mg Subcutaneous Q24H    guaiFENesin  600 mg Oral BID    insulin detemir U-100  10 Units Subcutaneous QHS    EUA remdesivir infusion  100 mg Intravenous Q24H     Continuous Infusions:  PRN Meds:.sodium chloride, acetaminophen, acetaminophen, albuterol **AND** MDI Q4H PRN, dextrose 50%, dextrose 50%, dextrose 50%, dextrose 50%, diphenhydrAMINE, glucagon (human recombinant), glucose, glucose, hydrocodone-chlorpheniramine, insulin regular, magnesium oxide, magnesium oxide, melatonin, naproxen, ondansetron, polyethylene glycol, potassium chloride, potassium chloride, potassium, sodium phosphates, potassium, sodium phosphates, potassium, sodium phosphates, promethazine (PHENERGAN) IVPB, sodium chloride 0.9%, traMADoL.    Assessment & Plan:     Active Hospital Problems    Diagnosis    *Pneumonia due to COVID-19 virus  - ID on board  - continue remdesivir d4  - continue Decadron d4  - continue to trend ferritin  and D-dimer      Diabetes mellitus due to underlying condition with hyperglycemia, without long-term current use of insulin  - uncontrolled  - increase Lantus to 15 units           Discharge Planning:   Is the patient medically ready for discharge?: no    Reason for patient still in hospital (select all that apply): Patient trending condition and Treatment    Above encounter included review of the medical records, interviewing and examining the patient face-to-face, discussion with family and other health care providers, ordering and interpreting lab/test results and formulating a plan of care.     Medical Decision Making:      [_] Low Complexity  [_] Moderate Complexity  [x] High Complexity      Demarco Lopez MD  Department of Hospital Medicine   Formerly [facility], [facility]

## 2020-11-25 VITALS
DIASTOLIC BLOOD PRESSURE: 63 MMHG | BODY MASS INDEX: 29.54 KG/M2 | TEMPERATURE: 98 F | HEIGHT: 70 IN | OXYGEN SATURATION: 95 % | RESPIRATION RATE: 16 BRPM | WEIGHT: 206.31 LBS | SYSTOLIC BLOOD PRESSURE: 120 MMHG | HEART RATE: 81 BPM

## 2020-11-25 DIAGNOSIS — U07.1 COVID-19 VIRUS DETECTED: ICD-10-CM

## 2020-11-25 PROBLEM — R10.9 FLANK PAIN: Status: RESOLVED | Noted: 2020-11-22 | Resolved: 2020-11-25

## 2020-11-25 LAB
ALBUMIN SERPL BCP-MCNC: 3.2 G/DL (ref 3.5–5.2)
ALP SERPL-CCNC: 95 U/L (ref 55–135)
ALT SERPL W/O P-5'-P-CCNC: 42 U/L (ref 10–44)
ANION GAP SERPL CALC-SCNC: 12 MMOL/L (ref 8–16)
AST SERPL-CCNC: 20 U/L (ref 10–40)
BASOPHILS # BLD AUTO: 0.02 K/UL (ref 0–0.2)
BASOPHILS NFR BLD: 0.2 % (ref 0–1.9)
BILIRUB SERPL-MCNC: 0.5 MG/DL (ref 0.1–1)
BUN SERPL-MCNC: 18 MG/DL (ref 6–20)
CALCIUM SERPL-MCNC: 8.4 MG/DL (ref 8.7–10.5)
CHLORIDE SERPL-SCNC: 101 MMOL/L (ref 95–110)
CO2 SERPL-SCNC: 25 MMOL/L (ref 23–29)
CREAT SERPL-MCNC: 0.6 MG/DL (ref 0.5–1.4)
CRP SERPL-MCNC: 2.3 MG/DL
D DIMER PPP IA.FEU-MCNC: 0.51 UG/ML FEU
DIFFERENTIAL METHOD: ABNORMAL
EOSINOPHIL # BLD AUTO: 0 K/UL (ref 0–0.5)
EOSINOPHIL NFR BLD: 0 % (ref 0–8)
ERYTHROCYTE [DISTWIDTH] IN BLOOD BY AUTOMATED COUNT: 11.6 % (ref 11.5–14.5)
EST. GFR  (AFRICAN AMERICAN): >60 ML/MIN/1.73 M^2
EST. GFR  (NON AFRICAN AMERICAN): >60 ML/MIN/1.73 M^2
FERRITIN SERPL-MCNC: 371 NG/ML (ref 20–300)
GLUCOSE SERPL-MCNC: 206 MG/DL (ref 70–110)
GLUCOSE SERPL-MCNC: 211 MG/DL (ref 70–110)
GLUCOSE SERPL-MCNC: 279 MG/DL (ref 70–110)
GLUCOSE SERPL-MCNC: 373 MG/DL (ref 70–110)
HCT VFR BLD AUTO: 35.5 % (ref 37–48.5)
HGB BLD-MCNC: 11.6 G/DL (ref 12–16)
IMM GRANULOCYTES # BLD AUTO: 0.28 K/UL (ref 0–0.04)
IMM GRANULOCYTES NFR BLD AUTO: 3.4 % (ref 0–0.5)
LYMPHOCYTES # BLD AUTO: 1.9 K/UL (ref 1–4.8)
LYMPHOCYTES NFR BLD: 22.8 % (ref 18–48)
MCH RBC QN AUTO: 29.6 PG (ref 27–31)
MCHC RBC AUTO-ENTMCNC: 32.7 G/DL (ref 32–36)
MCV RBC AUTO: 91 FL (ref 82–98)
MONOCYTES # BLD AUTO: 0.7 K/UL (ref 0.3–1)
MONOCYTES NFR BLD: 8.2 % (ref 4–15)
NEUTROPHILS # BLD AUTO: 5.5 K/UL (ref 1.8–7.7)
NEUTROPHILS NFR BLD: 65.4 % (ref 38–73)
NRBC BLD-RTO: 0 /100 WBC
PLATELET # BLD AUTO: 355 K/UL (ref 150–350)
PMV BLD AUTO: 10.3 FL (ref 9.2–12.9)
POTASSIUM SERPL-SCNC: 3.8 MMOL/L (ref 3.5–5.1)
PROT SERPL-MCNC: 6.8 G/DL (ref 6–8.4)
RBC # BLD AUTO: 3.92 M/UL (ref 4–5.4)
SODIUM SERPL-SCNC: 138 MMOL/L (ref 136–145)
TROPONIN I SERPL DL<=0.01 NG/ML-MCNC: <0.03 NG/ML
WBC # BLD AUTO: 8.33 K/UL (ref 3.9–12.7)

## 2020-11-25 PROCEDURE — 82728 ASSAY OF FERRITIN: CPT

## 2020-11-25 PROCEDURE — 36415 COLL VENOUS BLD VENIPUNCTURE: CPT

## 2020-11-25 PROCEDURE — 25000242 PHARM REV CODE 250 ALT 637 W/ HCPCS: Performed by: STUDENT IN AN ORGANIZED HEALTH CARE EDUCATION/TRAINING PROGRAM

## 2020-11-25 PROCEDURE — 99232 SBSQ HOSP IP/OBS MODERATE 35: CPT | Mod: ,,, | Performed by: INTERNAL MEDICINE

## 2020-11-25 PROCEDURE — 94761 N-INVAS EAR/PLS OXIMETRY MLT: CPT

## 2020-11-25 PROCEDURE — 25000003 PHARM REV CODE 250: Performed by: INTERNAL MEDICINE

## 2020-11-25 PROCEDURE — 85379 FIBRIN DEGRADATION QUANT: CPT

## 2020-11-25 PROCEDURE — 80053 COMPREHEN METABOLIC PANEL: CPT

## 2020-11-25 PROCEDURE — 27000221 HC OXYGEN, UP TO 24 HOURS

## 2020-11-25 PROCEDURE — 63600175 PHARM REV CODE 636 W HCPCS: Performed by: INTERNAL MEDICINE

## 2020-11-25 PROCEDURE — 85025 COMPLETE CBC W/AUTO DIFF WBC: CPT

## 2020-11-25 PROCEDURE — 99900035 HC TECH TIME PER 15 MIN (STAT)

## 2020-11-25 PROCEDURE — 99232 PR SUBSEQUENT HOSPITAL CARE,LEVL II: ICD-10-PCS | Mod: ,,, | Performed by: INTERNAL MEDICINE

## 2020-11-25 PROCEDURE — 25000003 PHARM REV CODE 250: Performed by: STUDENT IN AN ORGANIZED HEALTH CARE EDUCATION/TRAINING PROGRAM

## 2020-11-25 PROCEDURE — C9399 UNCLASSIFIED DRUGS OR BIOLOG: HCPCS | Performed by: STUDENT IN AN ORGANIZED HEALTH CARE EDUCATION/TRAINING PROGRAM

## 2020-11-25 PROCEDURE — 94640 AIRWAY INHALATION TREATMENT: CPT

## 2020-11-25 PROCEDURE — 86140 C-REACTIVE PROTEIN: CPT

## 2020-11-25 PROCEDURE — 84484 ASSAY OF TROPONIN QUANT: CPT

## 2020-11-25 PROCEDURE — 63600175 PHARM REV CODE 636 W HCPCS: Performed by: STUDENT IN AN ORGANIZED HEALTH CARE EDUCATION/TRAINING PROGRAM

## 2020-11-25 RX ORDER — ALBUTEROL SULFATE 90 UG/1
2 AEROSOL, METERED RESPIRATORY (INHALATION)
Status: DISCONTINUED | OUTPATIENT
Start: 2020-11-25 | End: 2020-11-25 | Stop reason: HOSPADM

## 2020-11-25 RX ORDER — METFORMIN HYDROCHLORIDE EXTENDED-RELEASE TABLETS 500 MG/1
500 TABLET, FILM COATED, EXTENDED RELEASE ORAL 2 TIMES DAILY WITH MEALS
Qty: 60 TABLET | Refills: 0 | Status: SHIPPED | OUTPATIENT
Start: 2020-11-25 | End: 2020-11-27

## 2020-11-25 RX ORDER — INSULIN PUMP SYRINGE, 3 ML
EACH MISCELLANEOUS
Qty: 1 EACH | Refills: 0 | Status: SHIPPED | OUTPATIENT
Start: 2020-11-25 | End: 2021-11-25

## 2020-11-25 RX ORDER — DEXAMETHASONE 4 MG/1
4 TABLET ORAL DAILY
Qty: 5 TABLET | Refills: 0 | Status: SHIPPED | OUTPATIENT
Start: 2020-11-25 | End: 2020-12-01

## 2020-11-25 RX ORDER — BENZONATATE 200 MG/1
200 CAPSULE ORAL 3 TIMES DAILY
Qty: 60 CAPSULE | Refills: 0 | Status: SHIPPED | OUTPATIENT
Start: 2020-11-25 | End: 2020-12-05

## 2020-11-25 RX ORDER — ALBUTEROL SULFATE 90 UG/1
2 AEROSOL, METERED RESPIRATORY (INHALATION) EVERY 6 HOURS PRN
Qty: 18 G | Refills: 0 | Status: SHIPPED | OUTPATIENT
Start: 2020-11-25 | End: 2020-12-28

## 2020-11-25 RX ADMIN — REMDESIVIR 100 MG: 100 INJECTION, POWDER, LYOPHILIZED, FOR SOLUTION INTRAVENOUS at 09:11

## 2020-11-25 RX ADMIN — HUMAN INSULIN 15 UNITS: 100 INJECTION, SOLUTION SUBCUTANEOUS at 04:11

## 2020-11-25 RX ADMIN — DEXAMETHASONE 6 MG: 2 TABLET ORAL at 09:11

## 2020-11-25 RX ADMIN — BENZONATATE 200 MG: 100 CAPSULE ORAL at 02:11

## 2020-11-25 RX ADMIN — ASPIRIN 81 MG: 81 TABLET, DELAYED RELEASE ORAL at 09:11

## 2020-11-25 RX ADMIN — GUAIFENESIN 600 MG: 600 TABLET, EXTENDED RELEASE ORAL at 09:11

## 2020-11-25 RX ADMIN — HUMAN INSULIN 6 UNITS: 100 INJECTION, SOLUTION SUBCUTANEOUS at 09:11

## 2020-11-25 RX ADMIN — ALBUTEROL SULFATE 2 PUFF: 90 AEROSOL, METERED RESPIRATORY (INHALATION) at 09:11

## 2020-11-25 RX ADMIN — ALBUTEROL SULFATE 2 PUFF: 90 AEROSOL, METERED RESPIRATORY (INHALATION) at 02:11

## 2020-11-25 RX ADMIN — INSULIN DETEMIR 5 UNITS: 100 INJECTION, SOLUTION SUBCUTANEOUS at 09:11

## 2020-11-25 RX ADMIN — ENOXAPARIN SODIUM 40 MG: 40 INJECTION SUBCUTANEOUS at 04:11

## 2020-11-25 RX ADMIN — BENZONATATE 200 MG: 100 CAPSULE ORAL at 09:11

## 2020-11-25 NOTE — PROGRESS NOTES
Consult Note  Infectious Disease    Reason for Consult:  COVID pneumonia,?  Convalescent plasma    HPI: Savanna West is a  60 y.o. female Who presented to the emergency room yesterday early in the morning complaining of increased shortness of breath.  She tested positive for COVID-19 on 11/12.  Throughout this past week she has had fevers, chills, sweats, anorexia, headaches, some dizziness, dyspnea on exertion and was feeling better but became more short of breath with chest heaviness prompting her presentation to the emergency room.  In the emergency room she she had an O2 saturation of 93% on room air was evaluated the CTA of the chest which did not reveal any pulmonary emboli (history of DVT greater than 20 years ago while on OCPs, not on anticoagulation) but did show some patchy mostly left lower lobe infiltrates.  Ultrasound of the legs was negative as well.  She was admitted to the Hospital Medicine service and started on remdesivir, steroids and supplemental oxygen as well as prophylactic Lovenox.    11/23:  Afebrile on steroids, requiring 2 L nasal cannula to achieve 93% saturation.  Received 2 units of Convalescent plasma without incident (?  Mild stomach ache).  CRP is much improved.  She is up and down to the bathroom but have not been out of bed yet today,2:30 p.m. but I did prepare the chair and   she got up to it.  She can pull 1500 cc on incentive spirometry with coaching .  She is eating a bit, drinking well, drinking prune juice and anticipating its effect.  11/24:  Afebrile on steroids, O2 saturation 97% on 2 L, inflammatory markers are improved.  Blood sugars are still quite high. Emotionally exhausted from no sleep, new diagnosis of diabetes, fear of DVT, prolonged illness. Did have relief of right flank pain after 3 BMs. Still wants the prune juice.   11/25:  Afebrile on steroids, last 2 measurements have 94-95% saturation on room air.  Remdesivir is complete.  Blood sugars remain very  elevated. I had her walk back and forth in room on RA and she stayed 91-94% without dyspnea. She can pull 2000 on IS.     COVID 19:  11/12, repeated 11/21  Procalcitonin:   Troponin:  Negative x3   BNP :  D-dimer:  0.60, 0.47, 0.58  LDH:  Triglycerides:  Ferritin:  534, 529, 538, 474  azithromycin :   Remdesivir :  11/21  Convalescent plasma:  11/22  Blood type:  A positive  IL-6:  Tocilizumab:  Recent Labs   Lab 11/23/20 0436 11/24/20  0354 11/25/20  0329   CRP 6.68* 4.14* 2.30*         EXAM & DIAGNOSTICS REVIEWED:   Vitals:     Temp:  [96.2 °F (35.7 °C)-98.1 °F (36.7 °C)]   Temp: 97.8 °F (36.6 °C) (11/25/20 1507)  Pulse: 81 (11/25/20 1507)  Resp: 16 (11/25/20 1507)  BP: 120/63 (11/25/20 1507)  SpO2: 95 % (11/25/20 1507)  No intake or output data in the 24 hours ending 11/25/20 1653    General:  In NAD. Alert and attentive, cooperative, comfortable  Eyes:  Anicteric,   EOMI  ENT:  No ulcers, exudates, thrush, nares patent, dentition is excellent  Neck:  supple,    Lungs: Clear   Heart:  RRR, no gallop/murmur/rub noted  Abd:   .  :  Voids     Musc:  Joints without effusion, swelling, erythema, synovitis, muscle wasting.   Skin:  No rashes.   Wound:   Neuro:  Alert, attentive, speech fluent, face symmetric, moves all extremities, no focal weakness. Ambulatory  Psych:  Calm, cooperative, pleasant,   Lymphatic:        Extrem: No edema, erythema, phlebitis, cellulitis, warm and well perfused  VAD:  Peripheral    Isolation:  COVID    Lines/Tubes/Drains:    General Labs reviewed:  Recent Labs   Lab 11/23/20 0437 11/24/20 0354 11/25/20  0329   WBC 7.30 9.30 8.33   HGB 10.8* 11.8* 11.6*   HCT 32.9* 35.5* 35.5*    336 355*       Recent Labs   Lab 11/23/20  0436 11/24/20  0354 11/25/20  0329    143 138   K 4.0 4.1 3.8    104 101   CO2 24 27 25   BUN 22* 17 18   CREATININE 0.6 0.6 0.6   CALCIUM 8.7 9.1 8.4*   PROT 6.6 7.0 6.8   BILITOT 0.3 0.4 0.5   ALKPHOS 104 104 95   ALT 53* 53* 42   AST 34 32 20            Micro:  Microbiology Results (last 7 days)     Procedure Component Value Units Date/Time    Blood culture #1 **CANNOT BE ORDERED STAT** [[id number]] Collected: 11/21/20 0846    Order Status: Completed Specimen: Blood from Peripheral, Antecubital, Left Updated: 11/25/20 1032     Blood Culture, Routine No Growth to date      No Growth to date      No Growth to date      No Growth to date      No Growth to date    Blood culture #2 **CANNOT BE ORDERED STAT** [[id number]] Collected: 11/21/20 0915    Order Status: Completed Specimen: Blood from Peripheral, Hand, Left Updated: 11/25/20 1032     Blood Culture, Routine No Growth to date      No Growth to date      No Growth to date      No Growth to date      No Growth to date        Imaging Reviewed:   CXR   CTA  Lungs  1. No evidence of acute cardiopulmonary embolism.  2. Diffuse alveolar opacities primarily at the lower lobes bilaterally  left side appears to be somewhat more involved suggestive of active  Covid infection or may reflect underlying active pneumonia.  3. Numerous nodes within several of the mediastinal stations that are  likely reactive.    Cardiology:    IMPRESSION & PLAN   1. COVID pneumonia   Hypoxemia   Pleuritic/musculoskeletal plain, negative CTA, improved after constipation relieved    2. Hyperglycemia, new diagnosis of diabetes, overweight, history of DVT while on contraceptives greater than 20 years ago      Recommendations:    Ok to go home  Would continue decadron, maybe 4 mg for another 5 days  Oral hypoglycemic for discharge?  She was advised to get a pulse ox. She will need a rx for glucometer and test strips  She will need Rx for antitussives  She is to continue Albuterol, antitussives, incentive spirometry  Up in chair and prone in bed, walk in house     Will need a primary care doctor on discharge     As she has had a DVT in the past consider eliquis for 6 weeks            Medical Decision Making during this encounter was  [_] Low  Complexity  [_] Moderate Complexity  [ x ] High Complexity

## 2020-11-25 NOTE — PROGRESS NOTES
[facility] Medicine    Progress Note    Patient Name: Savanna West  MRN: [id number]  Patient Class: IP- Inpatient   Admission Date: 11/21/2020  4:33 AM  Length of Stay: 4  Attending Physician: Demarco Lopez MD  Primary Care Provider: Primary Doctor No  Face-to-Face encounter date: 11/25/2020  Code status:  Chief Complaint: Shortness of Breath (COVID + dx Nov 12th. States feels breathing is worse. )        Subjective:    HPI:60-year-old  female with past medical history of DVT (not currently on anticoagulation) presented to ED complaining of worsening cough, shortness of breath.  Patient states she was diagnosed with COVID on November 12th.  She states she was managing illness at home with over-the-counter medications including Mucinex, naproxen  however shortness of breath became severe overnight and she presented to emergency department.  Patient reports generalized fatigue, nasal congestion with rhinorrhea, subjective fever, chills, body aches, decreased appetite, headache, change of taste.  She denies chest pain, palpitations.  In the emergency room she she had an O2 saturation of 93% on room air was evaluated the CTA of the chest which did not reveal any pulmonary emboli (history of DVT greater than 20 years ago while on OCPs, not on anticoagulation) but did show some patchy mostly left lower lobe infiltrates.  Ultrasound of the legs was negative as well.      11/22:  started on remdesivir, steroids and supplemental oxygen as well as prophylactic Lovenox, ID consulted.    11/23: 2 L nasal cannula to achieve 93% saturation.  Received 2 units of Convalescent plasma.    11/24:  Afebrile on steroids, O2 saturation 97% on 2 L, inflammatory markers are improved.     11/25:  Patient doing well, has no complaints and states that she is able to breathe well without oxygen.    Interval History: Patient is doing well.  No concerns/issues overnight reported by the patient or the nursing  staff.    Review of Systems All other Review of Systems were found to be negative expect for that mentioned already in HPI.     Objective:     Vitals:    11/25/20 0705 11/25/20 0913 11/25/20 1209 11/25/20 1435   BP: 138/76  125/74    BP Location: Right arm  Right arm    Patient Position: Lying  Lying    Pulse: 77 103 78 83   Resp: 16 16 18 15   Temp: 96.3 °F (35.7 °C)  98.1 °F (36.7 °C)    TempSrc: Oral  Oral    SpO2: 96% 96% 95% (!) 94%   Weight:       Height:            Vitals reviewed.  Constitutional: No distress.   HENT: NC  Head: Atraumatic.   Cardiovascular: Normal rate, regular rhythm and normal heart sounds.   Pulmonary/Chest: Effort normal. No wheezes.   Abdominal: Soft. Bowel sounds are normal. No distension and no mass. No tenderness  Neurological: Alert.   Skin: Skin is warm and dry.   Psych: Appropriate mood and affect    Following labs were Reviewed   CBC:  Recent Labs   Lab 11/25/20  0329   WBC 8.33   HGB 11.6*   HCT 35.5*   *     CMP:  Recent Labs   Lab 11/25/20  0329   CALCIUM 8.4*   ALBUMIN 3.2*   PROT 6.8      K 3.8   CO2 25      BUN 18   CREATININE 0.6   ALKPHOS 95   ALT 42   AST 20   BILITOT 0.5       Micro Results  Microbiology Results (last 7 days)     Procedure Component Value Units Date/Time    Blood culture #1 **CANNOT BE ORDERED STAT** [[id number]] Collected: 11/21/20 0846    Order Status: Completed Specimen: Blood from Peripheral, Antecubital, Left Updated: 11/25/20 1032     Blood Culture, Routine No Growth to date      No Growth to date      No Growth to date      No Growth to date      No Growth to date    Blood culture #2 **CANNOT BE ORDERED STAT** [[id number]] Collected: 11/21/20 0915    Order Status: Completed Specimen: Blood from Peripheral, Hand, Left Updated: 11/25/20 1032     Blood Culture, Routine No Growth to date      No Growth to date      No Growth to date      No Growth to date      No Growth to date           Radiology Reports  Cta Chest Non-coronary (pe  Study)  Result Date: 11/21/2020  IMPRESSION: 1. No evidence of acute cardiopulmonary embolism. 2. Diffuse alveolar opacities primarily at the lower lobes bilaterally left side appears to be somewhat more involved suggestive of active Covid infection or may reflect underlying active pneumonia. 3. Numerous nodes within several of the mediastinal stations that are likely reactive. Electronically Signed by John COX on 11/21/2020 6:52 AM    X-ray Chest Ap Portable  Result Date: 11/21/2020  IMPRESSION: Diffuse groundglass opacity changes suggestive active Covid infection most profound about the left lung base and peripheral left midlung zone. Electronically Signed by John COX on 11/21/2020 7:16 AM    Us Lower Extremity Veins Bilateral  Result Date: 11/21/2020  IMPRESSION:   Negative evaluation for deep venous thrombosis regarding the lower extremity deep venous system. Electronically Signed by John COX on 11/21/2020 7:25 AM       Meds  Scheduled Meds:   albuterol  2 puff Inhalation Q6H WAKE    aspirin  81 mg Oral Daily    benzonatate  200 mg Oral TID    dexAMETHasone  6 mg Oral Daily    enoxaparin  40 mg Subcutaneous Q24H    guaiFENesin  600 mg Oral BID    insulin detemir U-100  20 Units Subcutaneous QHS     Continuous Infusions:  PRN Meds:.sodium chloride, acetaminophen, acetaminophen, albuterol **AND** MDI Q4H PRN, dextrose 50%, dextrose 50%, dextrose 50%, dextrose 50%, diphenhydrAMINE, glucagon (human recombinant), glucose, glucose, hydrocodone-chlorpheniramine, insulin regular, magnesium oxide, magnesium oxide, melatonin, naproxen, ondansetron, polyethylene glycol, potassium chloride, potassium chloride, potassium, sodium phosphates, potassium, sodium phosphates, potassium, sodium phosphates, promethazine (PHENERGAN) IVPB, sodium chloride 0.9%, traMADoL, zolpidem.    Assessment & Plan:     Active Hospital Problems    Diagnosis    *Pneumonia due to COVID-19 virus  - ID on  board  - continue remdesivir d5  - continue Decadron d5  - continue to trend ferritin and D-dimer      Diabetes mellitus due to underlying condition with hyperglycemia, without long-term current use of insulin  - uncontrolled  - this may be secondary to steroid  - continue to cover with insulin sliding scale  - regular Accu-Chek             Discharge Planning:   Is the patient medically ready for discharge?: yes    Reason for patient still in hospital (select all that apply): Patient trending condition and Treatment    Above encounter included review of the medical records, interviewing and examining the patient face-to-face, discussion with family and other health care providers, ordering and interpreting lab/test results and formulating a plan of care.     Medical Decision Making:      [_] Low Complexity  [_] Moderate Complexity  [x] High Complexity      Demarco Lopez MD  Department of Hospital Medicine   [facility]

## 2020-11-25 NOTE — PLAN OF CARE
11/24/20 2011   Patient Assessment/Suction   Level of Consciousness (AVPU) alert   Respiratory Effort Normal;Unlabored   Expansion/Accessory Muscles/Retractions no use of accessory muscles   All Lung Fields Breath Sounds equal bilaterally;diminished;clear   Rhythm/Pattern, Respiratory unlabored   Cough Frequency infrequent   Cough Type good;nonproductive;dry   PRE-TX-O2   O2 Device (Oxygen Therapy) nasal cannula   Flow (L/min) 2   SpO2 100 %   Pulse Oximetry Type Continuous   $ Pulse Oximetry - Multiple Charge Pulse Oximetry - Multiple   Pulse 80   Resp 18   Inhaler   $ Inhaler Charges MDI (Metered Dose Inahler) Treatment   Daily Review of Necessity (Inhaler) completed   Respiratory Treatment Status (Inhaler) given   Treatment Route (Inhaler) spacer/holding chamber   Patient Position (Inhaler) HOB elevated   Post Treatment Assessment (Inhaler) breath sounds unchanged   Signs of Intolerance (Inhaler) none   Education   $ Education Bronchodilator;15 min   Respiratory Evaluation   $ Care Plan Tech Time 15 min   Evaluation For Re-Eval 3 day   Admitting Diagnosis Covid 19

## 2020-11-25 NOTE — PLAN OF CARE
Plan of care discussed with patient. Patient is free of fall/trauma/injury. Denies CP, SOB, or pain/discomfort.  2L NC with stats > 96% overnight. All questions addressed. Will continue to monitor.

## 2020-11-25 NOTE — PLAN OF CARE
11/25/20 0913   Patient Assessment/Suction   Level of Consciousness (AVPU) alert   Respiratory Effort Unlabored;Normal   Expansion/Accessory Muscles/Retractions no use of accessory muscles   All Lung Fields Breath Sounds equal bilaterally   Rhythm/Pattern, Respiratory unlabored;pattern regular;depth regular   Cough Frequency infrequent   Cough Type good;nonproductive   PRE-TX-O2   O2 Device (Oxygen Therapy) nasal cannula   $ Is the patient on Low Flow Oxygen? Yes   Flow (L/min) 2   SpO2 96 %   Pulse Oximetry Type Continuous   $ Pulse Oximetry - Multiple Charge Pulse Oximetry - Multiple   Oximetry Probe Site Assessed   Pulse 103   Resp 16   Respiratory Evaluation   $ Care Plan Tech Time 15 min

## 2020-11-26 LAB
BACTERIA BLD CULT: NORMAL
BACTERIA BLD CULT: NORMAL

## 2020-11-26 NOTE — DISCHARGE INSTRUCTIONS
You have been diagnosed with diabetes and has been started on metformin to be taking twice daily.  It is important that you follow-up with the primary care doctor in a week to help fine-tune your diabetes medication.  Please check your blood sugar twice daily.  We have also placed you on a blood thinner to help prevent clots.

## 2020-11-26 NOTE — DISCHARGE SUMMARY
[facility] Medicine  Discharge Summary      Patient Name: Savanna West  MRN: [id number]  Admission Date: 11/21/2020  Hospital Length of Stay: 4 days  Discharge Date and Time:  11/25/2020 6:19 PM  Attending Physician: Demarco Lopez MD   Discharging Provider: Demarco Lopez MD  Primary Care Provider: Primary Doctor Liz      HPI  60-year-old  female with past medical history of DVT (not currently on anticoagulation) presented to ED complaining of worsening cough, shortness of breath.  Patient states she was diagnosed with COVID on November 12th.  She states she was managing illness at home with over-the-counter medications including Mucinex, naproxen  however shortness of breath became severe overnight and she presented to emergency department.  Patient reports generalized fatigue, nasal congestion with rhinorrhea, subjective fever, chills, body aches, decreased appetite, headache, change of taste.  She denies chest pain, palpitations.  In the emergency room she she had an O2 saturation of 93% on room air was evaluated the CTA of the chest which did not reveal any pulmonary emboli (history of DVT greater than 20 years ago while on OCPs, not on anticoagulation) but did show some patchy mostly left lower lobe infiltrates.  Ultrasound of the legs was negative as well.      Hospital Course:     11/22:  started on remdesivir, steroids and supplemental oxygen as well as prophylactic Lovenox, ID consulted.     11/23: 2 L nasal cannula to achieve 93% saturation.  Received 2 units of Convalescent plasma.     11/24:  Afebrile on steroids, O2 saturation 97% on 2 L, inflammatory markers are improved.      11/25:  Patient doing well, has no complaints and states that she is able to breathe well without oxygen.     Sugars was uncontrolled during her admission and patient was diagnosed with diabetes with HbA1c of 9.5.  The patient was discharged on metformin extended release b.i.d. 500 and was  asked to follow-up with the primary care doctor for further fine tuning of a blood pressure medication.  She was also placed on antitussives, Decadron 4 mg daily for 5 more days and also Eliquis.    Physical examination on discharge  Constitutional: No distress.   HENT: NC  Head: Atraumatic.   Cardiovascular: Normal rate, regular rhythm and normal heart sounds.   Pulmonary/Chest: Effort normal. No wheezes.   Abdominal: Soft. Bowel sounds are normal. No distension and no mass. No tenderness  Neurological: Alert.   Skin: Skin is warm and dry.   Psych: Appropriate mood and affect    Consults:   Consults (From admission, onward)        Status Ordering Provider     Inpatient consult to Hospitalist  Once     Provider:  Quinton Isbell DO    Acknowledged EVELIN KHAN     Inpatient consult to Infectious Diseases  Once     Provider:  Lino Virgen MD    Completed QUINTON ISBELL     Inpatient consult to Registered Dietitian/Nutritionist  Once     Provider:  (Not yet assigned)    Completed QUINTON ISBELL     Inpatient consult to Registered Dietitian/Nutritionist  Once     Provider:  (Not yet assigned)    Completed LINO VIRGEN     Inpatient consult to Social Work/Case Management  Once     Provider:  (Not yet assigned)    Acknowledged LINO VIRGEN     Pharmacy Remdesivir Consult  Once     Provider:  (Not yet assigned)    Acknowledged QUINTON ISBELL          No new Assessment & Plan notes have been filed under this hospital service since the last note was generated.  Service: Hospital Medicine    Final Active Diagnoses:    Diagnosis Date Noted POA    PRINCIPAL PROBLEM:  Pneumonia due to COVID-19 virus [U07.1, J12.89] 11/21/2020 Yes    Diabetes mellitus due to underlying condition with hyperglycemia, without long-term current use of insulin [E08.65]  No      Problems Resolved During this Admission:    Diagnosis Date Noted Date Resolved POA    Flank pain [R10.9] 11/22/2020 11/25/2020 Yes    SOB  (shortness of breath) [R06.02]  11/25/2020 Yes       Discharged Condition: good    Disposition: Home or Self Care    Follow Up:  Follow-up Information     primary doctor In 1 week.    Why: diabetes mangement  Contact information:  schedule an appointment within 1 week           Viktoria Virgen MD In 3 weeks.    Specialty: Infectious Diseases  Contact information:  [address] [facility]  [address]  [place] [address]  [phone]                 Patient Instructions:      Diet diabetic     Activity as tolerated       Significant Diagnostic Studies: Labs:   BMP:   Recent Labs   Lab 11/24/20  0354 11/25/20  0329   * 279*    138   K 4.1 3.8    101   CO2 27 25   BUN 17 18   CREATININE 0.6 0.6   CALCIUM 9.1 8.4*   , CMP   Recent Labs   Lab 11/24/20  0354 11/25/20  0329    138   K 4.1 3.8    101   CO2 27 25   * 279*   BUN 17 18   CREATININE 0.6 0.6   CALCIUM 9.1 8.4*   PROT 7.0 6.8   ALBUMIN 3.3* 3.2*   BILITOT 0.4 0.5   ALKPHOS 104 95   AST 32 20   ALT 53* 42   ANIONGAP 12 12   ESTGFRAFRICA >60.0 >60.0   EGFRNONAA >60.0 >60.0   , CBC   Recent Labs   Lab 11/24/20  0354 11/25/20  0329   WBC 9.30 8.33   HGB 11.8* 11.6*   HCT 35.5* 35.5*    355*   , A1C:   Recent Labs   Lab 11/21/20  1423   HGBA1C 9.5*    and All labs within the past 24 hours have been reviewed  Microbiology:   Blood Culture   Lab Results   Component Value Date    LABBLOO No Growth to date 11/21/2020    LABBLOO No Growth to date 11/21/2020    LABBLOO No Growth to date 11/21/2020    LABBLOO No Growth to date 11/21/2020    LABBLOO No Growth to date 11/21/2020    and Sputum Culture No results found for: GSRESP, RESPIRATORYC  Radiology: X-Ray: CXR: X-Ray Chest 1 View (CXR): No results found for this visit on 11/21/20.  CT scan: CT ABDOMEN PELVIS WITH CONTRAST: No results found for this visit on 11/21/20. and CT ABDOMEN PELVIS WITHOUT CONTRAST: No results found for this visit on 11/21/20.    Pending Diagnostic Studies:      None         Medications:  Reconciled Home Medications:      Medication List      START taking these medications    albuterol 90 mcg/actuation inhaler  Commonly known as: PROVENTIL HFA  Inhale 2 puffs into the lungs every 6 (six) hours as needed for Wheezing. Rescue     apixaban 5 mg Tab  Commonly known as: ELIQUIS  Take 1 tablet (5 mg total) by mouth 2 (two) times daily.     benzonatate 200 MG capsule  Commonly known as: TESSALON  Take 1 capsule (200 mg total) by mouth 3 (three) times daily. for 10 days     blood sugar diagnostic Strp  60 strips by Misc.(Non-Drug; Combo Route) route 2 (two) times a day.     blood-glucose meter kit  Use as instructed     dexAMETHasone 4 MG Tab  Commonly known as: DECADRON  Take 1 tablet (4 mg total) by mouth once daily. for 5 days     metFORMIN 500 mg 24hr tablet  Commonly known as: FORTAMET  Take 1 tablet (500 mg total) by mouth 2 (two) times daily with meals.        CONTINUE taking these medications    ALIGN 10.5 mg (10 million cell) Chew  Generic drug: Bifidobacterium infantis  Take 1 tablet by mouth once daily.     aspirin 81 MG EC tablet  Commonly known as: ECOTRIN  Take 81 mg by mouth once daily.     B12 5,000-100 mcg Lozg  Generic drug: cyanocobalamin-cobamamide  Place 1 lozenge under the tongue once daily.     CORICIDIN HBP CHEST LINDA-COUGH  mg Cap  Generic drug: dextromethorphan-guaifenesin  Take 1 capsule by mouth every 4 to 6 hours as needed.     diphenhydrAMINE 25 mg capsule  Commonly known as: BENADRYL  Take 25 mg by mouth every 6 (six) hours as needed for Itching.     FLAXSEED OIL MISC  by Misc.(Non-Drug; Combo Route) route.     guaiFENesin 600 mg 12 hr tablet  Commonly known as: MUCINEX  Take 1,200 mg by mouth 2 (two) times daily.     HAIR,SKIN AND NAILS ORAL  Take 1 tablet by mouth once daily.     magnesium 30 mg Tab  Take 30 mg by mouth once.     mupirocin 2 % ointment  Commonly known as: BACTROBAN  Apply topically 2 (two) times daily. Apply to affected area      ranitidine 150 MG tablet  Commonly known as: ZANTAC  Take 150 mg by mouth 2 (two) times daily.     VITAMIN D3 50 mcg (2,000 unit) Tab  Generic drug: cholecalciferol (vitamin D3)  Take 1 tablet by mouth once daily.     vitamin E 400 UNIT capsule  Take 400 Units by mouth once daily.     ZINC-15 ORAL  Take 1 tablet by mouth once daily.            Indwelling Lines/Drains at time of discharge:   Lines/Drains/Airways     None                 Time spent on the discharge of patient: 40 minutes  Patient was seen and examined on the date of discharge and determined to be suitable for discharge.         Demarco Lopez MD  Department of Hospital Medicine  [facility]

## 2020-11-27 ENCOUNTER — TELEPHONE (OUTPATIENT)
Dept: INFECTIOUS DISEASES | Facility: HOSPITAL | Age: 60
End: 2020-11-27

## 2020-11-27 DIAGNOSIS — E08.65 DIABETES MELLITUS DUE TO UNDERLYING CONDITION WITH HYPERGLYCEMIA, WITHOUT LONG-TERM CURRENT USE OF INSULIN: Primary | ICD-10-CM

## 2020-11-28 NOTE — TELEPHONE ENCOUNTER
"Patient called the answering service because  metformin was not affordable for her.  She would like generic metformin.    She said that pharmacy has been trying to get in touch with us " since Wednesday."  I explained that metformin was prescribed by hospitalist and not by Infectious Disease Service.  I also explained that diabetes management is done by primary care physician and she will need to fine tune this treatment with Dr. Zabrina Sunshine.    Nevertheless I still accommodated her request and switched her to generic metformin.  250 mg twice a day, 30 minutes before her to be meals of the day. 30 days; no refill.  Further treatment as per primary care physician.    I called [facility] pharmacy and gave a verbal order.  "

## 2020-11-30 ENCOUNTER — TELEPHONE (OUTPATIENT)
Dept: INFECTIOUS DISEASES | Facility: CLINIC | Age: 60
End: 2020-11-30

## 2020-11-30 ENCOUNTER — OFFICE VISIT (OUTPATIENT)
Dept: FAMILY MEDICINE | Facility: CLINIC | Age: 60
End: 2020-11-30
Payer: COMMERCIAL

## 2020-11-30 DIAGNOSIS — Z00.00 HEALTH CARE MAINTENANCE: ICD-10-CM

## 2020-11-30 DIAGNOSIS — E08.65 DIABETES MELLITUS DUE TO UNDERLYING CONDITION WITH HYPERGLYCEMIA, WITHOUT LONG-TERM CURRENT USE OF INSULIN: Primary | ICD-10-CM

## 2020-11-30 DIAGNOSIS — U07.1 PNEUMONIA DUE TO COVID-19 VIRUS: ICD-10-CM

## 2020-11-30 DIAGNOSIS — J12.82 PNEUMONIA DUE TO COVID-19 VIRUS: ICD-10-CM

## 2020-11-30 DIAGNOSIS — E66.3 OVERWEIGHT WITH BODY MASS INDEX (BMI) 25.0-29.9: ICD-10-CM

## 2020-11-30 PROCEDURE — 99203 OFFICE O/P NEW LOW 30 MIN: CPT | Mod: 95,,, | Performed by: FAMILY MEDICINE

## 2020-11-30 PROCEDURE — 1111F DSCHRG MED/CURRENT MED MERGE: CPT | Mod: ,,, | Performed by: FAMILY MEDICINE

## 2020-11-30 PROCEDURE — 1111F PR DISCHARGE MEDS RECONCILED W/ CURRENT OUTPATIENT MED LIST: ICD-10-PCS | Mod: ,,, | Performed by: FAMILY MEDICINE

## 2020-11-30 PROCEDURE — 99203 PR OFFICE/OUTPT VISIT, NEW, LEVL III, 30-44 MIN: ICD-10-PCS | Mod: 95,,, | Performed by: FAMILY MEDICINE

## 2020-11-30 RX ORDER — METFORMIN HYDROCHLORIDE 500 MG/1
500 TABLET ORAL 2 TIMES DAILY WITH MEALS
Qty: 180 TABLET | Refills: 3 | Status: SHIPPED | OUTPATIENT
Start: 2020-11-30 | End: 2020-12-28 | Stop reason: SDUPTHER

## 2020-11-30 NOTE — PROGRESS NOTES
SUBJECTIVE:    Patient ID: Savanna West is a 60 y.o. female.    Chief Complaint: No chief complaint on file.    Pleasant 60-year-old female presents for virtual visit today to establish care.  She has no significant past medical history and has not seen a primary care provider in many years.  She has recently been discharged from hospital on 11/25 (admitted on 11/21) after being diagnosed with on COVID 11/12.  On chart review she did require supplemental oxygen while admitted due to acute hypoxia.  States that since she has been discharged she has not had any shortness of breath or difficulty breathing.  She was discharged with albuterol but has not required the use of it.  Overall, she is feeling better.  She works as a  and would like to get back to work as soon as possible.  She has scheduled an [facility] with [facility] for repeat COVID screening so that she may be able to get back to work when/if she tests negative.    While admitted, she was diagnosed with diabetes and started on Metformin. There was some confusion with the starting dose. However, did discuss with the patient to start with 500mg BID, she is agreeable. Also had long discussion regarding carbohydrate control and exercise. She has multiple family members with diabetes and states that she understands what type of diet is acceptable. She is also agreeable to referral for diabetic education. Discussed need for further lab work up and eye exam. States that she has recently had her yearly eye exam. Did discucss that she will now require diabetic eye exams. She is agreeable. All questions and concerns addressed.       No results displayed because visit has over 200 results.          Past Medical History:   Diagnosis Date    DVT (deep venous thrombosis)      Social History     Socioeconomic History    Marital status:      Spouse name: Not on file    Number of children: Not on file    Years of education: Not on file     Highest education level: Not on file   Occupational History    Not on file   Social Needs    Financial resource strain: Not on file    Food insecurity     Worry: Not on file     Inability: Not on file    Transportation needs     Medical: Not on file     Non-medical: Not on file   Tobacco Use    Smoking status: Never Smoker    Smokeless tobacco: Never Used   Substance and Sexual Activity    Alcohol use: No    Drug use: No    Sexual activity: Not on file   Lifestyle    Physical activity     Days per week: Not on file     Minutes per session: Not on file    Stress: Not on file   Relationships    Social connections     Talks on phone: Not on file     Gets together: Not on file     Attends Mandaen service: Not on file     Active member of club or organization: Not on file     Attends meetings of clubs or organizations: Not on file     Relationship status: Not on file   Other Topics Concern    Not on file   Social History Narrative    Not on file     Past Surgical History:   Procedure Laterality Date    BREAST BIOPSY Right 1998     SECTION      GALLBLADDER SURGERY  2011    KIDNEY STONE SURGERY  2006    OK DILATION/CURETTAGE,DIAGNOSTIC      D & C times 2    SHOULDER SURGERY Right 2010    Reconstruction Dr. Madrigal     History reviewed. No pertinent family history.    Review of patient's allergies indicates:   Allergen Reactions    Codeine Nausea And Vomiting       Current Outpatient Medications:     albuterol (PROVENTIL HFA) 90 mcg/actuation inhaler, Inhale 2 puffs into the lungs every 6 (six) hours as needed for Wheezing. Rescue, Disp: 18 g, Rfl: 0    aspirin (ECOTRIN) 81 MG EC tablet, Take 81 mg by mouth once daily., Disp: , Rfl:     benzonatate (TESSALON) 200 MG capsule, Take 1 capsule (200 mg total) by mouth 3 (three) times daily. for 10 days, Disp: 60 capsule, Rfl: 0    Bifidobacterium infantis (ALIGN) 10.5 mg (10 million cell) Chew, Take 1 tablet by mouth once daily.,  Disp: , Rfl:     blood sugar diagnostic Strp, 60 strips by Misc.(Non-Drug; Combo Route) route 2 (two) times a day., Disp: 60 strip, Rfl: 0    blood-glucose meter kit, Use as instructed, Disp: 1 each, Rfl: 0    cholecalciferol, vitamin D3, (VITAMIN D3) 2,000 unit Tab, Take 1 tablet by mouth once daily. , Disp: , Rfl:     cyanocobalamin-cobamamide (B12) 5,000-100 mcg Lozg, Place 1 lozenge under the tongue once daily. , Disp: , Rfl:     diphenhydrAMINE (BENADRYL) 25 mg capsule, Take 25 mg by mouth every 6 (six) hours as needed for Itching., Disp: , Rfl:     FLAXSEED OIL MISC, by Misc.(Non-Drug; Combo Route) route., Disp: , Rfl:     guaiFENesin (MUCINEX) 600 mg 12 hr tablet, Take 1,200 mg by mouth 2 (two) times daily., Disp: , Rfl:     magnesium 30 mg Tab, Take 30 mg by mouth once. , Disp: , Rfl:     metFORMIN (GLUCOPHAGE) 500 MG tablet, Take 1 tablet (500 mg total) by mouth 2 (two) times daily with meals., Disp: 180 tablet, Rfl: 3    multivitamin with minerals (HAIR,SKIN AND NAILS ORAL), Take 1 tablet by mouth once daily. , Disp: , Rfl:     mupirocin (BACTROBAN) 2 % ointment, Apply topically 2 (two) times daily. Apply to affected area, Disp: , Rfl:     vitamin E 400 UNIT capsule, Take 400 Units by mouth once daily., Disp: , Rfl:     zinc sulfate (ZINC-15 ORAL), Take 1 tablet by mouth once daily. , Disp: , Rfl:     Review of Systems   Constitutional: Negative for activity change, appetite change, chills, fatigue and unexpected weight change.   HENT: Negative for congestion, hearing loss, rhinorrhea and trouble swallowing.    Eyes: Negative for discharge and visual disturbance.   Respiratory: Negative for chest tightness, shortness of breath and wheezing.    Cardiovascular: Negative for chest pain and palpitations.   Gastrointestinal: Negative for blood in stool, constipation, diarrhea and vomiting.   Endocrine: Negative for polydipsia and polyuria.   Genitourinary: Negative for difficulty urinating,  dysuria, hematuria and menstrual problem.   Musculoskeletal: Negative for arthralgias, joint swelling and neck pain.   Neurological: Negative for headaches.   Psychiatric/Behavioral: Negative for confusion and dysphoric mood.          Objective:      There were no vitals filed for this visit.  Physical Exam  Constitutional:       Appearance: Normal appearance.   Eyes:      Extraocular Movements: Extraocular movements intact.   Pulmonary:      Effort: No respiratory distress.   Neurological:      Mental Status: She is alert.   Psychiatric:         Mood and Affect: Mood normal.         Thought Content: Thought content normal.         Judgment: Judgment normal.           Assessment:       1. Diabetes mellitus due to underlying condition with hyperglycemia, without long-term current use of insulin    2. Pneumonia due to COVID-19 virus    3. Overweight with body mass index (BMI) 25.0-29.9    4. Health care maintenance         Plan:       Diabetes mellitus due to underlying condition with hyperglycemia, without long-term current use of insulin  Comments:  Continue with Meformin 500mg BID  Labs ordered prior to next visit    Orders:  -     metFORMIN (GLUCOPHAGE) 500 MG tablet; Take 1 tablet (500 mg total) by mouth 2 (two) times daily with meals.  Dispense: 180 tablet; Refill: 3  -     Microalbumin/Creatinine Ratio, Urine; Future; Expected date: 11/30/2020  -     Lipid Panel; Future; Expected date: 11/30/2020  -     TSH; Future; Expected date: 11/30/2020  -     Comprehensive Metabolic Panel; Future; Expected date: 11/30/2020  -     Ambulatory referral/consult to Nutrition Services; Future; Expected date: 12/07/2020    Pneumonia due to COVID-19 virus  Comments:  Pt states that she is progressing well  All medications reviewed with the patient and updated in the chart    Overweight with body mass index (BMI) 25.0-29.9    Health care maintenance  -     TSH; Future; Expected date: 11/30/2020  -     CBC Auto Differential;  Future; Expected date: 11/30/2020  -     Comprehensive Metabolic Panel; Future; Expected date: 11/30/2020      Follow up in about 4 weeks (around 12/28/2020) for lab follow up, DM, Diabetic Check-up.        12/1/2020 Zabrina Sunshine M.D.

## 2020-11-30 NOTE — TELEPHONE ENCOUNTER
Says you prescribed her diabetic medication and a blood thinner and her insurance does not cover either one of them.  Says her co-pay for the diabetic medication is  $750.  She can not afford it.  Is there alternatives that you can prescribe for her?    EDVIN [place]

## 2020-12-01 PROBLEM — E66.3 OVERWEIGHT WITH BODY MASS INDEX (BMI) 25.0-29.9: Status: ACTIVE | Noted: 2020-12-01

## 2020-12-07 ENCOUNTER — PATIENT MESSAGE (OUTPATIENT)
Dept: FAMILY MEDICINE | Facility: CLINIC | Age: 60
End: 2020-12-07

## 2020-12-08 ENCOUNTER — TELEPHONE (OUTPATIENT)
Dept: FAMILY MEDICINE | Facility: CLINIC | Age: 60
End: 2020-12-08

## 2020-12-16 ENCOUNTER — TELEPHONE (OUTPATIENT)
Dept: FAMILY MEDICINE | Facility: CLINIC | Age: 60
End: 2020-12-16

## 2020-12-16 NOTE — TELEPHONE ENCOUNTER
Pt called to schedule a f/up appt with Dr. Sunshine.  She said that she sees orders for labs in [facility] but doesn't know if she needs to fast?  Can she just show up at imaging or does she need to call for an appointment?  I advised her that I would notify Dr. Sunshine's clinical staff and they will either send her a portal message or call her with the responses.

## 2020-12-21 ENCOUNTER — TELEPHONE (OUTPATIENT)
Dept: FAMILY MEDICINE | Facility: CLINIC | Age: 60
End: 2020-12-21

## 2020-12-21 DIAGNOSIS — R05.9 COUGH: Primary | ICD-10-CM

## 2020-12-21 NOTE — TELEPHONE ENCOUNTER
Patient had an appt today at 3:00 but cancelled due to a low grade fever. She states that she has some sinus problems and would like to know if she can have antibiotics called in to her Pharmacy. She states that she would prefer anything other than a Z-Roberto.

## 2020-12-22 RX ORDER — DOXYCYCLINE HYCLATE 100 MG/1
100 TABLET, DELAYED RELEASE ORAL EVERY 12 HOURS
Qty: 20 TABLET | Refills: 0 | Status: SHIPPED | OUTPATIENT
Start: 2020-12-22 | End: 2021-01-01

## 2020-12-28 ENCOUNTER — OFFICE VISIT (OUTPATIENT)
Dept: FAMILY MEDICINE | Facility: CLINIC | Age: 60
End: 2020-12-28
Payer: COMMERCIAL

## 2020-12-28 ENCOUNTER — LAB VISIT (OUTPATIENT)
Dept: LAB | Facility: HOSPITAL | Age: 60
End: 2020-12-28
Attending: FAMILY MEDICINE
Payer: COMMERCIAL

## 2020-12-28 VITALS
WEIGHT: 206.5 LBS | HEART RATE: 96 BPM | BODY MASS INDEX: 29.56 KG/M2 | OXYGEN SATURATION: 98 % | DIASTOLIC BLOOD PRESSURE: 86 MMHG | SYSTOLIC BLOOD PRESSURE: 136 MMHG | TEMPERATURE: 98 F | HEIGHT: 70 IN

## 2020-12-28 DIAGNOSIS — E78.2 MIXED HYPERLIPIDEMIA: ICD-10-CM

## 2020-12-28 DIAGNOSIS — E08.65 DIABETES MELLITUS DUE TO UNDERLYING CONDITION WITH HYPERGLYCEMIA, WITHOUT LONG-TERM CURRENT USE OF INSULIN: ICD-10-CM

## 2020-12-28 DIAGNOSIS — Z00.00 HEALTH CARE MAINTENANCE: ICD-10-CM

## 2020-12-28 DIAGNOSIS — E08.65 DIABETES MELLITUS DUE TO UNDERLYING CONDITION WITH HYPERGLYCEMIA, WITHOUT LONG-TERM CURRENT USE OF INSULIN: Primary | ICD-10-CM

## 2020-12-28 PROBLEM — Z86.16 HISTORY OF 2019 NOVEL CORONAVIRUS DISEASE (COVID-19): Status: ACTIVE | Noted: 2020-11-21

## 2020-12-28 LAB
ALBUMIN SERPL BCP-MCNC: 4.3 G/DL (ref 3.5–5.2)
ALBUMIN/CREAT UR: 17.1 UG/MG (ref 0–30)
ALP SERPL-CCNC: 73 U/L (ref 55–135)
ALT SERPL W/O P-5'-P-CCNC: 47 U/L (ref 10–44)
ANION GAP SERPL CALC-SCNC: 10 MMOL/L (ref 8–16)
AST SERPL-CCNC: 24 U/L (ref 10–40)
BASOPHILS # BLD AUTO: 0.05 K/UL (ref 0–0.2)
BASOPHILS NFR BLD: 0.8 % (ref 0–1.9)
BILIRUB SERPL-MCNC: 0.6 MG/DL (ref 0.1–1)
BUN SERPL-MCNC: 21 MG/DL (ref 6–20)
CALCIUM SERPL-MCNC: 9.4 MG/DL (ref 8.7–10.5)
CHLORIDE SERPL-SCNC: 105 MMOL/L (ref 95–110)
CHOLEST SERPL-MCNC: 210 MG/DL (ref 120–199)
CHOLEST/HDLC SERPL: 4.8 {RATIO} (ref 2–5)
CO2 SERPL-SCNC: 25 MMOL/L (ref 23–29)
CREAT SERPL-MCNC: 0.6 MG/DL (ref 0.5–1.4)
CREAT UR-MCNC: 346 MG/DL (ref 15–325)
DIFFERENTIAL METHOD: NORMAL
EOSINOPHIL # BLD AUTO: 0.1 K/UL (ref 0–0.5)
EOSINOPHIL NFR BLD: 1.4 % (ref 0–8)
ERYTHROCYTE [DISTWIDTH] IN BLOOD BY AUTOMATED COUNT: 12.6 % (ref 11.5–14.5)
EST. GFR  (AFRICAN AMERICAN): >60 ML/MIN/1.73 M^2
EST. GFR  (NON AFRICAN AMERICAN): >60 ML/MIN/1.73 M^2
GLUCOSE SERPL-MCNC: 197 MG/DL (ref 70–110)
HCT VFR BLD AUTO: 39.9 % (ref 37–48.5)
HDLC SERPL-MCNC: 44 MG/DL (ref 40–75)
HDLC SERPL: 21 % (ref 20–50)
HGB BLD-MCNC: 13.4 G/DL (ref 12–16)
IMM GRANULOCYTES # BLD AUTO: 0.03 K/UL (ref 0–0.04)
IMM GRANULOCYTES NFR BLD AUTO: 0.5 % (ref 0–0.5)
LDLC SERPL CALC-MCNC: 137.6 MG/DL (ref 63–159)
LYMPHOCYTES # BLD AUTO: 2.3 K/UL (ref 1–4.8)
LYMPHOCYTES NFR BLD: 34.6 % (ref 18–48)
MCH RBC QN AUTO: 30.9 PG (ref 27–31)
MCHC RBC AUTO-ENTMCNC: 33.6 G/DL (ref 32–36)
MCV RBC AUTO: 92 FL (ref 82–98)
MICROALBUMIN UR DL<=1MG/L-MCNC: 59.2 UG/ML
MONOCYTES # BLD AUTO: 0.5 K/UL (ref 0.3–1)
MONOCYTES NFR BLD: 6.9 % (ref 4–15)
NEUTROPHILS # BLD AUTO: 3.6 K/UL (ref 1.8–7.7)
NEUTROPHILS NFR BLD: 55.8 % (ref 38–73)
NONHDLC SERPL-MCNC: 166 MG/DL
NRBC BLD-RTO: 0 /100 WBC
PLATELET # BLD AUTO: 296 K/UL (ref 150–350)
PMV BLD AUTO: 9.9 FL (ref 9.2–12.9)
POTASSIUM SERPL-SCNC: 4.1 MMOL/L (ref 3.5–5.1)
PROT SERPL-MCNC: 6.9 G/DL (ref 6–8.4)
RBC # BLD AUTO: 4.33 M/UL (ref 4–5.4)
SODIUM SERPL-SCNC: 140 MMOL/L (ref 136–145)
TRIGL SERPL-MCNC: 142 MG/DL (ref 30–150)
TSH SERPL DL<=0.005 MIU/L-ACNC: 2.39 UIU/ML (ref 0.34–5.6)
WBC # BLD AUTO: 6.51 K/UL (ref 3.9–12.7)

## 2020-12-28 PROCEDURE — 1126F AMNT PAIN NOTED NONE PRSNT: CPT | Mod: S$GLB,,, | Performed by: FAMILY MEDICINE

## 2020-12-28 PROCEDURE — 99214 OFFICE O/P EST MOD 30 MIN: CPT | Mod: S$GLB,,, | Performed by: FAMILY MEDICINE

## 2020-12-28 PROCEDURE — 1126F PR PAIN SEVERITY QUANTIFIED, NO PAIN PRESENT: ICD-10-PCS | Mod: S$GLB,,, | Performed by: FAMILY MEDICINE

## 2020-12-28 PROCEDURE — 85025 COMPLETE CBC W/AUTO DIFF WBC: CPT

## 2020-12-28 PROCEDURE — 80053 COMPREHEN METABOLIC PANEL: CPT

## 2020-12-28 PROCEDURE — 3008F BODY MASS INDEX DOCD: CPT | Mod: S$GLB,,, | Performed by: FAMILY MEDICINE

## 2020-12-28 PROCEDURE — 36415 COLL VENOUS BLD VENIPUNCTURE: CPT

## 2020-12-28 PROCEDURE — 83036 HEMOGLOBIN GLYCOSYLATED A1C: CPT

## 2020-12-28 PROCEDURE — 82043 UR ALBUMIN QUANTITATIVE: CPT

## 2020-12-28 PROCEDURE — 3008F PR BODY MASS INDEX (BMI) DOCUMENTED: ICD-10-PCS | Mod: S$GLB,,, | Performed by: FAMILY MEDICINE

## 2020-12-28 PROCEDURE — 99214 PR OFFICE/OUTPT VISIT, EST, LEVL IV, 30-39 MIN: ICD-10-PCS | Mod: S$GLB,,, | Performed by: FAMILY MEDICINE

## 2020-12-28 PROCEDURE — 80061 LIPID PANEL: CPT

## 2020-12-28 PROCEDURE — 82570 ASSAY OF URINE CREATININE: CPT

## 2020-12-28 PROCEDURE — 84443 ASSAY THYROID STIM HORMONE: CPT

## 2020-12-28 RX ORDER — METFORMIN HYDROCHLORIDE 500 MG/1
1500 TABLET ORAL 2 TIMES DAILY WITH MEALS
Qty: 540 TABLET | Refills: 3 | Status: SHIPPED | OUTPATIENT
Start: 2020-12-28 | End: 2021-07-27 | Stop reason: SDUPTHER

## 2020-12-28 RX ORDER — ROSUVASTATIN CALCIUM 20 MG/1
20 TABLET, COATED ORAL DAILY
Qty: 90 TABLET | Refills: 2 | Status: SHIPPED | OUTPATIENT
Start: 2020-12-28 | End: 2021-07-27 | Stop reason: SDUPTHER

## 2020-12-28 NOTE — PROGRESS NOTES
SUBJECTIVE:    Patient ID: Savanna West is a 60 y.o. female.    Chief Complaint: Diabetes (1 month f/u)    Savanna presents today for follow-up.  She is status post infection with COVID.  States that she still has some fatigue but overall doing better.  Reviewed most recent labs with patient today.  A1c pending.  States that she has been compliant with her metformin.  Monitors her blood sugars daily which have been ranging from 150s to 250s.  She is agreeable with increased of metformin.  Has appointment with diabetic educator tomorrow.  Cholesterol is also elevated.  She is agreeable to start Crestor today.  Blood pressure mildly elevated.  On recheck, blood pressure improved.  She will obtain a blood pressure cuff and monitor blood pressure readings throughout the next 3 months. States that she is working to lose about 10 lb with next 3 months.  Discussed need to start blood pressure medication if her blood pressure continues to be elevated.  Agreeable for follow-up in 3 months with blood pressure check and repeat labs.  All her questions and concerns addressed today.      Lab Visit on 12/28/2020   Component Date Value Ref Range Status    Cholesterol 12/28/2020 210* 120 - 199 mg/dL Final    Triglycerides 12/28/2020 142  30 - 150 mg/dL Final    HDL 12/28/2020 44  40 - 75 mg/dL Final    LDL Cholesterol 12/28/2020 137.6  63.0 - 159.0 mg/dL Final    HDL/Cholesterol Ratio 12/28/2020 21.0  20.0 - 50.0 % Final    Total Cholesterol/HDL Ratio 12/28/2020 4.8  2.0 - 5.0 Final    Non-HDL Cholesterol 12/28/2020 166  mg/dL Final    TSH 12/28/2020 2.390  0.340 - 5.600 uIU/mL Final    WBC 12/28/2020 6.51  3.90 - 12.70 K/uL Final    RBC 12/28/2020 4.33  4.00 - 5.40 M/uL Final    Hemoglobin 12/28/2020 13.4  12.0 - 16.0 g/dL Final    Hematocrit 12/28/2020 39.9  37.0 - 48.5 % Final    MCV 12/28/2020 92  82 - 98 fL Final    MCH 12/28/2020 30.9  27.0 - 31.0 pg Final    MCHC 12/28/2020 33.6  32.0 - 36.0 g/dL Final     RDW 12/28/2020 12.6  11.5 - 14.5 % Final    Platelets 12/28/2020 296  150 - 350 K/uL Final    MPV 12/28/2020 9.9  9.2 - 12.9 fL Final    Immature Granulocytes 12/28/2020 0.5  0.0 - 0.5 % Final    Gran # (ANC) 12/28/2020 3.6  1.8 - 7.7 K/uL Final    Immature Grans (Abs) 12/28/2020 0.03  0.00 - 0.04 K/uL Final    Lymph # 12/28/2020 2.3  1.0 - 4.8 K/uL Final    Mono # 12/28/2020 0.5  0.3 - 1.0 K/uL Final    Eos # 12/28/2020 0.1  0.0 - 0.5 K/uL Final    Baso # 12/28/2020 0.05  0.00 - 0.20 K/uL Final    nRBC 12/28/2020 0  0 /100 WBC Final    Gran % 12/28/2020 55.8  38.0 - 73.0 % Final    Lymph % 12/28/2020 34.6  18.0 - 48.0 % Final    Mono % 12/28/2020 6.9  4.0 - 15.0 % Final    Eosinophil % 12/28/2020 1.4  0.0 - 8.0 % Final    Basophil % 12/28/2020 0.8  0.0 - 1.9 % Final    Differential Method 12/28/2020 Automated   Final    Sodium 12/28/2020 140  136 - 145 mmol/L Final    Potassium 12/28/2020 4.1  3.5 - 5.1 mmol/L Final    Chloride 12/28/2020 105  95 - 110 mmol/L Final    CO2 12/28/2020 25  23 - 29 mmol/L Final    Glucose 12/28/2020 197* 70 - 110 mg/dL Final    BUN 12/28/2020 21* 6 - 20 mg/dL Final    Creatinine 12/28/2020 0.6  0.5 - 1.4 mg/dL Final    Calcium 12/28/2020 9.4  8.7 - 10.5 mg/dL Final    Total Protein 12/28/2020 6.9  6.0 - 8.4 g/dL Final    Albumin 12/28/2020 4.3  3.5 - 5.2 g/dL Final    Total Bilirubin 12/28/2020 0.6  0.1 - 1.0 mg/dL Final    Alkaline Phosphatase 12/28/2020 73  55 - 135 U/L Final    AST 12/28/2020 24  10 - 40 U/L Final    ALT 12/28/2020 47* 10 - 44 U/L Final    Anion Gap 12/28/2020 10  8 - 16 mmol/L Final    eGFR if African American 12/28/2020 >60.0  >60 mL/min/1.73 m^2 Final    eGFR if non African American 12/28/2020 >60.0  >60 mL/min/1.73 m^2 Final   Lab Visit on 12/28/2020   Component Date Value Ref Range Status    Microalbumin, Urine 12/28/2020 59.2  ug/mL Final    Creatinine, Urine 12/28/2020 346.0* 15.0 - 325.0 mg/dL Final    Microalb/Creat Ratio  2020 17.1  0.0 - 30.0 ug/mg Final   No results displayed because visit has over 200 results.          Past Medical History:   Diagnosis Date    DVT (deep venous thrombosis)      Social History     Socioeconomic History    Marital status:      Spouse name: Not on file    Number of children: Not on file    Years of education: Not on file    Highest education level: Not on file   Occupational History    Not on file   Social Needs    Financial resource strain: Not on file    Food insecurity     Worry: Not on file     Inability: Not on file    Transportation needs     Medical: Not on file     Non-medical: Not on file   Tobacco Use    Smoking status: Never Smoker    Smokeless tobacco: Never Used   Substance and Sexual Activity    Alcohol use: No     Frequency: Monthly or less     Drinks per session: 1 or 2     Binge frequency: Never    Drug use: No    Sexual activity: Not on file   Lifestyle    Physical activity     Days per week: 6 days     Minutes per session: 150+ min    Stress: Only a little   Relationships    Social connections     Talks on phone: More than three times a week     Gets together: More than three times a week     Attends [employer] service: Not on file     Active member of club or organization: No     Attends meetings of clubs or organizations: Never     Relationship status: Patient refused   Other Topics Concern    Not on file   Social History Narrative    Not on file     Past Surgical History:   Procedure Laterality Date    BREAST BIOPSY Right 1998     SECTION      GALLBLADDER SURGERY  2011    KIDNEY STONE SURGERY  2006    SC DILATION/CURETTAGE,DIAGNOSTIC      D & C times 2    SHOULDER SURGERY Right 2010    Reconstruction Dr. Madrigal     History reviewed. No pertinent family history.    Review of patient's allergies indicates:   Allergen Reactions    Codeine Nausea And Vomiting       Current Outpatient Medications:     aspirin (ECOTRIN) 81 MG EC  tablet, Take 81 mg by mouth once daily., Disp: , Rfl:     Bifidobacterium infantis (ALIGN) 10.5 mg (10 million cell) Chew, Take 1 tablet by mouth once daily., Disp: , Rfl:     blood sugar diagnostic Strp, 60 strips by Misc.(Non-Drug; Combo Route) route 2 (two) times a day., Disp: 60 strip, Rfl: 0    blood-glucose meter kit, Use as instructed, Disp: 1 each, Rfl: 0    cholecalciferol, vitamin D3, (VITAMIN D3) 2,000 unit Tab, Take 1 tablet by mouth once daily. , Disp: , Rfl:     cyanocobalamin-cobamamide (B12) 5,000-100 mcg Lozg, Place 1 lozenge under the tongue once daily. , Disp: , Rfl:     diphenhydrAMINE (BENADRYL) 25 mg capsule, Take 25 mg by mouth every 6 (six) hours as needed for Itching., Disp: , Rfl:     doxycycline (DORYX) 100 MG EC tablet, Take 1 tablet (100 mg total) by mouth every 12 (twelve) hours. for 10 days, Disp: 20 tablet, Rfl: 0    magnesium 30 mg Tab, Take 30 mg by mouth once. , Disp: , Rfl:     metFORMIN (GLUCOPHAGE) 500 MG tablet, Take 3 tablets (1,500 mg total) by mouth 2 (two) times daily with meals., Disp: 540 tablet, Rfl: 3    multivitamin with minerals (HAIR,SKIN AND NAILS ORAL), Take 1 tablet by mouth once daily. , Disp: , Rfl:     mupirocin (BACTROBAN) 2 % ointment, Apply topically 2 (two) times daily. Apply to affected area, Disp: , Rfl:     vitamin E 400 UNIT capsule, Take 400 Units by mouth once daily., Disp: , Rfl:     zinc sulfate (ZINC-15 ORAL), Take 1 tablet by mouth once daily. , Disp: , Rfl:     FLAXSEED OIL MISC, by Misc.(Non-Drug; Combo Route) route., Disp: , Rfl:     guaiFENesin (MUCINEX) 600 mg 12 hr tablet, Take 1,200 mg by mouth 2 (two) times daily., Disp: , Rfl:     rosuvastatin (CRESTOR) 20 MG tablet, Take 1 tablet (20 mg total) by mouth once daily., Disp: 90 tablet, Rfl: 2    Review of Systems   Constitutional: Negative for activity change, appetite change, chills, fatigue, fever and unexpected weight change.   HENT: Negative for congestion, hearing loss,  "rhinorrhea and trouble swallowing.    Eyes: Negative for discharge and visual disturbance.   Respiratory: Negative for chest tightness, shortness of breath, wheezing and stridor.    Cardiovascular: Negative for chest pain, palpitations and leg swelling.   Gastrointestinal: Negative for abdominal distention, abdominal pain, blood in stool, constipation, diarrhea and vomiting.   Endocrine: Negative for polydipsia and polyuria.   Genitourinary: Negative for difficulty urinating, dysuria, hematuria and menstrual problem.   Musculoskeletal: Negative for arthralgias, joint swelling and neck pain.   Neurological: Negative for weakness and headaches.   Psychiatric/Behavioral: Negative for confusion and dysphoric mood.          Objective:      Vitals:    12/28/20 1449 12/28/20 1522   BP: (!) 142/86 136/86   Pulse: 96    Temp: 98.2 °F (36.8 °C)    SpO2: 98%    Weight: 93.7 kg (206 lb 8 oz)    Height: 5' 10" (1.778 m)      Physical Exam  Constitutional:       General: She is not in acute distress.     Appearance: Normal appearance. She is not ill-appearing.   HENT:      Head: Normocephalic and atraumatic.      Right Ear: External ear normal.      Left Ear: External ear normal.   Eyes:      General: No scleral icterus.        Right eye: No discharge.         Left eye: No discharge.      Extraocular Movements: Extraocular movements intact.      Conjunctiva/sclera: Conjunctivae normal.   Cardiovascular:      Rate and Rhythm: Normal rate and regular rhythm.      Pulses: Normal pulses.      Heart sounds: Normal heart sounds. No murmur.   Pulmonary:      Effort: Pulmonary effort is normal. No respiratory distress.      Breath sounds: Normal breath sounds. No wheezing.   Abdominal:      General: Bowel sounds are normal. There is no distension.      Palpations: Abdomen is soft.      Tenderness: There is no abdominal tenderness. There is no guarding or rebound.   Musculoskeletal: Normal range of motion.   Skin:     General: Skin is " warm and dry.   Neurological:      General: No focal deficit present.      Mental Status: She is alert and oriented to person, place, and time.      Motor: No tremor.   Psychiatric:         Mood and Affect: Mood normal.         Speech: Speech normal.         Behavior: Behavior normal.         Thought Content: Thought content normal.         Judgment: Judgment normal.           Assessment:       1. Diabetes mellitus due to underlying condition with hyperglycemia, without long-term current use of insulin    2. Mixed hyperlipidemia         Plan:       Diabetes mellitus due to underlying condition with hyperglycemia, without long-term current use of insulin  Comments:  A1c pending  increase metformin from 1000mg daily to 1500mg daily  follow up 3 months    Orders:  -     metFORMIN (GLUCOPHAGE) 500 MG tablet; Take 3 tablets (1,500 mg total) by mouth 2 (two) times daily with meals.  Dispense: 540 tablet; Refill: 3  -     rosuvastatin (CRESTOR) 20 MG tablet; Take 1 tablet (20 mg total) by mouth once daily.  Dispense: 90 tablet; Refill: 2  -     Comprehensive Metabolic Panel; Future; Expected date: 02/28/2021  -     Lipid Panel; Future; Expected date: 02/28/2021  -     Hemoglobin A1C; Future; Expected date: 02/28/2021    Mixed hyperlipidemia  Comments:  ASCVD 8.5%  Start Crestor 20mg  Orders:  -     rosuvastatin (CRESTOR) 20 MG tablet; Take 1 tablet (20 mg total) by mouth once daily.  Dispense: 90 tablet; Refill: 2  -     Comprehensive Metabolic Panel; Future; Expected date: 02/28/2021  -     Lipid Panel; Future; Expected date: 02/28/2021      Follow up in about 3 months (around 3/28/2021) for Diabetic Check-up, lab follow up.        12/28/2020 Zabrina Sunshine M.D.

## 2020-12-29 LAB
ESTIMATED AVG GLUCOSE: 220 MG/DL (ref 68–131)
HBA1C MFR BLD HPLC: 9.3 % (ref 4.5–6.2)

## 2020-12-31 ENCOUNTER — PATIENT MESSAGE (OUTPATIENT)
Dept: FAMILY MEDICINE | Facility: CLINIC | Age: 60
End: 2020-12-31

## 2021-01-07 ENCOUNTER — NUTRITION (OUTPATIENT)
Dept: NUTRITION | Facility: HOSPITAL | Age: 61
End: 2021-01-07
Payer: COMMERCIAL

## 2021-01-07 DIAGNOSIS — E66.3 OVERWEIGHT WITH BODY MASS INDEX (BMI) 25.0-29.9: ICD-10-CM

## 2021-01-07 DIAGNOSIS — E78.2 MIXED HYPERLIPIDEMIA: Primary | ICD-10-CM

## 2021-01-07 DIAGNOSIS — E08.65 DIABETES MELLITUS DUE TO UNDERLYING CONDITION WITH HYPERGLYCEMIA, WITHOUT LONG-TERM CURRENT USE OF INSULIN: ICD-10-CM

## 2021-01-07 DIAGNOSIS — E66.3 OVERWEIGHT: ICD-10-CM

## 2021-01-07 PROCEDURE — 97802 MEDICAL NUTRITION INDIV IN: CPT

## 2021-03-19 ENCOUNTER — HOSPITAL ENCOUNTER (EMERGENCY)
Facility: HOSPITAL | Age: 61
Discharge: HOME | End: 2021-03-19
Attending: EMERGENCY MEDICINE
Payer: COMMERCIAL

## 2021-03-19 VITALS
HEART RATE: 73 BPM | WEIGHT: 200 LBS | TEMPERATURE: 98 F | BODY MASS INDEX: 28.63 KG/M2 | RESPIRATION RATE: 17 BRPM | SYSTOLIC BLOOD PRESSURE: 144 MMHG | OXYGEN SATURATION: 97 % | HEIGHT: 70 IN | DIASTOLIC BLOOD PRESSURE: 79 MMHG

## 2021-03-19 DIAGNOSIS — R30.0 DYSURIA: ICD-10-CM

## 2021-03-19 DIAGNOSIS — N12 PYELONEPHRITIS: Primary | ICD-10-CM

## 2021-03-19 DIAGNOSIS — N20.0 RENAL STONE: ICD-10-CM

## 2021-03-19 LAB
ALBUMIN SERPL BCP-MCNC: 4.3 G/DL (ref 3.5–5.2)
ALP SERPL-CCNC: 53 U/L (ref 55–135)
ALT SERPL W/O P-5'-P-CCNC: 36 U/L (ref 10–44)
ANION GAP SERPL CALC-SCNC: 9 MMOL/L (ref 8–16)
AST SERPL-CCNC: 25 U/L (ref 10–40)
BACTERIA #/AREA URNS HPF: ABNORMAL /HPF
BASOPHILS # BLD AUTO: 0.02 K/UL (ref 0–0.2)
BASOPHILS NFR BLD: 0.3 % (ref 0–1.9)
BILIRUB SERPL-MCNC: 0.8 MG/DL (ref 0.1–1)
BILIRUB UR QL STRIP: NEGATIVE
BUN SERPL-MCNC: 21 MG/DL (ref 6–20)
CALCIUM SERPL-MCNC: 8.9 MG/DL (ref 8.7–10.5)
CHLORIDE SERPL-SCNC: 105 MMOL/L (ref 95–110)
CLARITY UR: ABNORMAL
CO2 SERPL-SCNC: 26 MMOL/L (ref 23–29)
COLOR UR: ABNORMAL
CREAT SERPL-MCNC: 0.5 MG/DL (ref 0.5–1.4)
DIFFERENTIAL METHOD: ABNORMAL
EOSINOPHIL # BLD AUTO: 0.1 K/UL (ref 0–0.5)
EOSINOPHIL NFR BLD: 0.8 % (ref 0–8)
ERYTHROCYTE [DISTWIDTH] IN BLOOD BY AUTOMATED COUNT: 12.1 % (ref 11.5–14.5)
EST. GFR  (AFRICAN AMERICAN): >60 ML/MIN/1.73 M^2
EST. GFR  (NON AFRICAN AMERICAN): >60 ML/MIN/1.73 M^2
GLUCOSE SERPL-MCNC: 135 MG/DL (ref 70–110)
GLUCOSE UR QL STRIP: ABNORMAL
HCT VFR BLD AUTO: 36.6 % (ref 37–48.5)
HGB BLD-MCNC: 12.4 G/DL (ref 12–16)
HGB UR QL STRIP: ABNORMAL
HYALINE CASTS #/AREA URNS LPF: 5 /LPF
IMM GRANULOCYTES # BLD AUTO: 0.02 K/UL (ref 0–0.04)
IMM GRANULOCYTES NFR BLD AUTO: 0.3 % (ref 0–0.5)
KETONES UR QL STRIP: NEGATIVE
LEUKOCYTE ESTERASE UR QL STRIP: NEGATIVE
LYMPHOCYTES # BLD AUTO: 2.2 K/UL (ref 1–4.8)
LYMPHOCYTES NFR BLD: 36.2 % (ref 18–48)
MCH RBC QN AUTO: 30.7 PG (ref 27–31)
MCHC RBC AUTO-ENTMCNC: 33.9 G/DL (ref 32–36)
MCV RBC AUTO: 91 FL (ref 82–98)
MICROSCOPIC COMMENT: ABNORMAL
MONOCYTES # BLD AUTO: 0.4 K/UL (ref 0.3–1)
MONOCYTES NFR BLD: 6.8 % (ref 4–15)
NEUTROPHILS # BLD AUTO: 3.4 K/UL (ref 1.8–7.7)
NEUTROPHILS NFR BLD: 55.6 % (ref 38–73)
NITRITE UR QL STRIP: POSITIVE
NRBC BLD-RTO: 0 /100 WBC
PH UR STRIP: 6 [PH] (ref 5–8)
PLATELET # BLD AUTO: 260 K/UL (ref 150–350)
PMV BLD AUTO: 10.2 FL (ref 9.2–12.9)
POTASSIUM SERPL-SCNC: 3.8 MMOL/L (ref 3.5–5.1)
PROT SERPL-MCNC: 6.8 G/DL (ref 6–8.4)
PROT UR QL STRIP: ABNORMAL
RBC # BLD AUTO: 4.04 M/UL (ref 4–5.4)
RBC #/AREA URNS HPF: 1 /HPF (ref 0–4)
SODIUM SERPL-SCNC: 140 MMOL/L (ref 136–145)
SP GR UR STRIP: >1.03 (ref 1–1.03)
SQUAMOUS #/AREA URNS HPF: 4 /HPF
URN SPEC COLLECT METH UR: ABNORMAL
UROBILINOGEN UR STRIP-ACNC: ABNORMAL EU/DL
WBC # BLD AUTO: 6.16 K/UL (ref 3.9–12.7)
WBC #/AREA URNS HPF: 7 /HPF (ref 0–5)

## 2021-03-19 PROCEDURE — 85025 COMPLETE CBC W/AUTO DIFF WBC: CPT | Performed by: EMERGENCY MEDICINE

## 2021-03-19 PROCEDURE — 63600175 PHARM REV CODE 636 W HCPCS: Performed by: NURSE PRACTITIONER

## 2021-03-19 PROCEDURE — 96375 TX/PRO/DX INJ NEW DRUG ADDON: CPT

## 2021-03-19 PROCEDURE — 80053 COMPREHEN METABOLIC PANEL: CPT | Performed by: EMERGENCY MEDICINE

## 2021-03-19 PROCEDURE — 36415 COLL VENOUS BLD VENIPUNCTURE: CPT | Performed by: EMERGENCY MEDICINE

## 2021-03-19 PROCEDURE — 99284 EMERGENCY DEPT VISIT MOD MDM: CPT | Mod: 25

## 2021-03-19 PROCEDURE — 96361 HYDRATE IV INFUSION ADD-ON: CPT

## 2021-03-19 PROCEDURE — 25000003 PHARM REV CODE 250: Performed by: NURSE PRACTITIONER

## 2021-03-19 PROCEDURE — 81001 URINALYSIS AUTO W/SCOPE: CPT | Performed by: EMERGENCY MEDICINE

## 2021-03-19 PROCEDURE — 96374 THER/PROPH/DIAG INJ IV PUSH: CPT

## 2021-03-19 RX ORDER — MORPHINE SULFATE 2 MG/ML
2 INJECTION, SOLUTION INTRAMUSCULAR; INTRAVENOUS
Status: COMPLETED | OUTPATIENT
Start: 2021-03-19 | End: 2021-03-19

## 2021-03-19 RX ORDER — CEFUROXIME AXETIL 500 MG/1
500 TABLET ORAL EVERY 12 HOURS
Qty: 14 TABLET | Refills: 0 | Status: SHIPPED | OUTPATIENT
Start: 2021-03-19 | End: 2021-03-26

## 2021-03-19 RX ORDER — FLUCONAZOLE 150 MG/1
150 TABLET ORAL DAILY
Qty: 1 TABLET | Refills: 0 | Status: SHIPPED | OUTPATIENT
Start: 2021-03-19 | End: 2021-03-20

## 2021-03-19 RX ORDER — TAMSULOSIN HYDROCHLORIDE 0.4 MG/1
0.4 CAPSULE ORAL DAILY
Qty: 10 CAPSULE | Refills: 0 | Status: SHIPPED | OUTPATIENT
Start: 2021-03-19 | End: 2021-04-28 | Stop reason: ALTCHOICE

## 2021-03-19 RX ORDER — CEFTRIAXONE 1 G/1
1 INJECTION, POWDER, FOR SOLUTION INTRAMUSCULAR; INTRAVENOUS
Status: COMPLETED | OUTPATIENT
Start: 2021-03-19 | End: 2021-03-19

## 2021-03-19 RX ORDER — ONDANSETRON 2 MG/ML
4 INJECTION INTRAMUSCULAR; INTRAVENOUS
Status: COMPLETED | OUTPATIENT
Start: 2021-03-19 | End: 2021-03-19

## 2021-03-19 RX ORDER — ONDANSETRON 4 MG/1
4 TABLET, FILM COATED ORAL EVERY 6 HOURS
Qty: 12 TABLET | Refills: 0 | Status: SHIPPED | OUTPATIENT
Start: 2021-03-19 | End: 2021-04-19 | Stop reason: SDUPTHER

## 2021-03-19 RX ORDER — IBUPROFEN 400 MG/1
400 TABLET ORAL EVERY 6 HOURS PRN
Qty: 20 TABLET | Refills: 0 | Status: SHIPPED | OUTPATIENT
Start: 2021-03-19

## 2021-03-19 RX ADMIN — ONDANSETRON 4 MG: 2 INJECTION INTRAMUSCULAR; INTRAVENOUS at 08:03

## 2021-03-19 RX ADMIN — MORPHINE SULFATE 2 MG: 2 INJECTION, SOLUTION INTRAMUSCULAR; INTRAVENOUS at 08:03

## 2021-03-19 RX ADMIN — CEFTRIAXONE SODIUM 1 G: 1 INJECTION, POWDER, FOR SOLUTION INTRAMUSCULAR; INTRAVENOUS at 09:03

## 2021-03-19 RX ADMIN — SODIUM CHLORIDE 1000 ML: 0.9 INJECTION, SOLUTION INTRAVENOUS at 09:03

## 2021-04-19 ENCOUNTER — OFFICE VISIT (OUTPATIENT)
Dept: FAMILY MEDICINE | Facility: CLINIC | Age: 61
End: 2021-04-19
Payer: COMMERCIAL

## 2021-04-19 VITALS
OXYGEN SATURATION: 97 % | HEIGHT: 70 IN | BODY MASS INDEX: 28.72 KG/M2 | SYSTOLIC BLOOD PRESSURE: 124 MMHG | RESPIRATION RATE: 18 BRPM | HEART RATE: 88 BPM | TEMPERATURE: 98 F | WEIGHT: 200.63 LBS | DIASTOLIC BLOOD PRESSURE: 80 MMHG

## 2021-04-19 DIAGNOSIS — N39.0 RECURRENT UTI: ICD-10-CM

## 2021-04-19 DIAGNOSIS — N20.0 LEFT NEPHROLITHIASIS: ICD-10-CM

## 2021-04-19 DIAGNOSIS — B37.31 VAGINAL YEAST INFECTION: ICD-10-CM

## 2021-04-19 DIAGNOSIS — N30.01 ACUTE CYSTITIS WITH HEMATURIA: Primary | ICD-10-CM

## 2021-04-19 DIAGNOSIS — R11.0 NAUSEA: ICD-10-CM

## 2021-04-19 DIAGNOSIS — R30.0 DYSURIA: ICD-10-CM

## 2021-04-19 LAB
BILIRUB SERPL-MCNC: ABNORMAL MG/DL
BLOOD URINE, POC: + 250
CLARITY, POC UA: ABNORMAL
COLOR, POC UA: ABNORMAL
GLUCOSE UR QL STRIP: NORMAL
KETONES UR QL STRIP: ABNORMAL
LEUKOCYTE ESTERASE URINE, POC: ABNORMAL
NITRITE, POC UA: ABNORMAL
PH, POC UA: 5
PROTEIN, POC: ABNORMAL
SPECIFIC GRAVITY, POC UA: 1.03
UROBILINOGEN, POC UA: 1

## 2021-04-19 PROCEDURE — 3008F PR BODY MASS INDEX (BMI) DOCUMENTED: ICD-10-PCS | Mod: CPTII,S$GLB,, | Performed by: PHYSICIAN ASSISTANT

## 2021-04-19 PROCEDURE — 87186 SC STD MICRODIL/AGAR DIL: CPT | Performed by: PHYSICIAN ASSISTANT

## 2021-04-19 PROCEDURE — 87088 URINE BACTERIA CULTURE: CPT | Performed by: PHYSICIAN ASSISTANT

## 2021-04-19 PROCEDURE — 87086 URINE CULTURE/COLONY COUNT: CPT | Performed by: PHYSICIAN ASSISTANT

## 2021-04-19 PROCEDURE — 99999 PR PBB SHADOW E&M-EST. PATIENT-LVL V: ICD-10-PCS | Mod: PBBFAC,,, | Performed by: PHYSICIAN ASSISTANT

## 2021-04-19 PROCEDURE — 81002 POCT URINE DIPSTICK WITHOUT MICROSCOPE: ICD-10-PCS | Mod: S$GLB,,, | Performed by: PHYSICIAN ASSISTANT

## 2021-04-19 PROCEDURE — 1125F AMNT PAIN NOTED PAIN PRSNT: CPT | Mod: S$GLB,,, | Performed by: PHYSICIAN ASSISTANT

## 2021-04-19 PROCEDURE — 99204 PR OFFICE/OUTPT VISIT, NEW, LEVL IV, 45-59 MIN: ICD-10-PCS | Mod: 25,S$GLB,, | Performed by: PHYSICIAN ASSISTANT

## 2021-04-19 PROCEDURE — 87077 CULTURE AEROBIC IDENTIFY: CPT | Performed by: PHYSICIAN ASSISTANT

## 2021-04-19 PROCEDURE — 99204 OFFICE O/P NEW MOD 45 MIN: CPT | Mod: 25,S$GLB,, | Performed by: PHYSICIAN ASSISTANT

## 2021-04-19 PROCEDURE — 81002 URINALYSIS NONAUTO W/O SCOPE: CPT | Mod: S$GLB,,, | Performed by: PHYSICIAN ASSISTANT

## 2021-04-19 PROCEDURE — 3008F BODY MASS INDEX DOCD: CPT | Mod: CPTII,S$GLB,, | Performed by: PHYSICIAN ASSISTANT

## 2021-04-19 PROCEDURE — 99999 PR PBB SHADOW E&M-EST. PATIENT-LVL V: CPT | Mod: PBBFAC,,, | Performed by: PHYSICIAN ASSISTANT

## 2021-04-19 PROCEDURE — 1125F PR PAIN SEVERITY QUANTIFIED, PAIN PRESENT: ICD-10-PCS | Mod: S$GLB,,, | Performed by: PHYSICIAN ASSISTANT

## 2021-04-19 RX ORDER — PHENAZOPYRIDINE HYDROCHLORIDE 100 MG/1
100 TABLET, FILM COATED ORAL
Qty: 15 TABLET | Refills: 0 | Status: SHIPPED | OUTPATIENT
Start: 2021-04-19 | End: 2021-04-28 | Stop reason: ALTCHOICE

## 2021-04-19 RX ORDER — CIPROFLOXACIN 500 MG/1
500 TABLET ORAL EVERY 12 HOURS
Qty: 10 TABLET | Refills: 0 | Status: SHIPPED | OUTPATIENT
Start: 2021-04-19 | End: 2021-04-19

## 2021-04-19 RX ORDER — FLUCONAZOLE 150 MG/1
TABLET ORAL
Qty: 2 TABLET | Refills: 0 | Status: SHIPPED | OUTPATIENT
Start: 2021-04-19

## 2021-04-19 RX ORDER — ONDANSETRON 4 MG/1
4 TABLET, FILM COATED ORAL EVERY 6 HOURS PRN
Qty: 12 TABLET | Refills: 0 | OUTPATIENT
Start: 2021-04-19 | End: 2022-08-13

## 2021-04-19 RX ORDER — AMOXICILLIN AND CLAVULANATE POTASSIUM 875; 125 MG/1; MG/1
1 TABLET, FILM COATED ORAL 2 TIMES DAILY
Qty: 14 TABLET | Refills: 0 | Status: SHIPPED | OUTPATIENT
Start: 2021-04-19 | End: 2021-04-26

## 2021-04-22 LAB — BACTERIA UR CULT: ABNORMAL

## 2021-04-23 ENCOUNTER — TELEPHONE (OUTPATIENT)
Dept: FAMILY MEDICINE | Facility: CLINIC | Age: 61
End: 2021-04-23

## 2021-04-28 ENCOUNTER — OFFICE VISIT (OUTPATIENT)
Dept: UROLOGY | Facility: CLINIC | Age: 61
End: 2021-04-28
Payer: COMMERCIAL

## 2021-04-28 VITALS
WEIGHT: 200.63 LBS | HEART RATE: 88 BPM | RESPIRATION RATE: 18 BRPM | SYSTOLIC BLOOD PRESSURE: 145 MMHG | DIASTOLIC BLOOD PRESSURE: 80 MMHG | BODY MASS INDEX: 28.09 KG/M2 | HEIGHT: 71 IN

## 2021-04-28 DIAGNOSIS — N20.0 LEFT NEPHROLITHIASIS: Primary | ICD-10-CM

## 2021-04-28 DIAGNOSIS — N30.01 ACUTE CYSTITIS WITH HEMATURIA: ICD-10-CM

## 2021-04-28 PROCEDURE — 3008F BODY MASS INDEX DOCD: CPT | Mod: CPTII,S$GLB,, | Performed by: STUDENT IN AN ORGANIZED HEALTH CARE EDUCATION/TRAINING PROGRAM

## 2021-04-28 PROCEDURE — 99203 OFFICE O/P NEW LOW 30 MIN: CPT | Mod: S$GLB,,, | Performed by: STUDENT IN AN ORGANIZED HEALTH CARE EDUCATION/TRAINING PROGRAM

## 2021-04-28 PROCEDURE — 99203 PR OFFICE/OUTPT VISIT, NEW, LEVL III, 30-44 MIN: ICD-10-PCS | Mod: S$GLB,,, | Performed by: STUDENT IN AN ORGANIZED HEALTH CARE EDUCATION/TRAINING PROGRAM

## 2021-04-28 PROCEDURE — 1126F AMNT PAIN NOTED NONE PRSNT: CPT | Mod: S$GLB,,, | Performed by: STUDENT IN AN ORGANIZED HEALTH CARE EDUCATION/TRAINING PROGRAM

## 2021-04-28 PROCEDURE — 3008F PR BODY MASS INDEX (BMI) DOCUMENTED: ICD-10-PCS | Mod: CPTII,S$GLB,, | Performed by: STUDENT IN AN ORGANIZED HEALTH CARE EDUCATION/TRAINING PROGRAM

## 2021-04-28 PROCEDURE — 1126F PR PAIN SEVERITY QUANTIFIED, NO PAIN PRESENT: ICD-10-PCS | Mod: S$GLB,,, | Performed by: STUDENT IN AN ORGANIZED HEALTH CARE EDUCATION/TRAINING PROGRAM

## 2021-04-28 PROCEDURE — 99999 PR PBB SHADOW E&M-EST. PATIENT-LVL IV: CPT | Mod: PBBFAC,,, | Performed by: STUDENT IN AN ORGANIZED HEALTH CARE EDUCATION/TRAINING PROGRAM

## 2021-04-28 PROCEDURE — 99999 PR PBB SHADOW E&M-EST. PATIENT-LVL IV: ICD-10-PCS | Mod: PBBFAC,,, | Performed by: STUDENT IN AN ORGANIZED HEALTH CARE EDUCATION/TRAINING PROGRAM

## 2021-04-29 ENCOUNTER — PATIENT MESSAGE (OUTPATIENT)
Dept: RESEARCH | Facility: HOSPITAL | Age: 61
End: 2021-04-29

## 2021-05-24 ENCOUNTER — LAB VISIT (OUTPATIENT)
Dept: LAB | Facility: HOSPITAL | Age: 61
End: 2021-05-24
Attending: PHYSICIAN ASSISTANT
Payer: COMMERCIAL

## 2021-05-24 ENCOUNTER — TELEPHONE (OUTPATIENT)
Dept: FAMILY MEDICINE | Facility: CLINIC | Age: 61
End: 2021-05-24

## 2021-05-24 DIAGNOSIS — N30.00 ACUTE CYSTITIS WITHOUT HEMATURIA: ICD-10-CM

## 2021-05-24 DIAGNOSIS — N30.00 ACUTE CYSTITIS WITHOUT HEMATURIA: Primary | ICD-10-CM

## 2021-05-24 LAB
BACTERIA #/AREA URNS HPF: ABNORMAL /HPF
BILIRUB UR QL STRIP: NEGATIVE
CLARITY UR: ABNORMAL
COLOR UR: ABNORMAL
GLUCOSE UR QL STRIP: NEGATIVE
HGB UR QL STRIP: ABNORMAL
HYALINE CASTS #/AREA URNS LPF: 0 /LPF
KETONES UR QL STRIP: ABNORMAL
LEUKOCYTE ESTERASE UR QL STRIP: ABNORMAL
MICROSCOPIC COMMENT: ABNORMAL
NITRITE UR QL STRIP: POSITIVE
PH UR STRIP: 6 [PH] (ref 5–8)
PROT UR QL STRIP: ABNORMAL
RBC #/AREA URNS HPF: 4 /HPF (ref 0–4)
SP GR UR STRIP: >=1.03 (ref 1–1.03)
SQUAMOUS #/AREA URNS HPF: 7 /HPF
URN SPEC COLLECT METH UR: ABNORMAL
UROBILINOGEN UR STRIP-ACNC: 1 EU/DL
WBC #/AREA URNS HPF: >100 /HPF (ref 0–5)

## 2021-05-24 PROCEDURE — 81000 URINALYSIS NONAUTO W/SCOPE: CPT | Performed by: PHYSICIAN ASSISTANT

## 2021-05-24 PROCEDURE — 87088 URINE BACTERIA CULTURE: CPT | Performed by: PHYSICIAN ASSISTANT

## 2021-05-24 PROCEDURE — 87086 URINE CULTURE/COLONY COUNT: CPT | Performed by: PHYSICIAN ASSISTANT

## 2021-05-24 PROCEDURE — 87077 CULTURE AEROBIC IDENTIFY: CPT | Performed by: PHYSICIAN ASSISTANT

## 2021-05-24 PROCEDURE — 87186 SC STD MICRODIL/AGAR DIL: CPT | Performed by: PHYSICIAN ASSISTANT

## 2021-05-24 RX ORDER — AMOXICILLIN AND CLAVULANATE POTASSIUM 875; 125 MG/1; MG/1
1 TABLET, FILM COATED ORAL 2 TIMES DAILY
Qty: 14 TABLET | Refills: 0 | Status: SHIPPED | OUTPATIENT
Start: 2021-05-24 | End: 2021-05-31

## 2021-05-24 RX ORDER — PHENAZOPYRIDINE HYDROCHLORIDE 100 MG/1
100 TABLET, FILM COATED ORAL 3 TIMES DAILY PRN
Qty: 30 TABLET | Refills: 0 | Status: SHIPPED | OUTPATIENT
Start: 2021-05-24 | End: 2021-06-03

## 2021-05-25 ENCOUNTER — TELEPHONE (OUTPATIENT)
Dept: FAMILY MEDICINE | Facility: CLINIC | Age: 61
End: 2021-05-25

## 2021-05-27 LAB — BACTERIA UR CULT: ABNORMAL

## 2021-07-27 ENCOUNTER — OFFICE VISIT (OUTPATIENT)
Dept: FAMILY MEDICINE | Facility: CLINIC | Age: 61
End: 2021-07-27
Payer: COMMERCIAL

## 2021-07-27 VITALS
WEIGHT: 201.19 LBS | DIASTOLIC BLOOD PRESSURE: 80 MMHG | HEIGHT: 71 IN | SYSTOLIC BLOOD PRESSURE: 118 MMHG | BODY MASS INDEX: 28.17 KG/M2 | HEART RATE: 72 BPM

## 2021-07-27 DIAGNOSIS — Z12.31 SCREENING MAMMOGRAM, ENCOUNTER FOR: Primary | ICD-10-CM

## 2021-07-27 DIAGNOSIS — Z12.11 COLON CANCER SCREENING: ICD-10-CM

## 2021-07-27 DIAGNOSIS — R59.1 LYMPHADENOPATHY OF HEAD AND NECK: ICD-10-CM

## 2021-07-27 DIAGNOSIS — E08.65 DIABETES MELLITUS DUE TO UNDERLYING CONDITION WITH HYPERGLYCEMIA, WITHOUT LONG-TERM CURRENT USE OF INSULIN: ICD-10-CM

## 2021-07-27 DIAGNOSIS — E78.2 MIXED HYPERLIPIDEMIA: ICD-10-CM

## 2021-07-27 DIAGNOSIS — N39.0 RECURRENT UTI: ICD-10-CM

## 2021-07-27 PROCEDURE — 99214 OFFICE O/P EST MOD 30 MIN: CPT | Mod: S$GLB,,, | Performed by: NURSE PRACTITIONER

## 2021-07-27 PROCEDURE — 3008F BODY MASS INDEX DOCD: CPT | Mod: S$GLB,,, | Performed by: NURSE PRACTITIONER

## 2021-07-27 PROCEDURE — 1160F PR REVIEW ALL MEDS BY PRESCRIBER/CLIN PHARMACIST DOCUMENTED: ICD-10-PCS | Mod: S$GLB,,, | Performed by: NURSE PRACTITIONER

## 2021-07-27 PROCEDURE — 1160F RVW MEDS BY RX/DR IN RCRD: CPT | Mod: S$GLB,,, | Performed by: NURSE PRACTITIONER

## 2021-07-27 PROCEDURE — 99214 PR OFFICE/OUTPT VISIT, EST, LEVL IV, 30-39 MIN: ICD-10-PCS | Mod: S$GLB,,, | Performed by: NURSE PRACTITIONER

## 2021-07-27 PROCEDURE — 3008F PR BODY MASS INDEX (BMI) DOCUMENTED: ICD-10-PCS | Mod: S$GLB,,, | Performed by: NURSE PRACTITIONER

## 2021-07-27 RX ORDER — METFORMIN HYDROCHLORIDE 500 MG/1
1000 TABLET ORAL 2 TIMES DAILY WITH MEALS
Qty: 360 TABLET | Refills: 3 | Status: SHIPPED | OUTPATIENT
Start: 2021-07-27 | End: 2023-04-13 | Stop reason: SDUPTHER

## 2021-07-27 RX ORDER — ROSUVASTATIN CALCIUM 20 MG/1
20 TABLET, COATED ORAL DAILY
Qty: 90 TABLET | Refills: 2 | Status: SHIPPED | OUTPATIENT
Start: 2021-07-27 | End: 2021-11-10 | Stop reason: SDUPTHER

## 2021-07-30 LAB
ALBUMIN SERPL-MCNC: 4.4 G/DL (ref 3.6–5.1)
ALBUMIN/CREAT UR: 9 MCG/MG CREAT
ALBUMIN/GLOB SERPL: 1.8 (CALC) (ref 1–2.5)
ALP SERPL-CCNC: 56 U/L (ref 37–153)
ALT SERPL-CCNC: 36 U/L (ref 6–29)
APPEARANCE UR: ABNORMAL
AST SERPL-CCNC: 24 U/L (ref 10–35)
BACTERIA #/AREA URNS HPF: ABNORMAL /HPF
BACTERIA UR CULT: ABNORMAL
BASOPHILS # BLD AUTO: 50 CELLS/UL (ref 0–200)
BASOPHILS NFR BLD AUTO: 0.8 %
BILIRUB SERPL-MCNC: 0.5 MG/DL (ref 0.2–1.2)
BUN SERPL-MCNC: 15 MG/DL (ref 7–25)
BUN/CREAT SERPL: ABNORMAL (CALC) (ref 6–22)
CALCIUM SERPL-MCNC: 9.4 MG/DL (ref 8.6–10.4)
CHLORIDE SERPL-SCNC: 102 MMOL/L (ref 98–110)
CHOLEST SERPL-MCNC: 153 MG/DL
CHOLEST/HDLC SERPL: 2.9 (CALC)
CO2 SERPL-SCNC: 28 MMOL/L (ref 20–32)
COLOR UR: ABNORMAL
CREAT SERPL-MCNC: 0.76 MG/DL (ref 0.5–0.99)
CREAT UR-MCNC: 138 MG/DL (ref 20–275)
EOSINOPHIL # BLD AUTO: 81 CELLS/UL (ref 15–500)
EOSINOPHIL NFR BLD AUTO: 1.3 %
ERYTHROCYTE [DISTWIDTH] IN BLOOD BY AUTOMATED COUNT: 11.8 % (ref 11–15)
GLOBULIN SER CALC-MCNC: 2.5 G/DL (CALC) (ref 1.9–3.7)
GLUCOSE SERPL-MCNC: 182 MG/DL (ref 65–99)
HCT VFR BLD AUTO: 41 % (ref 35–45)
HDLC SERPL-MCNC: 52 MG/DL
HGB BLD-MCNC: 13.6 G/DL (ref 11.7–15.5)
HYALINE CASTS #/AREA URNS LPF: ABNORMAL /LPF
LDLC SERPL CALC-MCNC: 81 MG/DL (CALC)
LYMPHOCYTES # BLD AUTO: 2065 CELLS/UL (ref 850–3900)
LYMPHOCYTES NFR BLD AUTO: 33.3 %
MCH RBC QN AUTO: 31 PG (ref 27–33)
MCHC RBC AUTO-ENTMCNC: 33.2 G/DL (ref 32–36)
MCV RBC AUTO: 93.4 FL (ref 80–100)
MICROALBUMIN UR-MCNC: 1.2 MG/DL
MONOCYTES # BLD AUTO: 508 CELLS/UL (ref 200–950)
MONOCYTES NFR BLD AUTO: 8.2 %
NEUTROPHILS # BLD AUTO: 3497 CELLS/UL (ref 1500–7800)
NEUTROPHILS NFR BLD AUTO: 56.4 %
NONHDLC SERPL-MCNC: 101 MG/DL (CALC)
PLATELET # BLD AUTO: 295 THOUSAND/UL (ref 140–400)
PMV BLD REES-ECKER: 10.4 FL (ref 7.5–12.5)
POTASSIUM SERPL-SCNC: 4 MMOL/L (ref 3.5–5.3)
PROT SERPL-MCNC: 6.9 G/DL (ref 6.1–8.1)
RBC # BLD AUTO: 4.39 MILLION/UL (ref 3.8–5.1)
RBC #/AREA URNS HPF: ABNORMAL /HPF
SERVICE CMNT-IMP: ABNORMAL
SODIUM SERPL-SCNC: 141 MMOL/L (ref 135–146)
SP GR UR STRIP: ABNORMAL
SQUAMOUS #/AREA URNS HPF: ABNORMAL /HPF
TRIGL SERPL-MCNC: 100 MG/DL
TSH SERPL-ACNC: 2.59 MIU/L (ref 0.4–4.5)
WBC # BLD AUTO: 6.2 THOUSAND/UL (ref 3.8–10.8)
WBC #/AREA URNS HPF: ABNORMAL /HPF

## 2021-08-02 ENCOUNTER — TELEPHONE (OUTPATIENT)
Dept: FAMILY MEDICINE | Facility: CLINIC | Age: 61
End: 2021-08-02

## 2021-08-02 DIAGNOSIS — N39.0 URINARY TRACT INFECTION WITHOUT HEMATURIA, SITE UNSPECIFIED: Primary | ICD-10-CM

## 2021-08-02 RX ORDER — CIPROFLOXACIN 500 MG/1
500 TABLET ORAL EVERY 12 HOURS
Qty: 10 TABLET | Refills: 0 | Status: SHIPPED | OUTPATIENT
Start: 2021-08-02 | End: 2021-11-10 | Stop reason: ALTCHOICE

## 2021-08-09 ENCOUNTER — TELEPHONE (OUTPATIENT)
Dept: FAMILY MEDICINE | Facility: CLINIC | Age: 61
End: 2021-08-09

## 2021-08-17 ENCOUNTER — TELEPHONE (OUTPATIENT)
Dept: FAMILY MEDICINE | Facility: CLINIC | Age: 61
End: 2021-08-17

## 2021-08-17 ENCOUNTER — HOSPITAL ENCOUNTER (OUTPATIENT)
Dept: RADIOLOGY | Facility: HOSPITAL | Age: 61
Discharge: HOME | End: 2021-08-17
Attending: NURSE PRACTITIONER
Payer: COMMERCIAL

## 2021-08-17 DIAGNOSIS — Z12.31 SCREENING MAMMOGRAM, ENCOUNTER FOR: ICD-10-CM

## 2021-08-17 DIAGNOSIS — R59.1 LYMPHADENOPATHY OF HEAD AND NECK: ICD-10-CM

## 2021-08-17 PROCEDURE — 77067 SCR MAMMO BI INCL CAD: CPT | Mod: TC,PO

## 2021-08-17 PROCEDURE — 76536 US EXAM OF HEAD AND NECK: CPT | Mod: TC,PO

## 2021-08-18 ENCOUNTER — TELEPHONE (OUTPATIENT)
Dept: FAMILY MEDICINE | Facility: CLINIC | Age: 61
End: 2021-08-18

## 2021-08-18 LAB — NONINV COLON CA DNA+OCC BLD SCRN STL QL: NEGATIVE

## 2021-08-26 ENCOUNTER — TELEPHONE (OUTPATIENT)
Dept: FAMILY MEDICINE | Facility: CLINIC | Age: 61
End: 2021-08-26

## 2021-09-08 ENCOUNTER — TELEPHONE (OUTPATIENT)
Dept: FAMILY MEDICINE | Facility: CLINIC | Age: 61
End: 2021-09-08

## 2021-09-08 RX ORDER — SEMAGLUTIDE 1.34 MG/ML
0.5 INJECTION, SOLUTION SUBCUTANEOUS
Qty: 3 PEN | Refills: 3 | Status: SHIPPED | OUTPATIENT
Start: 2021-09-08 | End: 2022-01-04 | Stop reason: SDUPTHER

## 2021-11-09 ENCOUNTER — TELEPHONE (OUTPATIENT)
Dept: FAMILY MEDICINE | Facility: CLINIC | Age: 61
End: 2021-11-09
Payer: COMMERCIAL

## 2021-11-10 ENCOUNTER — OFFICE VISIT (OUTPATIENT)
Dept: FAMILY MEDICINE | Facility: CLINIC | Age: 61
End: 2021-11-10
Payer: COMMERCIAL

## 2021-11-10 VITALS
WEIGHT: 198 LBS | DIASTOLIC BLOOD PRESSURE: 84 MMHG | HEIGHT: 71 IN | TEMPERATURE: 98 F | HEART RATE: 80 BPM | SYSTOLIC BLOOD PRESSURE: 132 MMHG | BODY MASS INDEX: 27.72 KG/M2

## 2021-11-10 DIAGNOSIS — N30.00 ACUTE CYSTITIS WITHOUT HEMATURIA: Primary | ICD-10-CM

## 2021-11-10 DIAGNOSIS — E78.2 MIXED HYPERLIPIDEMIA: ICD-10-CM

## 2021-11-10 DIAGNOSIS — E08.65 DIABETES MELLITUS DUE TO UNDERLYING CONDITION WITH HYPERGLYCEMIA, WITHOUT LONG-TERM CURRENT USE OF INSULIN: ICD-10-CM

## 2021-11-10 DIAGNOSIS — R39.9 UTI SYMPTOMS: ICD-10-CM

## 2021-11-10 LAB
BILIRUB UR QL STRIP: POSITIVE
GLUCOSE UR QL STRIP: NEGATIVE
HBA1C MFR BLD: 6.9 %
KETONES UR QL STRIP: NEGATIVE
LEUKOCYTE ESTERASE UR QL STRIP: NEGATIVE
PH, POC UA: 5.5
POC BLOOD, URINE: POSITIVE
POC NITRATES, URINE: NEGATIVE
PROT UR QL STRIP: POSITIVE
SP GR UR STRIP: 1.03 (ref 1–1.03)
UROBILINOGEN UR STRIP-ACNC: NEGATIVE (ref 0.1–1.1)

## 2021-11-10 PROCEDURE — 3008F PR BODY MASS INDEX (BMI) DOCUMENTED: ICD-10-PCS | Mod: S$GLB,,, | Performed by: PHYSICIAN ASSISTANT

## 2021-11-10 PROCEDURE — 1160F RVW MEDS BY RX/DR IN RCRD: CPT | Mod: S$GLB,,, | Performed by: PHYSICIAN ASSISTANT

## 2021-11-10 PROCEDURE — 3044F PR MOST RECENT HEMOGLOBIN A1C LEVEL <7.0%: ICD-10-PCS | Mod: S$GLB,,, | Performed by: PHYSICIAN ASSISTANT

## 2021-11-10 PROCEDURE — 83036 HEMOGLOBIN GLYCOSYLATED A1C: CPT | Mod: QW,,, | Performed by: PHYSICIAN ASSISTANT

## 2021-11-10 PROCEDURE — 3008F BODY MASS INDEX DOCD: CPT | Mod: S$GLB,,, | Performed by: PHYSICIAN ASSISTANT

## 2021-11-10 PROCEDURE — 81003 POCT URINALYSIS, DIPSTICK, AUTOMATED, W/O SCOPE: ICD-10-PCS | Mod: 59,QW,S$GLB, | Performed by: PHYSICIAN ASSISTANT

## 2021-11-10 PROCEDURE — 99214 OFFICE O/P EST MOD 30 MIN: CPT | Mod: 25,S$GLB,, | Performed by: PHYSICIAN ASSISTANT

## 2021-11-10 PROCEDURE — 1160F PR REVIEW ALL MEDS BY PRESCRIBER/CLIN PHARMACIST DOCUMENTED: ICD-10-PCS | Mod: S$GLB,,, | Performed by: PHYSICIAN ASSISTANT

## 2021-11-10 PROCEDURE — 99214 PR OFFICE/OUTPT VISIT, EST, LEVL IV, 30-39 MIN: ICD-10-PCS | Mod: 25,S$GLB,, | Performed by: PHYSICIAN ASSISTANT

## 2021-11-10 PROCEDURE — 83036 POCT HEMOGLOBIN A1C: ICD-10-PCS | Mod: QW,,, | Performed by: PHYSICIAN ASSISTANT

## 2021-11-10 PROCEDURE — 3044F HG A1C LEVEL LT 7.0%: CPT | Mod: S$GLB,,, | Performed by: PHYSICIAN ASSISTANT

## 2021-11-10 PROCEDURE — 81003 URINALYSIS AUTO W/O SCOPE: CPT | Mod: 59,QW,S$GLB, | Performed by: PHYSICIAN ASSISTANT

## 2021-11-10 RX ORDER — PHENAZOPYRIDINE HYDROCHLORIDE 100 MG/1
100 TABLET, FILM COATED ORAL 3 TIMES DAILY PRN
Qty: 6 TABLET | Refills: 0 | Status: SHIPPED | OUTPATIENT
Start: 2021-11-10 | End: 2021-11-12

## 2021-11-10 RX ORDER — NITROFURANTOIN 25; 75 MG/1; MG/1
100 CAPSULE ORAL 2 TIMES DAILY
Qty: 14 CAPSULE | Refills: 0 | Status: SHIPPED | OUTPATIENT
Start: 2021-11-10 | End: 2021-11-12 | Stop reason: SDUPTHER

## 2021-11-10 RX ORDER — ROSUVASTATIN CALCIUM 20 MG/1
20 TABLET, COATED ORAL DAILY
Qty: 90 TABLET | Refills: 1 | Status: SHIPPED | OUTPATIENT
Start: 2021-11-10 | End: 2022-07-25 | Stop reason: SDUPTHER

## 2021-11-11 LAB
ALBUMIN SERPL-MCNC: 4.3 G/DL (ref 3.6–5.1)
ALBUMIN/GLOB SERPL: 1.5 (CALC) (ref 1–2.5)
ALP SERPL-CCNC: 75 U/L (ref 37–153)
ALT SERPL-CCNC: 32 U/L (ref 6–29)
AST SERPL-CCNC: 21 U/L (ref 10–35)
BILIRUB SERPL-MCNC: 0.4 MG/DL (ref 0.2–1.2)
BUN SERPL-MCNC: 13 MG/DL (ref 7–25)
BUN/CREAT SERPL: ABNORMAL (CALC) (ref 6–22)
CALCIUM SERPL-MCNC: 9.8 MG/DL (ref 8.6–10.4)
CHLORIDE SERPL-SCNC: 102 MMOL/L (ref 98–110)
CHOLEST SERPL-MCNC: 226 MG/DL
CHOLEST/HDLC SERPL: 4 (CALC)
CO2 SERPL-SCNC: 29 MMOL/L (ref 20–32)
CREAT SERPL-MCNC: 0.69 MG/DL (ref 0.5–0.99)
GLOBULIN SER CALC-MCNC: 2.8 G/DL (CALC) (ref 1.9–3.7)
GLUCOSE SERPL-MCNC: 115 MG/DL (ref 65–99)
HDLC SERPL-MCNC: 56 MG/DL
LDLC SERPL CALC-MCNC: 136 MG/DL (CALC)
NONHDLC SERPL-MCNC: 170 MG/DL (CALC)
POTASSIUM SERPL-SCNC: 4.4 MMOL/L (ref 3.5–5.3)
PROT SERPL-MCNC: 7.1 G/DL (ref 6.1–8.1)
SODIUM SERPL-SCNC: 139 MMOL/L (ref 135–146)
TRIGL SERPL-MCNC: 199 MG/DL

## 2021-11-12 ENCOUNTER — TELEPHONE (OUTPATIENT)
Dept: FAMILY MEDICINE | Facility: CLINIC | Age: 61
End: 2021-11-12
Payer: COMMERCIAL

## 2021-11-12 DIAGNOSIS — N30.00 ACUTE CYSTITIS WITHOUT HEMATURIA: ICD-10-CM

## 2021-11-12 RX ORDER — NITROFURANTOIN 25; 75 MG/1; MG/1
100 CAPSULE ORAL 2 TIMES DAILY
Qty: 14 CAPSULE | Refills: 0 | Status: SHIPPED | OUTPATIENT
Start: 2021-11-12 | End: 2021-11-19

## 2021-11-13 LAB — BACTERIA UR CULT: ABNORMAL

## 2021-11-15 ENCOUNTER — TELEPHONE (OUTPATIENT)
Dept: FAMILY MEDICINE | Facility: CLINIC | Age: 61
End: 2021-11-15
Payer: COMMERCIAL

## 2021-12-28 ENCOUNTER — LAB VISIT (OUTPATIENT)
Dept: PRIMARY CARE CLINIC | Facility: OTHER | Age: 61
End: 2021-12-28
Attending: INTERNAL MEDICINE
Payer: COMMERCIAL

## 2021-12-28 ENCOUNTER — PATIENT MESSAGE (OUTPATIENT)
Dept: ADMINISTRATIVE | Facility: OTHER | Age: 61
End: 2021-12-28
Payer: COMMERCIAL

## 2021-12-28 DIAGNOSIS — Z20.822 ENCOUNTER FOR LABORATORY TESTING FOR COVID-19 VIRUS: ICD-10-CM

## 2021-12-28 PROCEDURE — U0003 INFECTIOUS AGENT DETECTION BY NUCLEIC ACID (DNA OR RNA); SEVERE ACUTE RESPIRATORY SYNDROME CORONAVIRUS 2 (SARS-COV-2) (CORONAVIRUS DISEASE [COVID-19]), AMPLIFIED PROBE TECHNIQUE, MAKING USE OF HIGH THROUGHPUT TECHNOLOGIES AS DESCRIBED BY CMS-2020-01-R: HCPCS | Performed by: INTERNAL MEDICINE

## 2021-12-30 LAB
SARS-COV-2 RNA RESP QL NAA+PROBE: NOT DETECTED
SARS-COV-2- CYCLE NUMBER: NORMAL

## 2022-01-04 ENCOUNTER — TELEPHONE (OUTPATIENT)
Dept: FAMILY MEDICINE | Facility: CLINIC | Age: 62
End: 2022-01-04
Payer: COMMERCIAL

## 2022-01-04 DIAGNOSIS — E08.65 DIABETES MELLITUS DUE TO UNDERLYING CONDITION WITH HYPERGLYCEMIA, WITHOUT LONG-TERM CURRENT USE OF INSULIN: Primary | ICD-10-CM

## 2022-01-04 DIAGNOSIS — N39.0 URINARY TRACT INFECTION WITHOUT HEMATURIA, SITE UNSPECIFIED: Primary | ICD-10-CM

## 2022-01-04 RX ORDER — PHENAZOPYRIDINE HYDROCHLORIDE 100 MG/1
100 TABLET, FILM COATED ORAL 3 TIMES DAILY PRN
Qty: 15 TABLET | Refills: 0 | Status: SHIPPED | OUTPATIENT
Start: 2022-01-04 | End: 2022-01-09

## 2022-01-04 RX ORDER — SEMAGLUTIDE 1.34 MG/ML
0.5 INJECTION, SOLUTION SUBCUTANEOUS
Qty: 3 PEN | Refills: 3 | Status: SHIPPED | OUTPATIENT
Start: 2022-01-04 | End: 2022-04-04

## 2022-01-04 RX ORDER — CIPROFLOXACIN 500 MG/1
500 TABLET ORAL EVERY 12 HOURS
Qty: 10 TABLET | Refills: 0 | Status: SHIPPED | OUTPATIENT
Start: 2022-01-04 | End: 2022-01-09

## 2022-01-04 NOTE — TELEPHONE ENCOUNTER
----- Message from Sammie Gaspar sent at 1/4/2022 12:10 PM CST -----  Diana with [facility] pharmacy called and stated that the patient need a 90 supply of her semaglutide for her insurance to cover it please give her a call at [phone]

## 2022-01-04 NOTE — TELEPHONE ENCOUNTER
Spoke to pt she is currently having UTi symptoms. Lower abd pressure, burning with urination, urinary frequency. Pt states symptoms started yesterday.  [facility] [address]

## 2022-01-04 NOTE — TELEPHONE ENCOUNTER
----- Message from [place] Elzbieta sent at 1/4/2022 12:28 PM CST -----  Pt calling said she is in pain and having Uti symptoms needs to be seen or have something called in.  She is also not able to fill the Ozempic as it is currently written that they will only cover a 3 month supply.    # [phone]  [facility] [address]

## 2022-02-08 ENCOUNTER — TELEPHONE (OUTPATIENT)
Dept: FAMILY MEDICINE | Facility: CLINIC | Age: 62
End: 2022-02-08
Payer: COMMERCIAL

## 2022-02-08 DIAGNOSIS — E08.65 DIABETES MELLITUS DUE TO UNDERLYING CONDITION WITH HYPERGLYCEMIA, WITHOUT LONG-TERM CURRENT USE OF INSULIN: ICD-10-CM

## 2022-02-08 DIAGNOSIS — E78.2 MIXED HYPERLIPIDEMIA: Primary | ICD-10-CM

## 2022-02-08 NOTE — TELEPHONE ENCOUNTER
----- Message from Cheng Parisi MA sent at 11/12/2021 12:50 PM CST -----  Regarding: Repeat Labs   Repeat CMP and Lipid panel in 3 months          your cholesterol levels are elevated: total cholesterol is 226, ideally would like this below 200. LDL cholesterol is elevated at 136, ideally would like this below 100.

## 2022-02-08 NOTE — TELEPHONE ENCOUNTER
Left mess to call me back, no comm consent, so that I can remind her fasting lab is due, orders at [facility]. Orders pended. Updated reminder.

## 2022-02-08 NOTE — TELEPHONE ENCOUNTER
Sammie Carias, RT  Caller: Unspecified (Today,  3:04 PM)  Patient called and stated that she was returning a call please give her a call at [phone]

## 2022-02-10 ENCOUNTER — TELEPHONE (OUTPATIENT)
Dept: FAMILY MEDICINE | Facility: CLINIC | Age: 62
End: 2022-02-10
Payer: COMMERCIAL

## 2022-02-10 LAB
ALBUMIN SERPL-MCNC: 4.2 G/DL (ref 3.6–5.1)
ALBUMIN/GLOB SERPL: 1.7 (CALC) (ref 1–2.5)
ALP SERPL-CCNC: 62 U/L (ref 37–153)
ALT SERPL-CCNC: 32 U/L (ref 6–29)
AST SERPL-CCNC: 27 U/L (ref 10–35)
BILIRUB SERPL-MCNC: 0.4 MG/DL (ref 0.2–1.2)
BUN SERPL-MCNC: 17 MG/DL (ref 7–25)
BUN/CREAT SERPL: ABNORMAL (CALC) (ref 6–22)
CALCIUM SERPL-MCNC: 9.4 MG/DL (ref 8.6–10.4)
CHLORIDE SERPL-SCNC: 104 MMOL/L (ref 98–110)
CHOLEST SERPL-MCNC: 124 MG/DL
CHOLEST/HDLC SERPL: 2.7 (CALC)
CO2 SERPL-SCNC: 26 MMOL/L (ref 20–32)
CREAT SERPL-MCNC: 0.69 MG/DL (ref 0.5–0.99)
GLOBULIN SER CALC-MCNC: 2.5 G/DL (CALC) (ref 1.9–3.7)
GLUCOSE SERPL-MCNC: 147 MG/DL (ref 65–99)
HDLC SERPL-MCNC: 46 MG/DL
LDLC SERPL CALC-MCNC: 59 MG/DL (CALC)
NONHDLC SERPL-MCNC: 78 MG/DL (CALC)
POTASSIUM SERPL-SCNC: 4.3 MMOL/L (ref 3.5–5.3)
PROT SERPL-MCNC: 6.7 G/DL (ref 6.1–8.1)
SODIUM SERPL-SCNC: 139 MMOL/L (ref 135–146)
TRIGL SERPL-MCNC: 104 MG/DL

## 2022-02-10 NOTE — TELEPHONE ENCOUNTER
LMOR for patient because she was a no-show twice this week.   Unable to leave a message.  Please try calling later to reschedule and let her know that she is at risk for being discharged from the clinic due to the no-show's.

## 2022-02-15 ENCOUNTER — TELEPHONE (OUTPATIENT)
Dept: FAMILY MEDICINE | Facility: CLINIC | Age: 62
End: 2022-02-15
Payer: COMMERCIAL

## 2022-02-15 NOTE — TELEPHONE ENCOUNTER
Pt no showed his last appt . Please call to reschedule and advise pt that they are at risk for being d/c.

## 2022-02-17 ENCOUNTER — OFFICE VISIT (OUTPATIENT)
Dept: FAMILY MEDICINE | Facility: CLINIC | Age: 62
End: 2022-02-17
Payer: COMMERCIAL

## 2022-02-17 VITALS
DIASTOLIC BLOOD PRESSURE: 80 MMHG | HEIGHT: 71 IN | OXYGEN SATURATION: 97 % | SYSTOLIC BLOOD PRESSURE: 122 MMHG | WEIGHT: 200 LBS | HEART RATE: 82 BPM | BODY MASS INDEX: 28 KG/M2

## 2022-02-17 DIAGNOSIS — E78.2 MIXED HYPERLIPIDEMIA: ICD-10-CM

## 2022-02-17 DIAGNOSIS — E08.65 DIABETES MELLITUS DUE TO UNDERLYING CONDITION WITH HYPERGLYCEMIA, WITHOUT LONG-TERM CURRENT USE OF INSULIN: Primary | ICD-10-CM

## 2022-02-17 DIAGNOSIS — L08.9 SKIN INFECTION: ICD-10-CM

## 2022-02-17 LAB — HBA1C MFR BLD: 7.2 %

## 2022-02-17 PROCEDURE — 1160F PR REVIEW ALL MEDS BY PRESCRIBER/CLIN PHARMACIST DOCUMENTED: ICD-10-PCS | Mod: S$GLB,,, | Performed by: NURSE PRACTITIONER

## 2022-02-17 PROCEDURE — 1160F RVW MEDS BY RX/DR IN RCRD: CPT | Mod: S$GLB,,, | Performed by: NURSE PRACTITIONER

## 2022-02-17 PROCEDURE — 3079F DIAST BP 80-89 MM HG: CPT | Mod: S$GLB,,, | Performed by: NURSE PRACTITIONER

## 2022-02-17 PROCEDURE — 3051F HG A1C>EQUAL 7.0%<8.0%: CPT | Mod: S$GLB,,, | Performed by: NURSE PRACTITIONER

## 2022-02-17 PROCEDURE — 3074F PR MOST RECENT SYSTOLIC BLOOD PRESSURE < 130 MM HG: ICD-10-PCS | Mod: S$GLB,,, | Performed by: NURSE PRACTITIONER

## 2022-02-17 PROCEDURE — 99213 PR OFFICE/OUTPT VISIT, EST, LEVL III, 20-29 MIN: ICD-10-PCS | Mod: S$GLB,,, | Performed by: NURSE PRACTITIONER

## 2022-02-17 PROCEDURE — 99213 OFFICE O/P EST LOW 20 MIN: CPT | Mod: S$GLB,,, | Performed by: NURSE PRACTITIONER

## 2022-02-17 PROCEDURE — 83036 HEMOGLOBIN GLYCOSYLATED A1C: CPT | Mod: QW,,, | Performed by: NURSE PRACTITIONER

## 2022-02-17 PROCEDURE — 3008F PR BODY MASS INDEX (BMI) DOCUMENTED: ICD-10-PCS | Mod: S$GLB,,, | Performed by: NURSE PRACTITIONER

## 2022-02-17 PROCEDURE — 3074F SYST BP LT 130 MM HG: CPT | Mod: S$GLB,,, | Performed by: NURSE PRACTITIONER

## 2022-02-17 PROCEDURE — 3051F PR MOST RECENT HEMOGLOBIN A1C LEVEL 7.0 - < 8.0%: ICD-10-PCS | Mod: S$GLB,,, | Performed by: NURSE PRACTITIONER

## 2022-02-17 PROCEDURE — 3008F BODY MASS INDEX DOCD: CPT | Mod: S$GLB,,, | Performed by: NURSE PRACTITIONER

## 2022-02-17 PROCEDURE — 3079F PR MOST RECENT DIASTOLIC BLOOD PRESSURE 80-89 MM HG: ICD-10-PCS | Mod: S$GLB,,, | Performed by: NURSE PRACTITIONER

## 2022-02-17 PROCEDURE — 83036 POCT HEMOGLOBIN A1C: ICD-10-PCS | Mod: QW,,, | Performed by: NURSE PRACTITIONER

## 2022-02-17 RX ORDER — MUPIROCIN 20 MG/G
OINTMENT TOPICAL 3 TIMES DAILY
Qty: 22 G | Refills: 1 | Status: SHIPPED | OUTPATIENT
Start: 2022-02-17 | End: 2023-04-13 | Stop reason: SDUPTHER

## 2022-02-17 NOTE — PROGRESS NOTES
SUBJECTIVE:    Patient ID: Savanna West is a 61 y.o. female.    Chief Complaint: Follow-up (3 mo f/u for diabetes//no other complaints//no med bottles//cologuard done 2021 results good//declined flu vac//tc)    Pt presents for routine diabetic check up. Reports diet has been fine. Taking Metformin as prescribed. Did have some issues with getting Ozempic from the pharmacy so was out for about a month in December. She is back on it now though. Denies any changes in her diet or activity level. Denies any recent illnesses. No trouble with sleep. Has been working a lot lately. Works as a  and remains quite active during the day doing this. Labs were completed prior to visit. Mammogram and colon screening are up to date.         Office Visit on 02/17/2022   Component Date Value Ref Range Status    Hemoglobin A1C 02/17/2022 7.2  % Final   Telephone on 02/08/2022   Component Date Value Ref Range Status    Glucose 02/09/2022 147* 65 - 99 mg/dL Final    BUN 02/09/2022 17  7 - 25 mg/dL Final    Creatinine 02/09/2022 0.69  0.50 - 0.99 mg/dL Final    eGFR if non African American 02/09/2022 94  > OR = 60 mL/min/1.73m2 Final    eGFR if  02/09/2022 109  > OR = 60 mL/min/1.73m2 Final    BUN/Creatinine Ratio 02/09/2022 NOT APPLICABLE  6 - 22 (calc) Final    Sodium 02/09/2022 139  135 - 146 mmol/L Final    Potassium 02/09/2022 4.3  3.5 - 5.3 mmol/L Final    Chloride 02/09/2022 104  98 - 110 mmol/L Final    CO2 02/09/2022 26  20 - 32 mmol/L Final    Calcium 02/09/2022 9.4  8.6 - 10.4 mg/dL Final    Total Protein 02/09/2022 6.7  6.1 - 8.1 g/dL Final    Albumin 02/09/2022 4.2  3.6 - 5.1 g/dL Final    Globulin, Total 02/09/2022 2.5  1.9 - 3.7 g/dL (calc) Final    Albumin/Globulin Ratio 02/09/2022 1.7  1.0 - 2.5 (calc) Final    Total Bilirubin 02/09/2022 0.4  0.2 - 1.2 mg/dL Final    Alkaline Phosphatase 02/09/2022 62  37 - 153 U/L Final    AST 02/09/2022 27  10 - 35 U/L Final    ALT  02/09/2022 32* 6 - 29 U/L Final    Cholesterol 02/09/2022 124  <200 mg/dL Final    HDL 02/09/2022 46* > OR = 50 mg/dL Final    Triglycerides 02/09/2022 104  <150 mg/dL Final    LDL Cholesterol 02/09/2022 59  mg/dL (calc) Final    HDL/Cholesterol Ratio 02/09/2022 2.7  <5.0 (calc) Final    Non HDL Chol. (LDL+VLDL) 02/09/2022 78  <130 mg/dL (calc) Final   Lab Visit on 12/28/2021   Component Date Value Ref Range Status    SARS-CoV2 (COVID-19) Qualitative P* 12/28/2021 Not Detected  Not Detected Final    SARS-COV-2- Cycle Number 12/28/2021 N/A   Final   Office Visit on 11/10/2021   Component Date Value Ref Range Status    POC Blood, Urine 11/10/2021 Positive* Negative Final    POC Bilirubin, Urine 11/10/2021 Positive* Negative Final    POC Urobilinogen, Urine 11/10/2021 Negative  0.1 - 1.1 Final    POC Ketones, Urine 11/10/2021 Negative  Negative Final    POC Protein, Urine 11/10/2021 Positive* Negative Final    POC Nitrates, Urine 11/10/2021 Negative  Negative Final    POC Glucose, Urine 11/10/2021 Negative  Negative Final    pH, UA 11/10/2021 5.5   Final    POC Specific Gravity, Urine 11/10/2021 1.030* 1.003 - 1.029 Final    POC Leukocytes, Urine 11/10/2021 Negative  Negative Final    Hemoglobin A1C 11/10/2021 6.9  % Final    Urine Culture, Routine 11/10/2021 *  Final    Glucose 11/10/2021 115* 65 - 99 mg/dL Final    BUN 11/10/2021 13  7 - 25 mg/dL Final    Creatinine 11/10/2021 0.69  0.50 - 0.99 mg/dL Final    eGFR if non  11/10/2021 94  > OR = 60 mL/min/1.73m2 Final    eGFR if  11/10/2021 109  > OR = 60 mL/min/1.73m2 Final    BUN/Creatinine Ratio 11/10/2021 NOT APPLICABLE  6 - 22 (calc) Final    Sodium 11/10/2021 139  135 - 146 mmol/L Final    Potassium 11/10/2021 4.4  3.5 - 5.3 mmol/L Final    Chloride 11/10/2021 102  98 - 110 mmol/L Final    CO2 11/10/2021 29  20 - 32 mmol/L Final    Calcium 11/10/2021 9.8  8.6 - 10.4 mg/dL Final    Total Protein  11/10/2021 7.1  6.1 - 8.1 g/dL Final    Albumin 11/10/2021 4.3  3.6 - 5.1 g/dL Final    Globulin, Total 11/10/2021 2.8  1.9 - 3.7 g/dL (calc) Final    Albumin/Globulin Ratio 11/10/2021 1.5  1.0 - 2.5 (calc) Final    Total Bilirubin 11/10/2021 0.4  0.2 - 1.2 mg/dL Final    Alkaline Phosphatase 11/10/2021 75  37 - 153 U/L Final    AST 11/10/2021 21  10 - 35 U/L Final    ALT 11/10/2021 32* 6 - 29 U/L Final    Cholesterol 11/10/2021 226* <200 mg/dL Final    HDL 11/10/2021 56  > OR = 50 mg/dL Final    Triglycerides 11/10/2021 199* <150 mg/dL Final    LDL Cholesterol 11/10/2021 136* mg/dL (calc) Final    HDL/Cholesterol Ratio 11/10/2021 4.0  <5.0 (calc) Final    Non HDL Chol. (LDL+VLDL) 11/10/2021 170* <130 mg/dL (calc) Final       Past Medical History:   Diagnosis Date    DVT (deep venous thrombosis)      Past Surgical History:   Procedure Laterality Date    BREAST BIOPSY Right      SECTION      GALLBLADDER SURGERY  2011    KIDNEY STONE SURGERY  2006    CA DILATION/CURETTAGE,DIAGNOSTIC      D & C times 2    SHOULDER SURGERY Right 2010    Reconstruction Dr. Madrigal    TONSILLECTOMY  1964     Family History   Problem Relation Age of Onset    Diabetes Mother     Heart disease Father        Marital Status:   Alcohol History:  reports no history of alcohol use.  Tobacco History:  reports that she has never smoked. She has never used smokeless tobacco.  Drug History:  reports no history of drug use.    Review of patient's allergies indicates:   Allergen Reactions    Codeine Nausea And Vomiting       Current Outpatient Medications:     aspirin (ECOTRIN) 81 MG EC tablet, Take 81 mg by mouth once daily., Disp: , Rfl:     Bifidobacterium infantis (ALIGN) 10.5 mg (10 million cell) Chew, Take 1 tablet by mouth once daily., Disp: , Rfl:     blood sugar diagnostic Strp, 60 strips by Misc.(Non-Drug; Combo Route) route 2 (two) times a day., Disp: 60 strip, Rfl: 0    blood-glucose  meter kit, Use as instructed, Disp: 1 each, Rfl: 0    cholecalciferol, vitamin D3, (VITAMIN D3) 2,000 unit Tab, Take 1 tablet by mouth once daily. , Disp: , Rfl:     cyanocobalamin-cobamamide (B12) 5,000-100 mcg Lozg, Place 1 lozenge under the tongue once daily. , Disp: , Rfl:     diphenhydrAMINE (BENADRYL) 25 mg capsule, Take 25 mg by mouth every 6 (six) hours as needed for Itching., Disp: , Rfl:     fluconazole (DIFLUCAN) 150 MG Tab, Take one tab PO at onset of symptoms. Take additional tablet PO on day 3 if symptoms persist., Disp: 2 tablet, Rfl: 0    guaiFENesin (MUCINEX) 600 mg 12 hr tablet, Take 1,200 mg by mouth 2 (two) times daily., Disp: , Rfl:     ibuprofen (ADVIL,MOTRIN) 400 MG tablet, Take 1 tablet (400 mg total) by mouth every 6 (six) hours as needed., Disp: 20 tablet, Rfl: 0    magnesium 30 mg Tab, Take 30 mg by mouth once. , Disp: , Rfl:     metFORMIN (GLUCOPHAGE) 500 MG tablet, Take 2 tablets (1,000 mg total) by mouth 2 (two) times daily with meals., Disp: 360 tablet, Rfl: 3    multivitamin with minerals (HAIR,SKIN AND NAILS ORAL), Take 1 tablet by mouth once daily. , Disp: , Rfl:     mupirocin (BACTROBAN) 2 % ointment, Apply topically 2 (two) times daily. Apply to affected area, Disp: , Rfl:     mupirocin (BACTROBAN) 2 % ointment, Apply topically 3 (three) times daily., Disp: 22 g, Rfl: 1    ondansetron (ZOFRAN) 4 MG tablet, Take 1 tablet (4 mg total) by mouth every 6 (six) hours as needed for Nausea., Disp: 12 tablet, Rfl: 0    rosuvastatin (CRESTOR) 20 MG tablet, Take 1 tablet (20 mg total) by mouth once daily., Disp: 90 tablet, Rfl: 1    semaglutide (OZEMPIC) 0.25 mg or 0.5 mg(2 mg/1.5 mL) pen injector, Inject 0.5 mg into the skin every 7 days., Disp: 3 pen, Rfl: 3    vitamin E 400 UNIT capsule, Take 400 Units by mouth once daily., Disp: , Rfl:     zinc sulfate (ZINC-15 ORAL), Take 1 tablet by mouth once daily. , Disp: , Rfl:     Review of Systems   Constitutional: Negative for  "activity change, fatigue and unexpected weight change.   HENT: Negative.    Respiratory: Negative for chest tightness and shortness of breath.    Cardiovascular: Negative for chest pain and palpitations.   Gastrointestinal: Positive for constipation. Negative for diarrhea.        Mild constipation- has been taking Probiotic to help.    Musculoskeletal: Negative.    Neurological: Negative.    Psychiatric/Behavioral: Negative for dysphoric mood and sleep disturbance.          Objective:      Vitals:    02/17/22 0836   BP: 122/80   Pulse: 82   SpO2: 97%   Weight: 90.7 kg (200 lb)   Height: 5' 10.5" (1.791 m)     Body mass index is 28.29 kg/m².  Physical Exam  Constitutional:       Appearance: Normal appearance.   HENT:      Head: Normocephalic and atraumatic.      Right Ear: Tympanic membrane normal.      Left Ear: Tympanic membrane normal.   Cardiovascular:      Rate and Rhythm: Normal rate and regular rhythm.      Pulses: Normal pulses.   Pulmonary:      Effort: Pulmonary effort is normal. No respiratory distress.      Breath sounds: Normal breath sounds.   Abdominal:      General: There is no distension.      Tenderness: There is no abdominal tenderness.   Musculoskeletal:      Cervical back: Normal range of motion. No tenderness.   Skin:     General: Skin is warm.   Neurological:      Mental Status: She is alert and oriented to person, place, and time.   Psychiatric:         Mood and Affect: Mood normal.           Assessment:       1. Diabetes mellitus due to underlying condition with hyperglycemia, without long-term current use of insulin    2. Skin infection    3. Mixed hyperlipidemia    4. BMI 28.0-28.9,adult         Plan:       Diabetes mellitus due to underlying condition with hyperglycemia, without long-term current use of insulin  -     Hemoglobin A1C, POCT  - A1C has risen mildly to 7.2. Pt now back on Ozempic regularly. If not below 7 at next visit, consider increasing dose of ozempic to 1mg/weekly    Skin " infection  -     mupirocin (BACTROBAN) 2 % ointment; Apply topically 3 (three) times daily.  Dispense: 22 g; Refill: 1    Mixed hyperlipidemia    BMI 28.0-28.9,adult      Follow up in about 3 months (around 5/17/2022) for diabetes.

## 2022-04-18 DIAGNOSIS — L50.9 URTICARIA OF UNKNOWN ORIGIN: Primary | ICD-10-CM

## 2022-04-18 RX ORDER — PREDNISONE 20 MG/1
20 TABLET ORAL DAILY
Qty: 5 TABLET | Refills: 0 | Status: SHIPPED | OUTPATIENT
Start: 2022-04-18 | End: 2022-04-23

## 2022-04-18 RX ORDER — HYDROXYZINE PAMOATE 25 MG/1
25 CAPSULE ORAL 4 TIMES DAILY
Qty: 60 CAPSULE | Refills: 0 | Status: SHIPPED | OUTPATIENT
Start: 2022-04-18 | End: 2022-04-25

## 2022-04-18 RX ORDER — CEFUROXIME AXETIL 500 MG/1
500 TABLET ORAL EVERY 12 HOURS
Qty: 14 TABLET | Refills: 0 | Status: SHIPPED | OUTPATIENT
Start: 2022-04-18 | End: 2022-04-25

## 2022-04-18 NOTE — TELEPHONE ENCOUNTER
Pt would like something called in to her pharmacy for uti.  Appt was scheduled . States she need a rx today.  Please Advise . Pharmacy is verified.

## 2022-04-18 NOTE — TELEPHONE ENCOUNTER
Start Prednisone 20mg qd x 5 days and OTC Zyrtec 10mg qd. To help with itchiness, start prn Vistaril 25mg.    These prescriptions were sent to [facility].  If trouble swallowing, swelling of the lips or tongue, or difficulty breathing occurs, present to the ER imminently .   This will be further assessed tomorrow on her office visit.

## 2022-04-18 NOTE — TELEPHONE ENCOUNTER
----- Message from Kellen Parekh sent at 4/18/2022  1:09 PM CDT -----  Vm 12:58pm pt needs hives. Would like to be seen or something called in   [phone]

## 2022-04-19 ENCOUNTER — OFFICE VISIT (OUTPATIENT)
Dept: FAMILY MEDICINE | Facility: CLINIC | Age: 62
End: 2022-04-19
Payer: COMMERCIAL

## 2022-04-19 VITALS
OXYGEN SATURATION: 97 % | HEIGHT: 70 IN | SYSTOLIC BLOOD PRESSURE: 122 MMHG | HEART RATE: 85 BPM | BODY MASS INDEX: 28.26 KG/M2 | TEMPERATURE: 98 F | DIASTOLIC BLOOD PRESSURE: 76 MMHG | WEIGHT: 197.38 LBS

## 2022-04-19 DIAGNOSIS — E08.65 DIABETES MELLITUS DUE TO UNDERLYING CONDITION WITH HYPERGLYCEMIA, WITHOUT LONG-TERM CURRENT USE OF INSULIN: ICD-10-CM

## 2022-04-19 DIAGNOSIS — R39.9 UTI SYMPTOMS: ICD-10-CM

## 2022-04-19 DIAGNOSIS — E78.2 MIXED HYPERLIPIDEMIA: ICD-10-CM

## 2022-04-19 DIAGNOSIS — N30.00 ACUTE CYSTITIS WITHOUT HEMATURIA: Primary | ICD-10-CM

## 2022-04-19 LAB
BILIRUB UR QL STRIP: POSITIVE
GLUCOSE UR QL STRIP: POSITIVE
KETONES UR QL STRIP: POSITIVE
LEUKOCYTE ESTERASE UR QL STRIP: POSITIVE
PH, POC UA: 5
POC BLOOD, URINE: NEGATIVE
POC NITRATES, URINE: POSITIVE
PROT UR QL STRIP: POSITIVE
SP GR UR STRIP: 1.03 (ref 1–1.03)
UROBILINOGEN UR STRIP-ACNC: ABNORMAL (ref 0.1–1.1)

## 2022-04-19 PROCEDURE — 81003 POCT URINALYSIS, DIPSTICK, AUTOMATED, W/O SCOPE: ICD-10-PCS | Mod: QW,S$GLB,, | Performed by: PHYSICIAN ASSISTANT

## 2022-04-19 PROCEDURE — 1160F RVW MEDS BY RX/DR IN RCRD: CPT | Mod: S$GLB,,, | Performed by: PHYSICIAN ASSISTANT

## 2022-04-19 PROCEDURE — 99213 PR OFFICE/OUTPT VISIT, EST, LEVL III, 20-29 MIN: ICD-10-PCS | Mod: 25,S$GLB,, | Performed by: PHYSICIAN ASSISTANT

## 2022-04-19 PROCEDURE — 3008F BODY MASS INDEX DOCD: CPT | Mod: S$GLB,,, | Performed by: PHYSICIAN ASSISTANT

## 2022-04-19 PROCEDURE — 81003 URINALYSIS AUTO W/O SCOPE: CPT | Mod: QW,S$GLB,, | Performed by: PHYSICIAN ASSISTANT

## 2022-04-19 PROCEDURE — 99213 OFFICE O/P EST LOW 20 MIN: CPT | Mod: 25,S$GLB,, | Performed by: PHYSICIAN ASSISTANT

## 2022-04-19 PROCEDURE — 3008F PR BODY MASS INDEX (BMI) DOCUMENTED: ICD-10-PCS | Mod: S$GLB,,, | Performed by: PHYSICIAN ASSISTANT

## 2022-04-19 PROCEDURE — 1160F PR REVIEW ALL MEDS BY PRESCRIBER/CLIN PHARMACIST DOCUMENTED: ICD-10-PCS | Mod: S$GLB,,, | Performed by: PHYSICIAN ASSISTANT

## 2022-04-19 RX ORDER — NITROFURANTOIN 25; 75 MG/1; MG/1
100 CAPSULE ORAL 2 TIMES DAILY
Qty: 20 CAPSULE | Refills: 0 | Status: SHIPPED | OUTPATIENT
Start: 2022-04-19 | End: 2022-04-29

## 2022-04-19 RX ORDER — PHENAZOPYRIDINE HYDROCHLORIDE 200 MG/1
200 TABLET, FILM COATED ORAL 3 TIMES DAILY PRN
Qty: 9 TABLET | Refills: 0 | Status: SHIPPED | OUTPATIENT
Start: 2022-04-19 | End: 2022-04-22

## 2022-04-19 NOTE — PROGRESS NOTES
"  SUBJECTIVE:    Patient ID: Savanna West is a 61 y.o. female.    Chief Complaint: Urinary Tract Infection (No bottles/vaccines denied/uti symptoms x 3 days//dp)    Pt is a 61 y.o. female who presents today for a sick visit with a CC of "suprapubic pressure and burning when urinating" that started acutely this past Sunday.  She reports increased urinary frequency. She reports taking Pyridum x 4 doses, which temportailty alleviates the pain.  Additionally, she reports increasing her daily intake of cranberry juice, but has not experienced significant relief of symptoms. She denies any hematuria, flank pain, or fevers.    Office Visit on 04/19/2022   Component Date Value Ref Range Status    POC Blood, Urine 04/19/2022 Negative  Negative Final    POC Bilirubin, Urine 04/19/2022 Positive (A) Negative Final    POC Urobilinogen, Urine 04/19/2022 3+  0.1 - 1.1 Final    POC Ketones, Urine 04/19/2022 Positive (A) Negative Final    POC Protein, Urine 04/19/2022 Positive (A) Negative Final    POC Nitrates, Urine 04/19/2022 Positive (A) Negative Final    POC Glucose, Urine 04/19/2022 Positive (A) Negative Final    pH, UA 04/19/2022 5.0   Final    POC Specific Gravity, Urine 04/19/2022 1.030 (A) 1.003 - 1.029 Final    POC Leukocytes, Urine 04/19/2022 Positive (A) Negative Final   Office Visit on 02/17/2022   Component Date Value Ref Range Status    Hemoglobin A1C 02/17/2022 7.2  % Final   Telephone on 02/08/2022   Component Date Value Ref Range Status    Glucose 02/09/2022 147 (A) 65 - 99 mg/dL Final    BUN 02/09/2022 17  7 - 25 mg/dL Final    Creatinine 02/09/2022 0.69  0.50 - 0.99 mg/dL Final    eGFR if non African American 02/09/2022 94  > OR = 60 mL/min/1.73m2 Final    eGFR if  02/09/2022 109  > OR = 60 mL/min/1.73m2 Final    BUN/Creatinine Ratio 02/09/2022 NOT APPLICABLE  6 - 22 (calc) Final    Sodium 02/09/2022 139  135 - 146 mmol/L Final    Potassium 02/09/2022 4.3  3.5 - 5.3 mmol/L " Final    Chloride 02/09/2022 104  98 - 110 mmol/L Final    CO2 02/09/2022 26  20 - 32 mmol/L Final    Calcium 02/09/2022 9.4  8.6 - 10.4 mg/dL Final    Total Protein 02/09/2022 6.7  6.1 - 8.1 g/dL Final    Albumin 02/09/2022 4.2  3.6 - 5.1 g/dL Final    Globulin, Total 02/09/2022 2.5  1.9 - 3.7 g/dL (calc) Final    Albumin/Globulin Ratio 02/09/2022 1.7  1.0 - 2.5 (calc) Final    Total Bilirubin 02/09/2022 0.4  0.2 - 1.2 mg/dL Final    Alkaline Phosphatase 02/09/2022 62  37 - 153 U/L Final    AST 02/09/2022 27  10 - 35 U/L Final    ALT 02/09/2022 32 (A) 6 - 29 U/L Final    Cholesterol 02/09/2022 124  <200 mg/dL Final    HDL 02/09/2022 46 (A) > OR = 50 mg/dL Final    Triglycerides 02/09/2022 104  <150 mg/dL Final    LDL Cholesterol 02/09/2022 59  mg/dL (calc) Final    HDL/Cholesterol Ratio 02/09/2022 2.7  <5.0 (calc) Final    Non HDL Chol. (LDL+VLDL) 02/09/2022 78  <130 mg/dL (calc) Final   Lab Visit on 12/28/2021   Component Date Value Ref Range Status    SARS-CoV2 (COVID-19) Qualitative P* 12/28/2021 Not Detected  Not Detected Final    SARS-COV-2- Cycle Number 12/28/2021 N/A   Final   Office Visit on 11/10/2021   Component Date Value Ref Range Status    POC Blood, Urine 11/10/2021 Positive (A) Negative Final    POC Bilirubin, Urine 11/10/2021 Positive (A) Negative Final    POC Urobilinogen, Urine 11/10/2021 Negative  0.1 - 1.1 Final    POC Ketones, Urine 11/10/2021 Negative  Negative Final    POC Protein, Urine 11/10/2021 Positive (A) Negative Final    POC Nitrates, Urine 11/10/2021 Negative  Negative Final    POC Glucose, Urine 11/10/2021 Negative  Negative Final    pH, UA 11/10/2021 5.5   Final    POC Specific Gravity, Urine 11/10/2021 1.030 (A) 1.003 - 1.029 Final    POC Leukocytes, Urine 11/10/2021 Negative  Negative Final    Hemoglobin A1C 11/10/2021 6.9  % Final    Urine Culture, Routine 11/10/2021  (A)  Final    Glucose 11/10/2021 115 (A) 65 - 99 mg/dL Final    BUN 11/10/2021  13  7 - 25 mg/dL Final    Creatinine 11/10/2021 0.69  0.50 - 0.99 mg/dL Final    eGFR if non  11/10/2021 94  > OR = 60 mL/min/1.73m2 Final    eGFR if  11/10/2021 109  > OR = 60 mL/min/1.73m2 Final    BUN/Creatinine Ratio 11/10/2021 NOT APPLICABLE  6 - 22 (calc) Final    Sodium 11/10/2021 139  135 - 146 mmol/L Final    Potassium 11/10/2021 4.4  3.5 - 5.3 mmol/L Final    Chloride 11/10/2021 102  98 - 110 mmol/L Final    CO2 11/10/2021 29  20 - 32 mmol/L Final    Calcium 11/10/2021 9.8  8.6 - 10.4 mg/dL Final    Total Protein 11/10/2021 7.1  6.1 - 8.1 g/dL Final    Albumin 11/10/2021 4.3  3.6 - 5.1 g/dL Final    Globulin, Total 11/10/2021 2.8  1.9 - 3.7 g/dL (calc) Final    Albumin/Globulin Ratio 11/10/2021 1.5  1.0 - 2.5 (calc) Final    Total Bilirubin 11/10/2021 0.4  0.2 - 1.2 mg/dL Final    Alkaline Phosphatase 11/10/2021 75  37 - 153 U/L Final    AST 11/10/2021 21  10 - 35 U/L Final    ALT 11/10/2021 32 (A) 6 - 29 U/L Final    Cholesterol 11/10/2021 226 (A) <200 mg/dL Final    HDL 11/10/2021 56  > OR = 50 mg/dL Final    Triglycerides 11/10/2021 199 (A) <150 mg/dL Final    LDL Cholesterol 11/10/2021 136 (A) mg/dL (calc) Final    HDL/Cholesterol Ratio 11/10/2021 4.0  <5.0 (calc) Final    Non HDL Chol. (LDL+VLDL) 11/10/2021 170 (A) <130 mg/dL (calc) Final       Past Medical History:   Diagnosis Date    DVT (deep venous thrombosis)      Past Surgical History:   Procedure Laterality Date    BREAST BIOPSY Right 1998     SECTION      GALLBLADDER SURGERY  2011    KIDNEY STONE SURGERY  2006    MD DILATION/CURETTAGE,DIAGNOSTIC      D & C times 2    SHOULDER SURGERY Right 2010    Reconstruction Dr. Madrigal    TONSILLECTOMY  1964     Family History   Problem Relation Age of Onset    Diabetes Mother     Heart disease Father        Marital Status:   Alcohol History:  reports no history of alcohol use.  Tobacco History:  reports that she has  never smoked. She has never used smokeless tobacco.  Drug History:  reports no history of drug use.    Health Maintenance Topics with due status: Not Due       Topic Last Completion Date    Colorectal Cancer Screening 08/11/2021    Mammogram 08/17/2021    Lipid Panel 02/09/2022       There is no immunization history on file for this patient.    Review of patient's allergies indicates:   Allergen Reactions    Codeine Nausea And Vomiting       Current Outpatient Medications:     aspirin (ECOTRIN) 81 MG EC tablet, Take 81 mg by mouth once daily., Disp: , Rfl:     Bifidobacterium infantis (ALIGN) 10.5 mg (10 million cell) Chew, Take 1 tablet by mouth once daily., Disp: , Rfl:     blood sugar diagnostic Strp, 60 strips by Misc.(Non-Drug; Combo Route) route 2 (two) times a day., Disp: 60 strip, Rfl: 0    cefUROXime (CEFTIN) 500 MG tablet, Take 1 tablet (500 mg total) by mouth every 12 (twelve) hours. for 7 days, Disp: 14 tablet, Rfl: 0    cholecalciferol, vitamin D3, (VITAMIN D3) 50 mcg (2,000 unit) Tab, Take 1 tablet by mouth once daily. , Disp: , Rfl:     cyanocobalamin-cobamamide 5,000-100 mcg Lozg, Place 1 lozenge under the tongue once daily. , Disp: , Rfl:     diphenhydrAMINE (BENADRYL) 25 mg capsule, Take 25 mg by mouth every 6 (six) hours as needed for Itching., Disp: , Rfl:     fluconazole (DIFLUCAN) 150 MG Tab, Take one tab PO at onset of symptoms. Take additional tablet PO on day 3 if symptoms persist., Disp: 2 tablet, Rfl: 0    guaiFENesin (MUCINEX) 600 mg 12 hr tablet, Take 1,200 mg by mouth 2 (two) times daily., Disp: , Rfl:     hydrOXYzine pamoate (VISTARIL) 25 MG Cap, Take 1 capsule (25 mg total) by mouth 4 (four) times daily. for 15 days, Disp: 60 capsule, Rfl: 0    ibuprofen (ADVIL,MOTRIN) 400 MG tablet, Take 1 tablet (400 mg total) by mouth every 6 (six) hours as needed., Disp: 20 tablet, Rfl: 0    magnesium 30 mg Tab, Take 30 mg by mouth once. , Disp: , Rfl:     metFORMIN (GLUCOPHAGE) 500  MG tablet, Take 2 tablets (1,000 mg total) by mouth 2 (two) times daily with meals., Disp: 360 tablet, Rfl: 3    multivitamin with minerals (HAIR,SKIN AND NAILS ORAL), Take 1 tablet by mouth once daily. , Disp: , Rfl:     mupirocin (BACTROBAN) 2 % ointment, Apply topically 2 (two) times daily. Apply to affected area, Disp: , Rfl:     mupirocin (BACTROBAN) 2 % ointment, Apply topically 3 (three) times daily., Disp: 22 g, Rfl: 1    ondansetron (ZOFRAN) 4 MG tablet, Take 1 tablet (4 mg total) by mouth every 6 (six) hours as needed for Nausea., Disp: 12 tablet, Rfl: 0    predniSONE (DELTASONE) 20 MG tablet, Take 1 tablet (20 mg total) by mouth once daily. for 5 days, Disp: 5 tablet, Rfl: 0    rosuvastatin (CRESTOR) 20 MG tablet, Take 1 tablet (20 mg total) by mouth once daily., Disp: 90 tablet, Rfl: 1    vitamin E 400 UNIT capsule, Take 400 Units by mouth once daily., Disp: , Rfl:     zinc sulfate (ZINC-15 ORAL), Take 1 tablet by mouth once daily. , Disp: , Rfl:     blood-glucose meter kit, Use as instructed, Disp: 1 each, Rfl: 0    nitrofurantoin, macrocrystal-monohydrate, (MACROBID) 100 MG capsule, Take 1 capsule (100 mg total) by mouth 2 (two) times daily. for 10 days, Disp: 20 capsule, Rfl: 0    phenazopyridine (PYRIDIUM) 200 MG tablet, Take 1 tablet (200 mg total) by mouth 3 (three) times daily as needed for Pain., Disp: 9 tablet, Rfl: 0    Review of Systems   Constitutional: Negative for activity change, chills, fatigue and fever.   Respiratory: Negative for cough, shortness of breath and wheezing.    Cardiovascular: Negative for chest pain, palpitations and leg swelling.   Gastrointestinal: Negative for abdominal pain, constipation, diarrhea, nausea and vomiting.   Genitourinary: Positive for dysuria and frequency. Negative for difficulty urinating, hematuria and urgency.   Neurological: Negative for dizziness.   Psychiatric/Behavioral: Negative for behavioral problems.          Objective:      Vitals:  "   04/19/22 1632   BP: 122/76   Pulse: 85   Temp: 97.9 °F (36.6 °C)   SpO2: 97%   Weight: 89.5 kg (197 lb 6.4 oz)   Height: 5' 10" (1.778 m)     Physical Exam  Vitals and nursing note reviewed.   Constitutional:       General: She is not in acute distress.     Appearance: Normal appearance. She is not ill-appearing, toxic-appearing or diaphoretic.   HENT:      Head: Normocephalic and atraumatic.      Right Ear: External ear normal.      Left Ear: External ear normal.      Nose: No congestion or rhinorrhea.   Eyes:      General: No scleral icterus.        Right eye: No discharge.         Left eye: No discharge.      Conjunctiva/sclera: Conjunctivae normal.   Cardiovascular:      Rate and Rhythm: Normal rate and regular rhythm.      Pulses: Normal pulses.      Heart sounds: Normal heart sounds. No murmur heard.    No friction rub.   Pulmonary:      Effort: Pulmonary effort is normal. No respiratory distress.      Breath sounds: Normal breath sounds. No wheezing, rhonchi or rales.   Abdominal:      General: There is no distension.      Palpations: Abdomen is soft.      Tenderness: There is no abdominal tenderness. There is no right CVA tenderness, left CVA tenderness, guarding or rebound.   Musculoskeletal:         General: Normal range of motion.      Cervical back: Normal range of motion and neck supple.      Right lower leg: No edema.      Left lower leg: No edema.   Skin:     General: Skin is warm and dry.      Coloration: Skin is not jaundiced.      Findings: No rash.   Neurological:      Mental Status: She is alert. Mental status is at baseline.      Cranial Nerves: No cranial nerve deficit.      Gait: Gait normal.   Psychiatric:         Mood and Affect: Mood normal.         Behavior: Behavior normal. Behavior is cooperative.           Assessment:       1. Acute cystitis without hematuria    2. UTI symptoms    3. Mixed hyperlipidemia    4. Diabetes mellitus due to underlying condition with hyperglycemia, without " long-term current use of insulin    5. BMI 28.0-28.9,adult           Plan:       Acute cystitis without hematuria  Comments:  POCT UA today - 3+ Leuk, + Nit  UA sent for culture - results pending. Antibiotic regimens will be titrated appropriately.  Start Macrobid 100mg b.i.d. x 10 days.  Start p.r.n. Pyridium t.i.d x 2 days  Start OTC Vitamin-C 1,000 mg q.d. and increase daily water intake to 6-8, 8oz cups daily.  Orders:  -     nitrofurantoin, macrocrystal-monohydrate, (MACROBID) 100 MG capsule; Take 1 capsule (100 mg total) by mouth 2 (two) times daily. for 10 days  Dispense: 20 capsule; Refill: 0  -     phenazopyridine (PYRIDIUM) 200 MG tablet; Take 1 tablet (200 mg total) by mouth 3 (three) times daily as needed for Pain.  Dispense: 9 tablet; Refill: 0    UTI symptoms  -     POCT Urinalysis, Dipstick, Automated, W/O Scope  -     Urine culture; Future; Expected date: 04/19/2022    Mixed hyperlipidemia    Diabetes mellitus due to underlying condition with hyperglycemia, without long-term current use of insulin    BMI 28.0-28.9,adult      Follow up if symptoms worsen or fail to improve, for UTI.        4/19/2022 Jatinder Beal PA-C

## 2022-04-22 LAB — BACTERIA UR CULT: ABNORMAL

## 2022-04-25 ENCOUNTER — TELEPHONE (OUTPATIENT)
Dept: FAMILY MEDICINE | Facility: CLINIC | Age: 62
End: 2022-04-25

## 2022-04-25 NOTE — TELEPHONE ENCOUNTER
----- Message from JOYCE You sent at 4/25/2022  6:59 AM CDT -----  Please contact patient and inform her that her urine culture came back positive for E. coli.  This infectious bacteria is susceptible to the antibiotic that was started on her office visit.  Continue antibiotic regimen as is.

## 2022-05-17 ENCOUNTER — OFFICE VISIT (OUTPATIENT)
Dept: FAMILY MEDICINE | Facility: CLINIC | Age: 62
End: 2022-05-17
Payer: COMMERCIAL

## 2022-05-17 VITALS
OXYGEN SATURATION: 98 % | DIASTOLIC BLOOD PRESSURE: 78 MMHG | TEMPERATURE: 98 F | HEART RATE: 86 BPM | WEIGHT: 193 LBS | HEIGHT: 70 IN | BODY MASS INDEX: 27.63 KG/M2 | SYSTOLIC BLOOD PRESSURE: 110 MMHG

## 2022-05-17 DIAGNOSIS — E66.3 OVERWEIGHT WITH BODY MASS INDEX (BMI) 25.0-29.9: ICD-10-CM

## 2022-05-17 DIAGNOSIS — E78.2 MIXED HYPERLIPIDEMIA: ICD-10-CM

## 2022-05-17 DIAGNOSIS — Z12.31 OTHER SCREENING MAMMOGRAM: ICD-10-CM

## 2022-05-17 DIAGNOSIS — E08.65 DIABETES MELLITUS DUE TO UNDERLYING CONDITION WITH HYPERGLYCEMIA, WITHOUT LONG-TERM CURRENT USE OF INSULIN: Primary | ICD-10-CM

## 2022-05-17 LAB — HBA1C MFR BLD: 6.6 %

## 2022-05-17 PROCEDURE — 3044F HG A1C LEVEL LT 7.0%: CPT | Mod: CPTII,S$GLB,, | Performed by: PHYSICIAN ASSISTANT

## 2022-05-17 PROCEDURE — 99214 PR OFFICE/OUTPT VISIT, EST, LEVL IV, 30-39 MIN: ICD-10-PCS | Mod: S$GLB,,, | Performed by: PHYSICIAN ASSISTANT

## 2022-05-17 PROCEDURE — 99214 OFFICE O/P EST MOD 30 MIN: CPT | Mod: S$GLB,,, | Performed by: PHYSICIAN ASSISTANT

## 2022-05-17 PROCEDURE — 1160F RVW MEDS BY RX/DR IN RCRD: CPT | Mod: CPTII,S$GLB,, | Performed by: PHYSICIAN ASSISTANT

## 2022-05-17 PROCEDURE — 83036 POCT HEMOGLOBIN A1C: ICD-10-PCS | Mod: QW,,, | Performed by: PHYSICIAN ASSISTANT

## 2022-05-17 PROCEDURE — 3044F PR MOST RECENT HEMOGLOBIN A1C LEVEL <7.0%: ICD-10-PCS | Mod: CPTII,S$GLB,, | Performed by: PHYSICIAN ASSISTANT

## 2022-05-17 PROCEDURE — 1159F MED LIST DOCD IN RCRD: CPT | Mod: CPTII,S$GLB,, | Performed by: PHYSICIAN ASSISTANT

## 2022-05-17 PROCEDURE — 3008F PR BODY MASS INDEX (BMI) DOCUMENTED: ICD-10-PCS | Mod: CPTII,S$GLB,, | Performed by: PHYSICIAN ASSISTANT

## 2022-05-17 PROCEDURE — 1160F PR REVIEW ALL MEDS BY PRESCRIBER/CLIN PHARMACIST DOCUMENTED: ICD-10-PCS | Mod: CPTII,S$GLB,, | Performed by: PHYSICIAN ASSISTANT

## 2022-05-17 PROCEDURE — 83036 HEMOGLOBIN GLYCOSYLATED A1C: CPT | Mod: QW,,, | Performed by: PHYSICIAN ASSISTANT

## 2022-05-17 PROCEDURE — 3008F BODY MASS INDEX DOCD: CPT | Mod: CPTII,S$GLB,, | Performed by: PHYSICIAN ASSISTANT

## 2022-05-17 PROCEDURE — 1159F PR MEDICATION LIST DOCUMENTED IN MEDICAL RECORD: ICD-10-PCS | Mod: CPTII,S$GLB,, | Performed by: PHYSICIAN ASSISTANT

## 2022-05-17 NOTE — PROGRESS NOTES
SUBJECTIVE:    Patient ID: Savanna West is a 61 y.o. female.    Chief Complaint: Diabetes (No bottles/ diabetic follow up/CCS-//dp)    Pt is a 61-year-old female who presents today for a 3 month diabetic follow-up.  She is currently managed on Metformin 1,000mg b.i.d. and Ozempic 0.5mg q.d. She averages 1 meal/day, and eats small proportion sized meals.  Her main source of exercise is work - works as  and reports being on her feet for most of the day.  A 4 lb weight loss is noted since last visit (193 lbs).  Blood sugar at home averages in the 130's.  She denies any vision issues, or peripheral neuropathy symptoms.      Overall, she reports feeling well and is without complaints today.  She does not routinely check her BP at home, so no log was provided. BP is currently stable and well controlled in office.  She denies any CP, palpitations or frequent headaches.    Dr. Park (GYN) - is due for her annual visit.    UTD: Mammogram (08/17/2021) - negative.  UTD:  Cologuard (08/11/2021) - negative - RTC 3.    Office Visit on 05/17/2022   Component Date Value Ref Range Status    Hemoglobin A1C 05/17/2022 6.6  % Final   Office Visit on 04/19/2022   Component Date Value Ref Range Status    POC Blood, Urine 04/19/2022 Negative  Negative Final    POC Bilirubin, Urine 04/19/2022 Positive (A) Negative Final    POC Urobilinogen, Urine 04/19/2022 3+  0.1 - 1.1 Final    POC Ketones, Urine 04/19/2022 Positive (A) Negative Final    POC Protein, Urine 04/19/2022 Positive (A) Negative Final    POC Nitrates, Urine 04/19/2022 Positive (A) Negative Final    POC Glucose, Urine 04/19/2022 Positive (A) Negative Final    pH, UA 04/19/2022 5.0   Final    POC Specific Gravity, Urine 04/19/2022 1.030 (A) 1.003 - 1.029 Final    POC Leukocytes, Urine 04/19/2022 Positive (A) Negative Final    Urine Culture, Routine 04/19/2022  (A)  Final   Office Visit on 02/17/2022   Component Date Value Ref Range  Status    Hemoglobin A1C 2022 7.2  % Final   Telephone on 2022   Component Date Value Ref Range Status    Glucose 2022 147 (A) 65 - 99 mg/dL Final    BUN 2022 17  7 - 25 mg/dL Final    Creatinine 2022 0.69  0.50 - 0.99 mg/dL Final    eGFR if non African American 2022 94  > OR = 60 mL/min/1.73m2 Final    eGFR if  2022 109  > OR = 60 mL/min/1.73m2 Final    BUN/Creatinine Ratio 2022 NOT APPLICABLE  6 -  (calc) Final    Sodium 2022 139  135 - 146 mmol/L Final    Potassium 2022 4.3  3.5 - 5.3 mmol/L Final    Chloride 2022 104  98 - 110 mmol/L Final    CO2 2022 26  20 - 32 mmol/L Final    Calcium 2022 9.4  8.6 - 10.4 mg/dL Final    Total Protein 2022 6.7  6.1 - 8.1 g/dL Final    Albumin 2022 4.2  3.6 - 5.1 g/dL Final    Globulin, Total 2022 2.5  1.9 - 3.7 g/dL (calc) Final    Albumin/Globulin Ratio 2022 1.7  1.0 - 2.5 (calc) Final    Total Bilirubin 2022 0.4  0.2 - 1.2 mg/dL Final    Alkaline Phosphatase 2022 62  37 - 153 U/L Final    AST 2022 27  10 - 35 U/L Final    ALT 2022 32 (A) 6 - 29 U/L Final    Cholesterol 2022 124  <200 mg/dL Final    HDL 2022 46 (A) > OR = 50 mg/dL Final    Triglycerides 2022 104  <150 mg/dL Final    LDL Cholesterol 2022 59  mg/dL (calc) Final    HDL/Cholesterol Ratio 2022 2.7  <5.0 (calc) Final    Non HDL Chol. (LDL+VLDL) 2022 78  <130 mg/dL (calc) Final   Lab Visit on 2021   Component Date Value Ref Range Status    SARS-CoV2 (COVID-19) Qualitative P* 2021 Not Detected  Not Detected Final    SARS-COV-2- Cycle Number 2021 N/A   Final       Past Medical History:   Diagnosis Date    DVT (deep venous thrombosis)      Past Surgical History:   Procedure Laterality Date    BREAST BIOPSY Right 1998     SECTION      GALLBLADDER SURGERY      KIDNEY STONE  SURGERY  2006    TX DILATION/CURETTAGE,DIAGNOSTIC      D & C times 2    SHOULDER SURGERY Right 2010    Reconstruction Dr. Madrigal    TONSILLECTOMY  1964     Family History   Problem Relation Age of Onset    Diabetes Mother     Heart disease Father        Marital Status:   Alcohol History:  reports no history of alcohol use.  Tobacco History:  reports that she has never smoked. She has never used smokeless tobacco.  Drug History:  reports no history of drug use.    Health Maintenance Topics with due status: Not Due       Topic Last Completion Date    Colorectal Cancer Screening 08/11/2021    Mammogram 08/17/2021    Lipid Panel 02/09/2022    Influenza Vaccine Not Due       There is no immunization history on file for this patient.    Review of patient's allergies indicates:   Allergen Reactions    Codeine Nausea And Vomiting       Current Outpatient Medications:     aspirin (ECOTRIN) 81 MG EC tablet, Take 81 mg by mouth once daily., Disp: , Rfl:     Bifidobacterium infantis (ALIGN) 10.5 mg (10 million cell) Chew, Take 1 tablet by mouth once daily., Disp: , Rfl:     blood sugar diagnostic Strp, 60 strips by Misc.(Non-Drug; Combo Route) route 2 (two) times a day., Disp: 60 strip, Rfl: 0    cholecalciferol, vitamin D3, (VITAMIN D3) 50 mcg (2,000 unit) Tab, Take 1 tablet by mouth once daily. , Disp: , Rfl:     cyanocobalamin-cobamamide 5,000-100 mcg Lozg, Place 1 lozenge under the tongue once daily. , Disp: , Rfl:     diphenhydrAMINE (BENADRYL) 25 mg capsule, Take 25 mg by mouth every 6 (six) hours as needed for Itching., Disp: , Rfl:     fluconazole (DIFLUCAN) 150 MG Tab, Take one tab PO at onset of symptoms. Take additional tablet PO on day 3 if symptoms persist., Disp: 2 tablet, Rfl: 0    guaiFENesin (MUCINEX) 600 mg 12 hr tablet, Take 1,200 mg by mouth 2 (two) times daily., Disp: , Rfl:     hydrOXYzine pamoate (VISTARIL) 25 MG Cap, TAKE 1 CAPSULE (25 MG TOTAL) BY MOUTH 4 (FOUR) TIMES DAILY. FOR 15  DAYS, Disp: 60 capsule, Rfl: 0    ibuprofen (ADVIL,MOTRIN) 400 MG tablet, Take 1 tablet (400 mg total) by mouth every 6 (six) hours as needed., Disp: 20 tablet, Rfl: 0    magnesium 30 mg Tab, Take 30 mg by mouth once. , Disp: , Rfl:     metFORMIN (GLUCOPHAGE) 500 MG tablet, Take 2 tablets (1,000 mg total) by mouth 2 (two) times daily with meals., Disp: 360 tablet, Rfl: 3    multivitamin with minerals (HAIR,SKIN AND NAILS ORAL), Take 1 tablet by mouth once daily. , Disp: , Rfl:     mupirocin (BACTROBAN) 2 % ointment, Apply topically 2 (two) times daily. Apply to affected area, Disp: , Rfl:     mupirocin (BACTROBAN) 2 % ointment, Apply topically 3 (three) times daily., Disp: 22 g, Rfl: 1    ondansetron (ZOFRAN) 4 MG tablet, Take 1 tablet (4 mg total) by mouth every 6 (six) hours as needed for Nausea., Disp: 12 tablet, Rfl: 0    rosuvastatin (CRESTOR) 20 MG tablet, Take 1 tablet (20 mg total) by mouth once daily., Disp: 90 tablet, Rfl: 1    vitamin E 400 UNIT capsule, Take 400 Units by mouth once daily., Disp: , Rfl:     zinc sulfate (ZINC-15 ORAL), Take 1 tablet by mouth once daily. , Disp: , Rfl:     blood-glucose meter kit, Use as instructed, Disp: 1 each, Rfl: 0    Review of Systems   Constitutional: Negative for activity change, appetite change, chills, fatigue and fever.   HENT: Negative for congestion, ear discharge, ear pain and sore throat.    Eyes: Negative for pain and visual disturbance.   Respiratory: Negative for cough, shortness of breath and wheezing.    Cardiovascular: Negative for chest pain and palpitations.   Gastrointestinal: Negative for abdominal pain, blood in stool, constipation, diarrhea, nausea and vomiting.   Genitourinary: Negative for difficulty urinating, dysuria, frequency, hematuria and urgency.   Musculoskeletal: Negative for arthralgias and myalgias.   Neurological: Negative for dizziness, syncope, weakness, light-headedness and headaches.   Psychiatric/Behavioral: Negative  "for behavioral problems.          Objective:      Vitals:    05/17/22 0837   BP: 110/78   Pulse: 86   Temp: 97.7 °F (36.5 °C)   SpO2: 98%   Weight: 87.5 kg (193 lb)   Height: 5' 10" (1.778 m)     Physical Exam  Vitals and nursing note reviewed.   Constitutional:       General: She is not in acute distress.     Appearance: Normal appearance. She is normal weight. She is not ill-appearing, toxic-appearing or diaphoretic.   HENT:      Head: Normocephalic and atraumatic.      Right Ear: Tympanic membrane, ear canal and external ear normal. There is no impacted cerumen.      Left Ear: Tympanic membrane, ear canal and external ear normal. There is no impacted cerumen.      Nose: No rhinorrhea.      Mouth/Throat:      Mouth: Mucous membranes are moist.      Pharynx: Oropharynx is clear. No oropharyngeal exudate or posterior oropharyngeal erythema.   Eyes:      General: No scleral icterus.        Right eye: No discharge.         Left eye: No discharge.      Extraocular Movements: Extraocular movements intact.      Conjunctiva/sclera: Conjunctivae normal.   Cardiovascular:      Rate and Rhythm: Normal rate and regular rhythm.      Pulses: Normal pulses.      Heart sounds: Normal heart sounds. No murmur heard.    No friction rub.   Pulmonary:      Effort: Pulmonary effort is normal. No respiratory distress.      Breath sounds: Normal breath sounds. No wheezing, rhonchi or rales.   Abdominal:      General: There is no distension.      Palpations: Abdomen is soft.      Tenderness: There is no abdominal tenderness. There is no right CVA tenderness, left CVA tenderness, guarding or rebound.   Musculoskeletal:         General: Normal range of motion.      Cervical back: Normal range of motion and neck supple.      Right lower leg: No edema.      Left lower leg: No edema.   Skin:     General: Skin is warm and dry.      Coloration: Skin is not jaundiced.      Findings: No rash.   Neurological:      Mental Status: She is alert. " Mental status is at baseline.      Cranial Nerves: No cranial nerve deficit.      Gait: Gait normal.   Psychiatric:         Mood and Affect: Mood normal.         Behavior: Behavior normal. Behavior is cooperative.           Assessment:       1. Diabetes mellitus due to underlying condition with hyperglycemia, without long-term current use of insulin    2. Mixed hyperlipidemia    3. Other screening mammogram    4. Overweight with body mass index (BMI) 25.0-29.9           Plan:       Diabetes mellitus due to underlying condition with hyperglycemia, without long-term current use of insulin  Comments:  POCT A1c today:  6.6%  Currently stable controlled - continue current medication regimen as is.  No refills requested today  Orders:  -     Hemoglobin A1C, POCT  -     CBC Auto Differential; Future; Expected date: 05/17/2022  -     Comprehensive Metabolic Panel; Future; Expected date: 05/17/2022  -     Urinalysis, Reflex to Urine Culture Urine, Clean Catch  -     Microalbumin/creatinine urine ratio    Mixed hyperlipidemia  Comments:  Tot Chol: 124, LDL: 59, HDL: 46  Currently well controlled Crestor 20mg qd - continue as is  Repeat lipid panel and CMP ordered today.  Orders:  -     Lipid Panel; Future; Expected date: 05/17/2022    Other screening mammogram  Comments:  Screening mammogram ordered today.  Orders:  -     Mammo Digital Screening Bilat; Future; Expected date: 05/17/2022    Overweight with body mass index (BMI) 25.0-29.9  -     TSH w/reflex to FT4; Future; Expected date: 05/17/2022    Repeat lab work ordered today and to be completed as patient is fasting.    Patient was encouraged to contact her GYN and to have her annual visit scheduled.    Follow up in about 6 months (around 11/17/2022) for Diabetic Check-Up, With labs prior to visit.        5/17/2022 Jatinder Beal PA-C

## 2022-05-18 LAB
ALBUMIN SERPL-MCNC: 4.1 G/DL (ref 3.6–5.1)
ALBUMIN/GLOB SERPL: 2.1 (CALC) (ref 1–2.5)
ALP SERPL-CCNC: 61 U/L (ref 37–153)
ALT SERPL-CCNC: 24 U/L (ref 6–29)
AST SERPL-CCNC: 19 U/L (ref 10–35)
BASOPHILS # BLD AUTO: 31 CELLS/UL (ref 0–200)
BASOPHILS NFR BLD AUTO: 0.6 %
BILIRUB SERPL-MCNC: 0.5 MG/DL (ref 0.2–1.2)
BUN SERPL-MCNC: 21 MG/DL (ref 7–25)
BUN/CREAT SERPL: ABNORMAL (CALC) (ref 6–22)
CALCIUM SERPL-MCNC: 8.9 MG/DL (ref 8.6–10.4)
CHLORIDE SERPL-SCNC: 110 MMOL/L (ref 98–110)
CHOLEST SERPL-MCNC: 98 MG/DL
CHOLEST/HDLC SERPL: 2.4 (CALC)
CO2 SERPL-SCNC: 23 MMOL/L (ref 20–32)
CREAT SERPL-MCNC: 0.65 MG/DL (ref 0.5–0.99)
EOSINOPHIL # BLD AUTO: 31 CELLS/UL (ref 15–500)
EOSINOPHIL NFR BLD AUTO: 0.6 %
ERYTHROCYTE [DISTWIDTH] IN BLOOD BY AUTOMATED COUNT: 12.1 % (ref 11–15)
GLOBULIN SER CALC-MCNC: 2 G/DL (CALC) (ref 1.9–3.7)
GLUCOSE SERPL-MCNC: 134 MG/DL (ref 65–99)
HCT VFR BLD AUTO: 37.4 % (ref 35–45)
HDLC SERPL-MCNC: 41 MG/DL
HGB BLD-MCNC: 12.4 G/DL (ref 11.7–15.5)
LDLC SERPL CALC-MCNC: 42 MG/DL (CALC)
LYMPHOCYTES # BLD AUTO: 1877 CELLS/UL (ref 850–3900)
LYMPHOCYTES NFR BLD AUTO: 36.8 %
MCH RBC QN AUTO: 30.5 PG (ref 27–33)
MCHC RBC AUTO-ENTMCNC: 33.2 G/DL (ref 32–36)
MCV RBC AUTO: 91.9 FL (ref 80–100)
MONOCYTES # BLD AUTO: 485 CELLS/UL (ref 200–950)
MONOCYTES NFR BLD AUTO: 9.5 %
NEUTROPHILS # BLD AUTO: 2678 CELLS/UL (ref 1500–7800)
NEUTROPHILS NFR BLD AUTO: 52.5 %
NONHDLC SERPL-MCNC: 57 MG/DL (CALC)
PLATELET # BLD AUTO: 226 THOUSAND/UL (ref 140–400)
PMV BLD REES-ECKER: 10.4 FL (ref 7.5–12.5)
POTASSIUM SERPL-SCNC: 3.7 MMOL/L (ref 3.5–5.3)
PROT SERPL-MCNC: 6.1 G/DL (ref 6.1–8.1)
RBC # BLD AUTO: 4.07 MILLION/UL (ref 3.8–5.1)
SODIUM SERPL-SCNC: 142 MMOL/L (ref 135–146)
TRIGL SERPL-MCNC: 73 MG/DL
TSH SERPL-ACNC: 2.45 MIU/L (ref 0.4–4.5)
WBC # BLD AUTO: 5.1 THOUSAND/UL (ref 3.8–10.8)

## 2022-05-20 ENCOUNTER — TELEPHONE (OUTPATIENT)
Dept: FAMILY MEDICINE | Facility: CLINIC | Age: 62
End: 2022-05-20

## 2022-05-20 NOTE — TELEPHONE ENCOUNTER
----- Message from JOYCE You sent at 5/20/2022  8:25 AM CDT -----  Please contact patient and inform her that her lab work was reviewed.  No signs of anemia noted.  Liver, kidney, and thyroid function are within normal limits.  Glucose at the time of the blood draw, was slightly elevated at 134. Cholesterol levels are well controlled.

## 2022-07-25 DIAGNOSIS — E78.2 MIXED HYPERLIPIDEMIA: ICD-10-CM

## 2022-07-25 RX ORDER — ROSUVASTATIN CALCIUM 20 MG/1
20 TABLET, COATED ORAL DAILY
Qty: 90 TABLET | Refills: 1 | Status: SHIPPED | OUTPATIENT
Start: 2022-07-25 | End: 2023-02-06 | Stop reason: SDUPTHER

## 2022-07-25 NOTE — TELEPHONE ENCOUNTER
Patient:   KACIE FUNES            MRN: [id number]            FIN: [id number]              Age:   50 years     Sex:  FEMALE     :  69   Associated Diagnoses:   Gastritis and duodenitis   Author:   MARIKA YANES     Pre-Procedure   Preprocedure diagniosis: Diarrhea, epigastric pain, anemia  Post procedure diagnosis: Gastritis and duodenitis  Procedure performed: EGD with biopsy  Medications: MAC      Procedure Date:  2020 .    Procedure Type:  Esophagogastroduodenoscopy.     Procedure provider:  Performed by MARIKA YANES.    Current history and physical:  Documented on chart.    Informed Consent:  After discussing the rationale, risks and benefits, and alternatives to this procedure, the patient provided signed consent for the procedure.   Indication:  Diarrhea, epigastric pain, anemia.    Medications:   (Selected)   Inpatient Medications  Ordered  Carafate: 1,000 mg, 10 mL, Oral, QID [before meals & HS]  Lactated Ringers 1,000 mL: 100 mL/hr, IV  Lexapro oral 20 mg tablet: 20 mg, 1 tab, Oral, Daily  Norco oral 325-5 mg tablet: 1 tab, Oral, Q6H, PRN: pain severe  acetaminophen oral 500 mg tablet (Tylenol): 500 mg, 1 tab, Oral, Q6H, PRN: pain moderate  atorvastatin oral 20 mg tablet: 20 mg, 1 tab, Oral, Q Bedtime  dextrose (glucose) injection 50%.: 12.5 gm, 25 mL, IV Push, As Directed PRN, PRN: low blood glucose  dextrose (glucose) oral.: 15 gm, Oral, As Directed PRN, PRN: low blood glucose  dicyclomine oral 10 mg capsule (Bentyl): 10 mg, 1 cap, Oral, Daily  glucagon (GlucaGen).: 1 mg, 1 mL, IM, As Directed PRN, PRN: low blood glucose  heparin subcutaneous injection 5,000 unit: 7,500 unit, 1.5 mL, Subcutaneous, Q8H  insulin lispro (HumaLOG) dose correction.: 2-12 units, Subcutaneous, Q6H  levothyroxine oral tablet (Synthroid): 75 mcg, 1 tab, Oral, QAM at 6  losartan oral 25 mg tablet: 25 mg, 1 tab, Oral, Daily  morphine injection: 4 mg, 1 mL, IV Push, Q4H, PRN: pain  ----- Message from Maggie Preston sent at 7/25/2022  1:43 PM CDT -----  VM 1:17   Refill  Rosuvastatin a 90 day supply. The patient said her insurance says they have to do a 90 day. [facility] on [address] pt's #   [phone] GH      severe  ondansetron (Zofran).: 4 mg, 2 mL, Slow IV Push, One Time (unscheduled), PRN: nausea or vomiting  ondansetron injection 2 mg/mL (Zofran): 4 mg, 2 mL, Slow IV Push, Q6H, PRN: nausea or vomiting  pantoprazole oral 40 mg DR tablet: 40 mg, 1 tab, Oral, Q12H  Documented Medications  Documented  Basagljasen FuentesikPen (insulin glargine): 58 units, Subcutaneous, Q Bedtime  Bentyl oral 20 mg tablet: 20 mg, 1 tab, Oral, Daily  Lexapro oral 20 mg tablet: 20 mg, 1 tab, Oral, Daily  acetaminophen-hydrocodone oral 325-5 mg tablet: 1 tab, Oral, Q6H, PRN: for pain, 20 tab, 0 Refill(s)  levothyroxine 75 mcg (0.075 mg) oral tablet: 75 mcg, 1 tab, Oral, QAM at 6  losartan oral 25 mg tablet: 25 mg, 1 tab, Oral, Daily  metFORMIN oral 1,000 mg tablet: 1,000 mg, 1 tab, Oral, BID  pantoprazole 40 mg oral granule, enteric coated: 40 mg, 1 each, Oral, BID  repaglinide oral 0.5 mg tablet (Prandin): 0.5 mg, 1 tab, Oral, Daily [with dinner]  simvastatin oral 40 mg tablet: 1 tab, Oral, Daily.    ASA Classification:  See anesthesia records   .    Monitoring:  See anesthesia record.      Procedure   After the risks, benefits and alternatives of the procedure were thoroughly explained (risks including but not limited to bleeding, infection, reaction to medications, perforation, requiring surgery, damage to face, teeth, jaw, anesthesia related complications,  requiring hospitalization, failure of treatment, need for repeat procedure, pain, disability and death), Informed consent was verified, confirmed and timeout was successfully executed  by the treatment team. Medications were administered by anesthesia after positioning the patient.  The scope was then completely withdrawn from the patient and the procedure completed. The pulse, BP, and O2 saturation were monitored and documented by the physician and the nursing staff throughout the entire procedure. The patient was cared for as planned according to standard protocol. The patient was then  discharged to recovery in stable condition and with appropriate post procedure care.      The procedure was performed in an endoscopy suite in the hospital.  See anesthesia record for sedation given during procedure.  The patient was positioned starting in the left lateral decubitus position.  Endoscope type used was an adult-size, introduced orally, advanced to 2nd part of duodenum.  No difficulties were encountered during the procedure.  Views were excellent.  The patient tolerated the procedure well.    Findings   Esophagus: Unremarkable   Stomach: Nonerosive gastritis was noted, biopsies obtained  Retroflexion views were unremarkable  Duodenum: Duodenitis in the bulb, the second part was unremarkable, biopsies obtained     Post-Procedure   Complications encountered during the procedure were none.  Estimated blood loss:  None.   Blood administered:  No.    Grafts/implants:  None.    Specimens were sent to pathology.  Assistants:  None.    Surgical Tasks Performed by Assistant:  None.      Impression and Plan   EGD:       Diagnosis: Gastritis and duodenitis (GLF51-UB K29.90, Diagnosis, Medical).   Impression:  Gastritis and duodenitis  Recommendations:  Follow pathology results  PPI daily  Proceed with colonoscopy      Recommendations     Repeat EGD procedure is not currently indicated.         Education and Follow-up:       Counseled: Patient, Family, On diagnosis, On treatment.   Please forward a copy of report to primary care and referring physicians

## 2022-08-13 ENCOUNTER — HOSPITAL ENCOUNTER (EMERGENCY)
Facility: HOSPITAL | Age: 62
Discharge: HOME | End: 2022-08-13
Attending: EMERGENCY MEDICINE
Payer: COMMERCIAL

## 2022-08-13 VITALS
OXYGEN SATURATION: 96 % | DIASTOLIC BLOOD PRESSURE: 71 MMHG | WEIGHT: 190 LBS | TEMPERATURE: 98 F | SYSTOLIC BLOOD PRESSURE: 148 MMHG | HEIGHT: 70 IN | BODY MASS INDEX: 27.2 KG/M2 | RESPIRATION RATE: 16 BRPM | HEART RATE: 77 BPM

## 2022-08-13 DIAGNOSIS — N20.1 URETEROLITHIASIS: Primary | ICD-10-CM

## 2022-08-13 DIAGNOSIS — M54.9 BACK PAIN: ICD-10-CM

## 2022-08-13 LAB
ALBUMIN SERPL BCP-MCNC: 4.4 G/DL (ref 3.5–5.2)
ALP SERPL-CCNC: 54 U/L (ref 55–135)
ALT SERPL W/O P-5'-P-CCNC: 23 U/L (ref 10–44)
ANION GAP SERPL CALC-SCNC: 10 MMOL/L (ref 8–16)
AST SERPL-CCNC: 19 U/L (ref 10–40)
BACTERIA #/AREA URNS HPF: NEGATIVE /HPF
BASOPHILS # BLD AUTO: 0.03 K/UL (ref 0–0.2)
BASOPHILS NFR BLD: 0.4 % (ref 0–1.9)
BILIRUB SERPL-MCNC: 0.4 MG/DL (ref 0.1–1)
BILIRUB UR QL STRIP: ABNORMAL
BUN SERPL-MCNC: 18 MG/DL (ref 8–23)
CALCIUM SERPL-MCNC: 9.2 MG/DL (ref 8.7–10.5)
CAOX CRY URNS QL MICRO: ABNORMAL
CHLORIDE SERPL-SCNC: 106 MMOL/L (ref 95–110)
CLARITY UR: ABNORMAL
CO2 SERPL-SCNC: 27 MMOL/L (ref 23–29)
COLOR UR: YELLOW
CREAT SERPL-MCNC: 0.7 MG/DL (ref 0.5–1.4)
DIFFERENTIAL METHOD: ABNORMAL
EOSINOPHIL # BLD AUTO: 0 K/UL (ref 0–0.5)
EOSINOPHIL NFR BLD: 0.3 % (ref 0–8)
ERYTHROCYTE [DISTWIDTH] IN BLOOD BY AUTOMATED COUNT: 12 % (ref 11.5–14.5)
EST. GFR  (NO RACE VARIABLE): >60 ML/MIN/1.73 M^2
GLUCOSE SERPL-MCNC: 135 MG/DL (ref 70–110)
GLUCOSE UR QL STRIP: NEGATIVE
HCT VFR BLD AUTO: 35.1 % (ref 37–48.5)
HGB BLD-MCNC: 11.8 G/DL (ref 12–16)
HGB UR QL STRIP: ABNORMAL
HYALINE CASTS #/AREA URNS LPF: 30 /LPF
IMM GRANULOCYTES # BLD AUTO: 0.02 K/UL (ref 0–0.04)
IMM GRANULOCYTES NFR BLD AUTO: 0.3 % (ref 0–0.5)
KETONES UR QL STRIP: NEGATIVE
LEUKOCYTE ESTERASE UR QL STRIP: ABNORMAL
LIPASE SERPL-CCNC: 38 U/L (ref 4–60)
LYMPHOCYTES # BLD AUTO: 1.3 K/UL (ref 1–4.8)
LYMPHOCYTES NFR BLD: 16.8 % (ref 18–48)
MAGNESIUM SERPL-MCNC: 1.7 MG/DL (ref 1.6–2.6)
MCH RBC QN AUTO: 30.6 PG (ref 27–31)
MCHC RBC AUTO-ENTMCNC: 33.6 G/DL (ref 32–36)
MCV RBC AUTO: 91 FL (ref 82–98)
MICROSCOPIC COMMENT: ABNORMAL
MONOCYTES # BLD AUTO: 0.4 K/UL (ref 0.3–1)
MONOCYTES NFR BLD: 4.6 % (ref 4–15)
NEUTROPHILS # BLD AUTO: 5.9 K/UL (ref 1.8–7.7)
NEUTROPHILS NFR BLD: 77.6 % (ref 38–73)
NITRITE UR QL STRIP: POSITIVE
NRBC BLD-RTO: 0 /100 WBC
PH UR STRIP: 7 [PH] (ref 5–8)
PLATELET # BLD AUTO: 232 K/UL (ref 150–450)
PMV BLD AUTO: 10.4 FL (ref 9.2–12.9)
POTASSIUM SERPL-SCNC: 3.9 MMOL/L (ref 3.5–5.1)
PROT SERPL-MCNC: 7.1 G/DL (ref 6–8.4)
PROT UR QL STRIP: ABNORMAL
RBC # BLD AUTO: 3.85 M/UL (ref 4–5.4)
RBC #/AREA URNS HPF: 46 /HPF (ref 0–4)
SODIUM SERPL-SCNC: 143 MMOL/L (ref 136–145)
SP GR UR STRIP: 1.01 (ref 1–1.03)
SQUAMOUS #/AREA URNS HPF: 3 /HPF
TROPONIN I SERPL DL<=0.01 NG/ML-MCNC: <0.03 NG/ML
URN SPEC COLLECT METH UR: ABNORMAL
UROBILINOGEN UR STRIP-ACNC: ABNORMAL EU/DL
WBC # BLD AUTO: 7.56 K/UL (ref 3.9–12.7)
WBC #/AREA URNS HPF: 9 /HPF (ref 0–5)

## 2022-08-13 PROCEDURE — 96374 THER/PROPH/DIAG INJ IV PUSH: CPT

## 2022-08-13 PROCEDURE — 81001 URINALYSIS AUTO W/SCOPE: CPT | Performed by: EMERGENCY MEDICINE

## 2022-08-13 PROCEDURE — 93010 EKG 12-LEAD: ICD-10-PCS | Mod: ,,, | Performed by: INTERNAL MEDICINE

## 2022-08-13 PROCEDURE — 63600175 PHARM REV CODE 636 W HCPCS: Performed by: EMERGENCY MEDICINE

## 2022-08-13 PROCEDURE — 96376 TX/PRO/DX INJ SAME DRUG ADON: CPT

## 2022-08-13 PROCEDURE — 93005 ELECTROCARDIOGRAM TRACING: CPT | Performed by: INTERNAL MEDICINE

## 2022-08-13 PROCEDURE — 93010 ELECTROCARDIOGRAM REPORT: CPT | Mod: ,,, | Performed by: INTERNAL MEDICINE

## 2022-08-13 PROCEDURE — 84484 ASSAY OF TROPONIN QUANT: CPT | Performed by: EMERGENCY MEDICINE

## 2022-08-13 PROCEDURE — 99285 EMERGENCY DEPT VISIT HI MDM: CPT | Mod: 25

## 2022-08-13 PROCEDURE — 83735 ASSAY OF MAGNESIUM: CPT | Performed by: EMERGENCY MEDICINE

## 2022-08-13 PROCEDURE — 96375 TX/PRO/DX INJ NEW DRUG ADDON: CPT

## 2022-08-13 PROCEDURE — 85025 COMPLETE CBC W/AUTO DIFF WBC: CPT | Performed by: EMERGENCY MEDICINE

## 2022-08-13 PROCEDURE — 83690 ASSAY OF LIPASE: CPT | Performed by: EMERGENCY MEDICINE

## 2022-08-13 PROCEDURE — 80053 COMPREHEN METABOLIC PANEL: CPT | Performed by: EMERGENCY MEDICINE

## 2022-08-13 RX ORDER — MORPHINE SULFATE 4 MG/ML
4 INJECTION, SOLUTION INTRAMUSCULAR; INTRAVENOUS ONCE
Status: COMPLETED | OUTPATIENT
Start: 2022-08-13 | End: 2022-08-13

## 2022-08-13 RX ORDER — NAPROXEN 500 MG/1
500 TABLET ORAL 2 TIMES DAILY WITH MEALS
Qty: 15 TABLET | Refills: 0 | Status: SHIPPED | OUTPATIENT
Start: 2022-08-13

## 2022-08-13 RX ORDER — KETOROLAC TROMETHAMINE 30 MG/ML
15 INJECTION, SOLUTION INTRAMUSCULAR; INTRAVENOUS
Status: COMPLETED | OUTPATIENT
Start: 2022-08-13 | End: 2022-08-13

## 2022-08-13 RX ORDER — ONDANSETRON 4 MG/1
4 TABLET, FILM COATED ORAL EVERY 8 HOURS PRN
Qty: 15 TABLET | Refills: 0 | Status: SHIPPED | OUTPATIENT
Start: 2022-08-13

## 2022-08-13 RX ORDER — TAMSULOSIN HYDROCHLORIDE 0.4 MG/1
0.4 CAPSULE ORAL DAILY
Qty: 10 CAPSULE | Refills: 0 | Status: SHIPPED | OUTPATIENT
Start: 2022-08-13 | End: 2023-01-19

## 2022-08-13 RX ORDER — CEFUROXIME AXETIL 500 MG/1
500 TABLET ORAL EVERY 12 HOURS
Qty: 14 TABLET | Refills: 0 | Status: SHIPPED | OUTPATIENT
Start: 2022-08-13 | End: 2022-08-20

## 2022-08-13 RX ORDER — ONDANSETRON 2 MG/ML
4 INJECTION INTRAMUSCULAR; INTRAVENOUS
Status: COMPLETED | OUTPATIENT
Start: 2022-08-13 | End: 2022-08-13

## 2022-08-13 RX ORDER — HYDROCODONE BITARTRATE AND ACETAMINOPHEN 5; 325 MG/1; MG/1
1 TABLET ORAL EVERY 6 HOURS PRN
Qty: 12 TABLET | Refills: 0 | Status: SHIPPED | OUTPATIENT
Start: 2022-08-13

## 2022-08-13 RX ADMIN — KETOROLAC TROMETHAMINE 15 MG: 30 INJECTION, SOLUTION INTRAMUSCULAR at 05:08

## 2022-08-13 RX ADMIN — MORPHINE SULFATE 4 MG: 4 INJECTION, SOLUTION INTRAMUSCULAR; INTRAVENOUS at 06:08

## 2022-08-13 RX ADMIN — ONDANSETRON 4 MG: 2 INJECTION INTRAMUSCULAR; INTRAVENOUS at 05:08

## 2022-08-13 NOTE — ED TRIAGE NOTES
Left flank pain started this afternoon now radiating into left abd.   + nausea and vomiting x 1.   Denies diarrhea or fever.  Denies urinary complaints.

## 2022-08-13 NOTE — DISCHARGE INSTRUCTIONS
RETURN TO EMERGENCY DEPARTMENT WITHOUT FAIL, IF YOUR SYMPTOMS WORSEN, IF YOU GET NEW OR DIFFERENT SYMPTOMS, IF YOU ARE UNABLE TO FOLLOW UP AS DIRECTED, OR IF YOU HAVE ANY CONCERNS OR WORRIES.    Take medication as directed.  Follow up with Urology on an outpatient basis.

## 2022-08-13 NOTE — ED PROVIDER NOTES
Encounter Date: 2022       History     Chief Complaint   Patient presents with    Abdominal Pain     Left flank/abd pain     This is a 62-year-old female who presents emergency department with left sided flank pain left lower abdominal pain.  Patient says that pain started about an hour ago while at work.  She says that pain is located left flank radiating around to the left lower quadrant region.  She did note some urinary symptoms yesterday of some urgency and burning.  She did take some azo yesterday.  Has not taken any today, denies any urinary symptoms today.  Denies any blood in the urine.  She denies any trauma injury or heavy lifting to her abdomen or to her back.  She denies any chest pain or any trouble breathing.  She has had a prior cholecystectomy and a prior .        Review of patient's allergies indicates:   Allergen Reactions    Codeine Nausea And Vomiting     Past Medical History:   Diagnosis Date    Diabetes mellitus     DVT (deep venous thrombosis)      Past Surgical History:   Procedure Laterality Date    BREAST BIOPSY Right 1998     SECTION      GALLBLADDER SURGERY  2011    KIDNEY STONE SURGERY  2006    [place] DILATION/CURETTAGE,DIAGNOSTIC      D & C times 2    SHOULDER SURGERY Right 2010    Reconstruction Dr. Madrigal    TONSILLECTOMY  1964     Family History   Problem Relation Age of Onset    Diabetes Mother     Heart disease Father      Social History     Tobacco Use    Smoking status: Never Smoker    Smokeless tobacco: Never Used   Substance Use Topics    Alcohol use: No    Drug use: No     Review of Systems   Constitutional: Negative for chills and fever.   HENT: Negative for sore throat.    Respiratory: Negative for shortness of breath.    Cardiovascular: Negative for chest pain.   Gastrointestinal: Positive for abdominal pain. Negative for diarrhea, nausea and vomiting.   Genitourinary: Positive for flank pain and urgency. Negative for dysuria.    Musculoskeletal: Positive for back pain.   Skin: Negative for rash.   Neurological: Negative for weakness and headaches.   Hematological: Does not bruise/bleed easily.   All other systems reviewed and are negative.      Physical Exam     Initial Vitals [08/13/22 1540]   BP Pulse Resp Temp SpO2   (!) 150/68 -- 18 98.9 °F (37.2 °C) 98 %      MAP       --         Physical Exam    Nursing note and vitals reviewed.  Constitutional: She appears well-developed and well-nourished. No distress.   HENT:   Head: Normocephalic and atraumatic.   Mouth/Throat: No oropharyngeal exudate.   Eyes: Conjunctivae and EOM are normal. Pupils are equal, round, and reactive to light.   Neck: Neck supple. No tracheal deviation present.   Normal range of motion.  Cardiovascular: Normal rate, regular rhythm, normal heart sounds and intact distal pulses.   No murmur heard.  Pulmonary/Chest: Breath sounds normal. No stridor. No respiratory distress. She has no wheezes. She has no rhonchi. She has no rales.   Abdominal: Abdomen is soft. She exhibits no distension. There is abdominal tenderness (Mild left lower quadrant).   Left CVA tenderness to palpation.  No rash to suggest herpes zoster There is no rebound.   Musculoskeletal:         General: No tenderness or edema. Normal range of motion.      Cervical back: Normal range of motion and neck supple.     Neurological: She is alert and oriented to person, place, and time. She has normal strength. No cranial nerve deficit or sensory deficit. GCS score is 15. GCS eye subscore is 4. GCS verbal subscore is 5. GCS motor subscore is 6.   Skin: Skin is warm and dry. Capillary refill takes less than 2 seconds. No rash noted. No erythema. No pallor.   Psychiatric: She has a normal mood and affect. Thought content normal.          ED Course   Procedures  Labs Reviewed   CBC W/ AUTO DIFFERENTIAL   COMPREHENSIVE METABOLIC PANEL   URINALYSIS, REFLEX TO URINE CULTURE   MAGNESIUM   LIPASE   TROPONIN I           Imaging Results    None          Medications   ketorolac injection 15 mg (has no administration in time range)   morphine injection 4 mg (has no administration in time range)   ondansetron injection 4 mg (has no administration in time range)     Medical Decision Making:   ED Management:  62-year-old female presents emergency department left back pain left flank pain as described above.  Patient has evidence of a proximal kidney stone.  She does have some white blood cells in her urine, she does not have a fever.  She does not have an elevated white blood cell count.  She does not have any bacteria in her urine.  I do not suspect that she has infected stone.  I did discuss with Urology regardless and arrange for urgent follow-up.  She will be seen next Tuesday by Urology.  Will cover with antibiotics regardless.  The positive nitrite is likely secondary to her taking azo.  Patient will follow-up with Urology on Tuesday.  If symptoms change or worsen, she should return to the emergency department.  I have answered all of her questions and given her some specific and strict return to emergency department precautions.    I had a detailed discussion with the patient and/or guardian regarding: The historical points, exam findings, and diagnostic results supporting the discharge diagnosis, lab results, pertinent radiology results, and the need for outpatient follow-up, for definitive care with a family practitioner, Na urologist and to return to the emergency department if symptoms worsen or persist or if there are any questions or concerns that arise at home. All questions have been answered in detail. Strict return to Emergency Department precautions have been provided        A dictation software program was used for this note.  Please expect some simple typographical  errors in this note.    This patient was seen during the context of the Covid 19 global pandemic where local, state, hospital guidelines, were  followed to the best of ability given the circumstances of the pandemic.               ED Course as of 08/13/22 1849   Sat Aug 13, 2022   1735 EKG 5:12 p.m. normal sinus rhythm rate of 74. No ST elevation or depression.  No STEMI.  EKG interpreted independently by me. [JR]   1802 Patient did take azo his which is likely making it positive nitrite. [JR]   1842 I discussed the case with Dr. Metcalf who stated that he would see the patient in the office on Tuesday.  He agree that this did not sound like an infected stone. [JR]      ED Course User Index  [JR] Wiley Paez DO             Clinical Impression:   Final diagnoses:  [M54.9] Back pain                 Wiley Paez DO  08/13/22 1851

## 2022-09-04 ENCOUNTER — HOSPITAL ENCOUNTER (EMERGENCY)
Facility: HOSPITAL | Age: 62
Discharge: HOME | End: 2022-09-04
Attending: EMERGENCY MEDICINE
Payer: COMMERCIAL

## 2022-09-04 VITALS
WEIGHT: 190 LBS | DIASTOLIC BLOOD PRESSURE: 74 MMHG | BODY MASS INDEX: 27.2 KG/M2 | HEART RATE: 74 BPM | SYSTOLIC BLOOD PRESSURE: 141 MMHG | HEIGHT: 70 IN | RESPIRATION RATE: 18 BRPM | OXYGEN SATURATION: 93 % | TEMPERATURE: 98 F

## 2022-09-04 DIAGNOSIS — N20.1 URETEROLITHIASIS: ICD-10-CM

## 2022-09-04 DIAGNOSIS — N23 RENAL COLIC ON LEFT SIDE: ICD-10-CM

## 2022-09-04 DIAGNOSIS — R31.9 URINARY TRACT INFECTION WITH HEMATURIA, SITE UNSPECIFIED: ICD-10-CM

## 2022-09-04 DIAGNOSIS — N39.0 URINARY TRACT INFECTION WITH HEMATURIA, SITE UNSPECIFIED: ICD-10-CM

## 2022-09-04 DIAGNOSIS — R10.9 LEFT FLANK PAIN: Primary | ICD-10-CM

## 2022-09-04 LAB
ALBUMIN SERPL BCP-MCNC: 4.6 G/DL (ref 3.5–5.2)
ALP SERPL-CCNC: 59 U/L (ref 55–135)
ALT SERPL W/O P-5'-P-CCNC: 24 U/L (ref 10–44)
ANION GAP SERPL CALC-SCNC: 9 MMOL/L (ref 8–16)
AST SERPL-CCNC: 24 U/L (ref 10–40)
BACTERIA #/AREA URNS HPF: NEGATIVE /HPF
BASOPHILS # BLD AUTO: 0.06 K/UL (ref 0–0.2)
BASOPHILS NFR BLD: 0.4 % (ref 0–1.9)
BILIRUB SERPL-MCNC: 0.6 MG/DL (ref 0.1–1)
BILIRUB UR QL STRIP: NEGATIVE
BUN SERPL-MCNC: 21 MG/DL (ref 8–23)
CALCIUM SERPL-MCNC: 10.1 MG/DL (ref 8.7–10.5)
CHLORIDE SERPL-SCNC: 103 MMOL/L (ref 95–110)
CLARITY UR: ABNORMAL
CO2 SERPL-SCNC: 28 MMOL/L (ref 23–29)
COLOR UR: YELLOW
CREAT SERPL-MCNC: 1 MG/DL (ref 0.5–1.4)
DIFFERENTIAL METHOD: ABNORMAL
EOSINOPHIL # BLD AUTO: 0.1 K/UL (ref 0–0.5)
EOSINOPHIL NFR BLD: 0.4 % (ref 0–8)
ERYTHROCYTE [DISTWIDTH] IN BLOOD BY AUTOMATED COUNT: 11.9 % (ref 11.5–14.5)
EST. GFR  (NO RACE VARIABLE): >60 ML/MIN/1.73 M^2
GLUCOSE SERPL-MCNC: 156 MG/DL (ref 70–110)
GLUCOSE UR QL STRIP: NEGATIVE
HCT VFR BLD AUTO: 39.4 % (ref 37–48.5)
HGB BLD-MCNC: 13.3 G/DL (ref 12–16)
HGB UR QL STRIP: ABNORMAL
HYALINE CASTS #/AREA URNS LPF: 9 /LPF
IMM GRANULOCYTES # BLD AUTO: 0.06 K/UL (ref 0–0.04)
IMM GRANULOCYTES NFR BLD AUTO: 0.4 % (ref 0–0.5)
KETONES UR QL STRIP: NEGATIVE
LEUKOCYTE ESTERASE UR QL STRIP: ABNORMAL
LYMPHOCYTES # BLD AUTO: 1.9 K/UL (ref 1–4.8)
LYMPHOCYTES NFR BLD: 13.7 % (ref 18–48)
MCH RBC QN AUTO: 31.1 PG (ref 27–31)
MCHC RBC AUTO-ENTMCNC: 33.8 G/DL (ref 32–36)
MCV RBC AUTO: 92 FL (ref 82–98)
MICROSCOPIC COMMENT: ABNORMAL
MONOCYTES # BLD AUTO: 0.8 K/UL (ref 0.3–1)
MONOCYTES NFR BLD: 5.5 % (ref 4–15)
NEUTROPHILS # BLD AUTO: 11 K/UL (ref 1.8–7.7)
NEUTROPHILS NFR BLD: 79.6 % (ref 38–73)
NITRITE UR QL STRIP: NEGATIVE
NRBC BLD-RTO: 0 /100 WBC
PH UR STRIP: 7 [PH] (ref 5–8)
PLATELET # BLD AUTO: 260 K/UL (ref 150–450)
PMV BLD AUTO: 10.2 FL (ref 9.2–12.9)
POTASSIUM SERPL-SCNC: 4 MMOL/L (ref 3.5–5.1)
PROT SERPL-MCNC: 7.3 G/DL (ref 6–8.4)
PROT UR QL STRIP: NEGATIVE
RBC # BLD AUTO: 4.28 M/UL (ref 4–5.4)
RBC #/AREA URNS HPF: >100 /HPF (ref 0–4)
SODIUM SERPL-SCNC: 140 MMOL/L (ref 136–145)
SP GR UR STRIP: 1.01 (ref 1–1.03)
SQUAMOUS #/AREA URNS HPF: 5 /HPF
URN SPEC COLLECT METH UR: ABNORMAL
UROBILINOGEN UR STRIP-ACNC: NEGATIVE EU/DL
WBC # BLD AUTO: 13.86 K/UL (ref 3.9–12.7)
WBC #/AREA URNS HPF: 30 /HPF (ref 0–5)

## 2022-09-04 PROCEDURE — 96375 TX/PRO/DX INJ NEW DRUG ADDON: CPT

## 2022-09-04 PROCEDURE — 63600175 PHARM REV CODE 636 W HCPCS: Performed by: EMERGENCY MEDICINE

## 2022-09-04 PROCEDURE — 96374 THER/PROPH/DIAG INJ IV PUSH: CPT

## 2022-09-04 PROCEDURE — 80053 COMPREHEN METABOLIC PANEL: CPT | Performed by: EMERGENCY MEDICINE

## 2022-09-04 PROCEDURE — 81001 URINALYSIS AUTO W/SCOPE: CPT | Performed by: EMERGENCY MEDICINE

## 2022-09-04 PROCEDURE — 25000003 PHARM REV CODE 250: Performed by: EMERGENCY MEDICINE

## 2022-09-04 PROCEDURE — 99285 EMERGENCY DEPT VISIT HI MDM: CPT | Mod: 25

## 2022-09-04 PROCEDURE — 87086 URINE CULTURE/COLONY COUNT: CPT | Performed by: EMERGENCY MEDICINE

## 2022-09-04 PROCEDURE — 85025 COMPLETE CBC W/AUTO DIFF WBC: CPT | Performed by: EMERGENCY MEDICINE

## 2022-09-04 RX ORDER — CEFUROXIME AXETIL 500 MG/1
500 TABLET ORAL EVERY 12 HOURS
Qty: 20 TABLET | Refills: 0 | Status: SHIPPED | OUTPATIENT
Start: 2022-09-04 | End: 2024-03-08

## 2022-09-04 RX ORDER — TAMSULOSIN HYDROCHLORIDE 0.4 MG/1
0.4 CAPSULE ORAL DAILY
Qty: 10 CAPSULE | Refills: 0 | Status: SHIPPED | OUTPATIENT
Start: 2022-09-04 | End: 2024-03-08

## 2022-09-04 RX ORDER — ONDANSETRON 4 MG/1
4 TABLET, ORALLY DISINTEGRATING ORAL
Status: COMPLETED | OUTPATIENT
Start: 2022-09-04 | End: 2022-09-04

## 2022-09-04 RX ORDER — TAMSULOSIN HYDROCHLORIDE 0.4 MG/1
0.4 CAPSULE ORAL
Status: COMPLETED | OUTPATIENT
Start: 2022-09-04 | End: 2022-09-04

## 2022-09-04 RX ORDER — KETOROLAC TROMETHAMINE 10 MG/1
10 TABLET, FILM COATED ORAL EVERY 6 HOURS
Qty: 20 TABLET | Refills: 0 | Status: SHIPPED | OUTPATIENT
Start: 2022-09-04 | End: 2022-09-09

## 2022-09-04 RX ORDER — ONDANSETRON 2 MG/ML
4 INJECTION INTRAMUSCULAR; INTRAVENOUS
Status: COMPLETED | OUTPATIENT
Start: 2022-09-04 | End: 2022-09-04

## 2022-09-04 RX ORDER — HYDROMORPHONE HYDROCHLORIDE 1 MG/ML
1 INJECTION, SOLUTION INTRAMUSCULAR; INTRAVENOUS; SUBCUTANEOUS
Status: COMPLETED | OUTPATIENT
Start: 2022-09-04 | End: 2022-09-04

## 2022-09-04 RX ADMIN — CEFTRIAXONE 1 G: 1 INJECTION, SOLUTION INTRAVENOUS at 07:09

## 2022-09-04 RX ADMIN — ONDANSETRON 4 MG: 2 INJECTION INTRAMUSCULAR; INTRAVENOUS at 06:09

## 2022-09-04 RX ADMIN — ONDANSETRON 4 MG: 4 TABLET, ORALLY DISINTEGRATING ORAL at 08:09

## 2022-09-04 RX ADMIN — HYDROMORPHONE HYDROCHLORIDE 1 MG: 1 INJECTION, SOLUTION INTRAMUSCULAR; INTRAVENOUS; SUBCUTANEOUS at 06:09

## 2022-09-04 RX ADMIN — TAMSULOSIN HYDROCHLORIDE 0.4 MG: 0.4 CAPSULE ORAL at 07:09

## 2022-09-04 NOTE — ED PROVIDER NOTES
"Encounter Date: 2022       History     Chief Complaint   Patient presents with    Flank Pain     Pt recently dx with renal calculi.  Pt states pain is worse tonight than it has been in the past     Chief complaint is left flank pain radiating to left groin.  This patient was diagnosed here recently with a 2 mm pelvic areas stone on the left.  She was discharged with follow-up with the urologist but could not make it because she has been "so busy at work" the pain tonight is very intense and is worse than the pain she had last week or so when she came which she had to come into the ER by ambulance.  She denies fever chills earache sore throat runny nose chest pain productive cough.  She is had several episodes of vomiting.  No diarrhea.  No difficulty urinating she just finished 7 days antibiotics as well.      Review of patient's allergies indicates:   Allergen Reactions    Opioids - morphine analogues     Codeine Nausea And Vomiting     Past Medical History:   Diagnosis Date    Diabetes mellitus     DVT (deep venous thrombosis)      Past Surgical History:   Procedure Laterality Date    BREAST BIOPSY Right      SECTION      GALLBLADDER SURGERY  2011    KIDNEY STONE SURGERY  2006    [place] DILATION/CURETTAGE,DIAGNOSTIC      D & C times 2    SHOULDER SURGERY Right 2010    Reconstruction Dr. Madrigal    TONSILLECTOMY  1964     Family History   Problem Relation Age of Onset    Diabetes Mother     Heart disease Father      Social History     Tobacco Use    Smoking status: Never    Smokeless tobacco: Never   Substance Use Topics    Alcohol use: No    Drug use: No     Review of Systems   Constitutional:  Negative for chills and fever.   HENT:  Negative for ear pain, rhinorrhea and sore throat.    Eyes:  Negative for pain and visual disturbance.   Respiratory:  Negative for cough and shortness of breath.    Cardiovascular:  Negative for chest pain and palpitations.   Gastrointestinal:  " Positive for abdominal pain. Negative for constipation, diarrhea, nausea and vomiting.        Left flank radiating to the left lower quadrant   Genitourinary:  Negative for dysuria, frequency, hematuria and urgency.   Musculoskeletal:  Negative for back pain, joint swelling and myalgias.   Skin:  Negative for rash.   Neurological:  Negative for dizziness, seizures, weakness and headaches.   Psychiatric/Behavioral:  Negative for dysphoric mood. The patient is not nervous/anxious.      Physical Exam     Initial Vitals [09/04/22 0434]   BP Pulse Resp Temp SpO2   (!) 180/85 73 20 97.7 °F (36.5 °C) 98 %      MAP       --         Physical Exam    Constitutional: She appears well-developed and well-nourished.   Eyes: EOM are normal. Pupils are equal, round, and reactive to light.   Neck:   Normal range of motion.  Cardiovascular:  Normal rate and regular rhythm.           Pulmonary/Chest: Breath sounds normal.   Abdominal: Abdomen is soft. There is no abdominal tenderness.   Musculoskeletal:         General: Normal range of motion.      Cervical back: Normal range of motion.     Neurological: She is alert and oriented to person, place, and time.   Skin: Skin is warm and dry.   Psychiatric: She has a normal mood and affect.       ED Course   Procedures  Labs Reviewed   CBC W/ AUTO DIFFERENTIAL - Abnormal; Notable for the following components:       Result Value    WBC 13.86 (*)     MCH 31.1 (*)     Gran # (ANC) 11.0 (*)     Immature Grans (Abs) 0.06 (*)     Gran % 79.6 (*)     Lymph % 13.7 (*)     All other components within normal limits   COMPREHENSIVE METABOLIC PANEL - Abnormal; Notable for the following components:    Glucose 156 (*)     All other components within normal limits   URINALYSIS, REFLEX TO URINE CULTURE - Abnormal; Notable for the following components:    Appearance, UA Hazy (*)     Occult Blood UA 3+ (*)     Leukocytes, UA 3+ (*)     All other components within normal limits    Narrative:     Specimen  Source->Urine   URINALYSIS MICROSCOPIC - Abnormal; Notable for the following components:    RBC, UA >100 (*)     WBC, UA 30 (*)     Hyaline Casts, UA 9 (*)     All other components within normal limits    Narrative:     Specimen Source->Urine   CULTURE, URINE          Imaging Results              CT Renal Stone Study ABD Pelvis WO (Final result)  Result time 09/04/22 07:17:47      Final result by Barbara Aviles MD (09/04/22 07:17:47)                   Narrative:    CMS MANDATED QUALITY DATA - CT RADIATION - 436    All CT scans at this facility utilize dose modulation, iterative reconstruction, and/or weight based dosing when appropriate to reduce radiation dose to as low as reasonably achievable.    HISTORY: Left flank pain    COMPARISON: 8/13/2022    FINDINGS: Noncontrast axial images were obtained. Nonenhanced study is tailored for the detection of urolithiasis, and is insensitive for abnormalities of the solid organs, vasculature and hollow viscera.    CT ABDOMEN: The lung bases are clear.    The liver, spleen, pancreas have a normal noncontrast appearance.  Gallbladder is absent  Adrenal glands are normal    There is mild left hydroureteronephrosis secondary to a 3 mm left ureteral vesicle junction stone. Previously the stone was in the left ureteropelvic junction. There is a 2 mm stone in the mid left kidney. There is mild perinephric stranding.    The right kidney is normal.    There are no thick-walled or dilated bowel loops. The appendix is normal.    There is no mesenteric or retroperitoneal adenopathy. The aorta is normal in caliber.    CT PELVIS: There is an IUD within the uterus. The ovaries are normal. There is a 3 mm left ureterovesical junction stone. There is no free fluid. There are mild degenerative changes of the spine.    IMPRESSION: 3 mm left ureterovesical junction stone resulting in mild left hydroureteronephrosis. The stone has migrated when compared to the prior study    2 mm  nonobstructing left renal stone    Electronically signed by:  Barbara Aviles MD  9/4/2022 7:17 AM CDT Workstation: [id number]                                     Medications   cefTRIAXone (ROCEPHIN) 1 g/50 mL D5W IVPB (1 g Intravenous New Bag 9/4/22 0748)   HYDROmorphone injection 1 mg (1 mg Intravenous Given 9/4/22 0649)   ondansetron injection 4 mg (4 mg Intravenous Given 9/4/22 0648)   tamsulosin 24 hr capsule 0.4 mg (0.4 mg Oral Given 9/4/22 0748)                Attending Attestation:             Attending ED Notes:   62-year-old female whose care assumed from Dr. Will this morning who has a known history of kidney stones, presented with complaints of pain to left flank and groin area since yesterday.  The patient was seen and evaluated several months ago and diagnosed then with a ureteral calculus but never followed up with Urology.  This morning she noted some dysuria but no complaints of any fever or gross hematuria.  During the ED course the patient was given a dose of Dilaudid fluids and sent for CT scan which showed a 3 mm ureteral calculus at the left UVJ with mild hydronephrosis associated.  Urinalysis showed greater than 100 red cells per high-power field and 30 white cells per high-power field.  CBC has white count of 13.86.  Chemistries only remarkable for blood sugar 156.  When reassessed the patient had essentially no pain.  She will be able to be discharged after receiving a g Rocephin and 0.4 mg of Flomax orally.  She will be referred follow-up to Urology again.             Clinical Impression:   Final diagnoses:  [R10.9] Left flank pain (Primary)  [N23] Renal colic on left side  [N20.1] Ureterolithiasis  [N39.0, R31.9] Urinary tract infection with hematuria, site unspecified      ED Disposition Condition    Discharge Stable          ED Prescriptions       Medication Sig Dispense Start Date End Date Auth. Provider    tamsulosin (FLOMAX) 0.4 mg Cap Take 1 capsule (0.4 mg total) by mouth  once daily. 10 capsule 9/4/2022 9/4/2023 Javi Georges Jr., MD    ketorolac (TORADOL) 10 mg tablet Take 1 tablet (10 mg total) by mouth every 6 (six) hours. for 5 days 20 tablet 9/4/2022 9/9/2022 Javi Georges Jr., MD    cefUROXime (CEFTIN) 500 MG tablet Take 1 tablet (500 mg total) by mouth every 12 (twelve) hours. 20 tablet 9/4/2022 -- Javi Georges Jr., MD          Follow-up Information       Follow up With Specialties Details Why Contact Info    Kiet Newman Jr., MD Urology Schedule an appointment as soon as possible for a visit in 5 days For continued management [address] [place] [address] [address]  [facility] [address]  [phone]               Javi Georges Jr., MD  09/04/22 1011

## 2022-09-04 NOTE — ED NOTES
PT C/O LEFT FLANK PAIN RADIATING INTO HER BACK AND GROIN. NO ACUTE DISTRESS NOTED. STABLE CONDITION.

## 2022-09-04 NOTE — DISCHARGE INSTRUCTIONS
Encourage oral fluids.  Avoid caffeine.  Watch for any fever over 100.8°, intractable nausea vomiting or pain.  If any such problem occurs return to the ER.  Otherwise follow up with Urology next week.  Take analgesics and antibiotics as directed.  Strain urine.

## 2022-09-04 NOTE — ED NOTES
"After IV had been removed and pt was dressed I walked into room to find her sitting in the chair holding an emesis bag. I asked pt if she was ok to which she stated "I'm about to throw up". Spoke to MD Georges who ordered 4mg ODT Zofran and wrote a prescription for Zofran to go home with.   "

## 2022-09-06 LAB — BACTERIA UR CULT: NO GROWTH

## 2022-11-21 ENCOUNTER — TELEPHONE (OUTPATIENT)
Dept: FAMILY MEDICINE | Facility: CLINIC | Age: 62
End: 2022-11-21

## 2022-11-21 NOTE — TELEPHONE ENCOUNTER
----- Message from Oswaldo Parisi sent at 11/21/2022 12:03 PM CST -----  Pt canceled appt on tomorrow and needs to reschedule. She said she would like to reschedule in January. She knows that it will be with one of the other providers.  [phone]

## 2023-01-18 ENCOUNTER — TELEPHONE (OUTPATIENT)
Dept: OPHTHALMOLOGY | Facility: CLINIC | Age: 63
End: 2023-01-18
Payer: COMMERCIAL

## 2023-01-18 NOTE — TELEPHONE ENCOUNTER
Spoke to pt and scheduled urgent appt for tomorrow    -TD----- Message from Jenny Ramirez sent at 1/18/2023  3:53 PM CST -----  Contact: Self  Type:  Sooner Apoointment Request    Caller is requesting a sooner appointment.  Caller declined first available appointment listed below.  Caller will not accept being placed on the waitlist and is requesting a message be sent to doctor.  Name of Caller:Pt  When is the first available appointment?3/6     Would the patient rather a call back or a response via [facility]? Call  Best Call Back Number:[phone]    Additional Information: Pt states she's seeing spots in her eyes

## 2023-01-19 ENCOUNTER — OFFICE VISIT (OUTPATIENT)
Dept: OPTOMETRY | Facility: CLINIC | Age: 63
End: 2023-01-19
Payer: COMMERCIAL

## 2023-01-19 DIAGNOSIS — H43.812 POSTERIOR VITREOUS DETACHMENT OF LEFT EYE: Primary | ICD-10-CM

## 2023-01-19 DIAGNOSIS — H25.13 NUCLEAR SCLEROSIS, BILATERAL: ICD-10-CM

## 2023-01-19 PROCEDURE — 1159F MED LIST DOCD IN RCRD: CPT | Mod: CPTII,S$GLB,, | Performed by: OPTOMETRIST

## 2023-01-19 PROCEDURE — 1160F PR REVIEW ALL MEDS BY PRESCRIBER/CLIN PHARMACIST DOCUMENTED: ICD-10-PCS | Mod: CPTII,S$GLB,, | Performed by: OPTOMETRIST

## 2023-01-19 PROCEDURE — 1159F PR MEDICATION LIST DOCUMENTED IN MEDICAL RECORD: ICD-10-PCS | Mod: CPTII,S$GLB,, | Performed by: OPTOMETRIST

## 2023-01-19 PROCEDURE — 99999 PR PBB SHADOW E&M-EST. PATIENT-LVL III: CPT | Mod: PBBFAC,,, | Performed by: OPTOMETRIST

## 2023-01-19 PROCEDURE — 99999 PR PBB SHADOW E&M-EST. PATIENT-LVL III: ICD-10-PCS | Mod: PBBFAC,,, | Performed by: OPTOMETRIST

## 2023-01-19 PROCEDURE — 1160F RVW MEDS BY RX/DR IN RCRD: CPT | Mod: CPTII,S$GLB,, | Performed by: OPTOMETRIST

## 2023-01-19 PROCEDURE — 99203 PR OFFICE/OUTPT VISIT, NEW, LEVL III, 30-44 MIN: ICD-10-PCS | Mod: S$GLB,,, | Performed by: OPTOMETRIST

## 2023-01-19 PROCEDURE — 99203 OFFICE O/P NEW LOW 30 MIN: CPT | Mod: S$GLB,,, | Performed by: OPTOMETRIST

## 2023-01-19 NOTE — PROGRESS NOTES
HPI    Pt here today for floaters - OS x 1 week.   Vision does get blurry when   floater moves across eye.  Denies any light flashes or eye pain.    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  Last edited by Gissel Rodas on 1/19/2023 10:24 AM.            Assessment /Plan     For exam results, see Encounter Report.    Posterior vitreous detachment of left eye    Nuclear sclerosis, bilateral      1. Posterior vitreous detachment of left eye  PVD OS, negative angela's  RTC immediately if any flashes, worsening floaters, decreased vision, veiling of vision occurs.    2. Nuclear sclerosis, bilateral  Mild, not significant. Discussed possible ocular affects of cataracts. Acceptable BCVA OU. Discussed treatment options. Surgery not recommended at this time. Monitor yearly.       Return for diabetic eye exam

## 2023-02-01 ENCOUNTER — TELEPHONE (OUTPATIENT)
Dept: FAMILY MEDICINE | Facility: CLINIC | Age: 63
End: 2023-02-01

## 2023-02-01 NOTE — TELEPHONE ENCOUNTER
----- Message from Gisele Carrillo LPN sent at 2/1/2023  3:28 PM CST -----    ----- Message -----  From: Jessica Denise  Sent: 2/1/2023   3:12 PM CST  To: [facility] Me[facility] Staff    Pt would like to get an appt for her diabetic check up. [phone]

## 2023-02-06 ENCOUNTER — OFFICE VISIT (OUTPATIENT)
Dept: FAMILY MEDICINE | Facility: CLINIC | Age: 63
End: 2023-02-06
Payer: COMMERCIAL

## 2023-02-06 VITALS
DIASTOLIC BLOOD PRESSURE: 80 MMHG | BODY MASS INDEX: 26.83 KG/M2 | HEART RATE: 77 BPM | WEIGHT: 177 LBS | SYSTOLIC BLOOD PRESSURE: 128 MMHG | HEIGHT: 68 IN

## 2023-02-06 DIAGNOSIS — Z00.00 WELLNESS EXAMINATION: Primary | ICD-10-CM

## 2023-02-06 DIAGNOSIS — E78.2 MIXED HYPERLIPIDEMIA: ICD-10-CM

## 2023-02-06 DIAGNOSIS — N20.0 KIDNEY STONES: ICD-10-CM

## 2023-02-06 DIAGNOSIS — E08.65 DIABETES MELLITUS DUE TO UNDERLYING CONDITION WITH HYPERGLYCEMIA, WITHOUT LONG-TERM CURRENT USE OF INSULIN: ICD-10-CM

## 2023-02-06 LAB — HBA1C MFR BLD: 6.2 %

## 2023-02-06 PROCEDURE — 3079F PR MOST RECENT DIASTOLIC BLOOD PRESSURE 80-89 MM HG: ICD-10-PCS | Mod: CPTII,S$GLB,, | Performed by: FAMILY MEDICINE

## 2023-02-06 PROCEDURE — 3074F PR MOST RECENT SYSTOLIC BLOOD PRESSURE < 130 MM HG: ICD-10-PCS | Mod: CPTII,S$GLB,, | Performed by: FAMILY MEDICINE

## 2023-02-06 PROCEDURE — 3079F DIAST BP 80-89 MM HG: CPT | Mod: CPTII,S$GLB,, | Performed by: FAMILY MEDICINE

## 2023-02-06 PROCEDURE — 99396 PREV VISIT EST AGE 40-64: CPT | Mod: S$GLB,,, | Performed by: FAMILY MEDICINE

## 2023-02-06 PROCEDURE — 1159F MED LIST DOCD IN RCRD: CPT | Mod: CPTII,S$GLB,, | Performed by: FAMILY MEDICINE

## 2023-02-06 PROCEDURE — 1159F PR MEDICATION LIST DOCUMENTED IN MEDICAL RECORD: ICD-10-PCS | Mod: CPTII,S$GLB,, | Performed by: FAMILY MEDICINE

## 2023-02-06 PROCEDURE — 83036 POCT HEMOGLOBIN A1C: ICD-10-PCS | Mod: QW,,, | Performed by: FAMILY MEDICINE

## 2023-02-06 PROCEDURE — 83036 HEMOGLOBIN GLYCOSYLATED A1C: CPT | Mod: QW,,, | Performed by: FAMILY MEDICINE

## 2023-02-06 PROCEDURE — 3074F SYST BP LT 130 MM HG: CPT | Mod: CPTII,S$GLB,, | Performed by: FAMILY MEDICINE

## 2023-02-06 PROCEDURE — 3008F BODY MASS INDEX DOCD: CPT | Mod: CPTII,S$GLB,, | Performed by: FAMILY MEDICINE

## 2023-02-06 PROCEDURE — 3044F HG A1C LEVEL LT 7.0%: CPT | Mod: CPTII,S$GLB,, | Performed by: FAMILY MEDICINE

## 2023-02-06 PROCEDURE — 99396 PR PREVENTIVE VISIT,EST,40-64: ICD-10-PCS | Mod: S$GLB,,, | Performed by: FAMILY MEDICINE

## 2023-02-06 PROCEDURE — 3008F PR BODY MASS INDEX (BMI) DOCUMENTED: ICD-10-PCS | Mod: CPTII,S$GLB,, | Performed by: FAMILY MEDICINE

## 2023-02-06 PROCEDURE — 3044F PR MOST RECENT HEMOGLOBIN A1C LEVEL <7.0%: ICD-10-PCS | Mod: CPTII,S$GLB,, | Performed by: FAMILY MEDICINE

## 2023-02-06 RX ORDER — ROSUVASTATIN CALCIUM 20 MG/1
20 TABLET, COATED ORAL DAILY
Qty: 90 TABLET | Refills: 3 | Status: SHIPPED | OUTPATIENT
Start: 2023-02-06 | End: 2024-02-16 | Stop reason: SDUPTHER

## 2023-02-06 NOTE — MEDICAL/APP STUDENT
Subjective:       Patient ID: Savanna West is a 62 y.o. female here for check up. She has no complaints today.    Pt has a history of diabetes managed with Ozempic and Metformin. She has lost 13 lbs since her last visit. She reports decreased appetite and a diet consisting of minimal carbohydrates. Her A1C today was 6.2%. She is scheduled to get an eye exam next month.     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 She denies any tobacco, alcohol, or recreational drug use.     She has not seen a GYN and received a pap smear in over 7 years. She reports no history of abnormal paps. She completed a cologuard in 2021 and is due next in 2024. She is currently due for a mammogram. She does not want any new vaccinations at this time.        Chief Complaint: Diabetes (A1c ordered, declined flu vaccine, upcoming Mammogram , need refill, sinus problem, abc )    Diabetes  Pertinent negatives for diabetes include no chest pain and no fatigue.     Review of Systems   Constitutional:  Positive for appetite change. Negative for fatigue and unexpected weight change.   Eyes:  Positive for visual disturbance (floaters in left eye). Negative for pain.   Respiratory:  Negative for chest tightness and shortness of breath.    Cardiovascular:  Negative for chest pain, palpitations and leg swelling.   Gastrointestinal:  Negative for abdominal pain, change in bowel habit and change in bowel habit.   Endocrine: Negative.    Genitourinary:  Negative for bladder incontinence, dysuria, pelvic pain and vaginal bleeding.   Musculoskeletal:  Positive for arthralgias, back pain, neck pain and neck stiffness. Negative for joint swelling.   Integumentary:  Negative for rash and wound.   Allergic/Immunologic: Negative.    Neurological:  Negative for syncope and light-headedness.   Hematological: Negative.    Psychiatric/Behavioral: Negative.         Objective:      Physical Exam  Constitutional:       Appearance:  Normal appearance.   HENT:      Head: Normocephalic and atraumatic.      Right Ear: Tympanic membrane, ear canal and external ear normal.      Left Ear: Tympanic membrane, ear canal and external ear normal.      Nose: Nose normal.      Mouth/Throat:      Mouth: Mucous membranes are moist.      Pharynx: Oropharynx is clear.   Eyes:      Extraocular Movements: Extraocular movements intact.      Conjunctiva/sclera: Conjunctivae normal.      Pupils: Pupils are equal, round, and reactive to light.   Cardiovascular:      Rate and Rhythm: Normal rate and regular rhythm.      Pulses: Normal pulses.      Heart sounds: Normal heart sounds.   Pulmonary:      Effort: Pulmonary effort is normal.      Breath sounds: Normal breath sounds.   Abdominal:      General: Abdomen is flat. Bowel sounds are normal.      Palpations: Abdomen is soft.   Musculoskeletal:         General: Normal range of motion.      Cervical back: Rigidity (rotation limited to 45 degrees either side) present.      Right lower leg: No edema.      Left lower leg: No edema.   Skin:     General: Skin is warm and dry.   Neurological:      General: No focal deficit present.      Mental Status: She is alert and oriented to person, place, and time.      Sensory: No sensory deficit.      Motor: No weakness.      Gait: Gait normal.   Psychiatric:         Mood and Affect: Mood normal.         Behavior: Behavior normal.       Assessment:       Problem List Items Addressed This Visit          Cardiac/Vascular    Mixed hyperlipidemia    Relevant Orders    Hemoglobin A1C, POCT (Completed)         Plan:

## 2023-02-07 ENCOUNTER — TELEPHONE (OUTPATIENT)
Dept: FAMILY MEDICINE | Facility: CLINIC | Age: 63
End: 2023-02-07

## 2023-02-07 LAB
ALBUMIN SERPL-MCNC: 4.3 G/DL (ref 3.6–5.1)
ALBUMIN/GLOB SERPL: 1.8 (CALC) (ref 1–2.5)
ALP SERPL-CCNC: 68 U/L (ref 37–153)
ALT SERPL-CCNC: 14 U/L (ref 6–29)
AST SERPL-CCNC: 14 U/L (ref 10–35)
BASOPHILS # BLD AUTO: 32 CELLS/UL (ref 0–200)
BASOPHILS NFR BLD AUTO: 0.6 %
BILIRUB SERPL-MCNC: 0.4 MG/DL (ref 0.2–1.2)
BUN SERPL-MCNC: 12 MG/DL (ref 7–25)
BUN/CREAT SERPL: NORMAL (CALC) (ref 6–22)
CALCIUM SERPL-MCNC: 9.5 MG/DL (ref 8.6–10.4)
CHLORIDE SERPL-SCNC: 105 MMOL/L (ref 98–110)
CHOLEST SERPL-MCNC: 147 MG/DL
CHOLEST/HDLC SERPL: 2.5 (CALC)
CO2 SERPL-SCNC: 29 MMOL/L (ref 20–32)
CREAT SERPL-MCNC: 0.73 MG/DL (ref 0.5–1.05)
EGFR: 93 ML/MIN/1.73M2
EOSINOPHIL # BLD AUTO: 22 CELLS/UL (ref 15–500)
EOSINOPHIL NFR BLD AUTO: 0.4 %
ERYTHROCYTE [DISTWIDTH] IN BLOOD BY AUTOMATED COUNT: 11.8 % (ref 11–15)
GLOBULIN SER CALC-MCNC: 2.4 G/DL (CALC) (ref 1.9–3.7)
GLUCOSE SERPL-MCNC: 98 MG/DL (ref 65–99)
HCT VFR BLD AUTO: 39.1 % (ref 35–45)
HDLC SERPL-MCNC: 59 MG/DL
HGB BLD-MCNC: 12.8 G/DL (ref 11.7–15.5)
LDLC SERPL CALC-MCNC: 70 MG/DL (CALC)
LYMPHOCYTES # BLD AUTO: 1706 CELLS/UL (ref 850–3900)
LYMPHOCYTES NFR BLD AUTO: 31.6 %
MCH RBC QN AUTO: 30.1 PG (ref 27–33)
MCHC RBC AUTO-ENTMCNC: 32.7 G/DL (ref 32–36)
MCV RBC AUTO: 92 FL (ref 80–100)
MONOCYTES # BLD AUTO: 297 CELLS/UL (ref 200–950)
MONOCYTES NFR BLD AUTO: 5.5 %
NEUTROPHILS # BLD AUTO: 3343 CELLS/UL (ref 1500–7800)
NEUTROPHILS NFR BLD AUTO: 61.9 %
NONHDLC SERPL-MCNC: 88 MG/DL (CALC)
PLATELET # BLD AUTO: 253 THOUSAND/UL (ref 140–400)
PMV BLD REES-ECKER: 10.6 FL (ref 7.5–12.5)
POTASSIUM SERPL-SCNC: 4.2 MMOL/L (ref 3.5–5.3)
PROT SERPL-MCNC: 6.7 G/DL (ref 6.1–8.1)
RBC # BLD AUTO: 4.25 MILLION/UL (ref 3.8–5.1)
SODIUM SERPL-SCNC: 142 MMOL/L (ref 135–146)
TRIGL SERPL-MCNC: 95 MG/DL
WBC # BLD AUTO: 5.4 THOUSAND/UL (ref 3.8–10.8)

## 2023-02-07 NOTE — TELEPHONE ENCOUNTER
----- Message from Anselmo Lugo MD sent at 2/7/2023  2:04 PM CST -----  Call patient.  CBC shows no anemia.  Sugar kidneys and liver are all normal.  Cholesterol excellent at 147 triglycerides 95.  Continue current medications.  In 6 months check A1c CMP lipids urine for microalbumin

## 2023-02-07 NOTE — PROGRESS NOTES
Call patient.  CBC shows no anemia.  Sugar kidneys and liver are all normal.  Cholesterol excellent at 147 triglycerides 95.  Continue current medications.  In 6 months check A1c CMP lipids urine for microalbumin

## 2023-02-07 NOTE — PROGRESS NOTES
SUBJECTIVE:    Patient ID: Savanna West is a 62 y.o. female.    Chief Complaint: Diabetes (A1c ordered, declined flu vaccine, upcoming Mammogram , need refill, sinus problem, abc )    Savanna West is a 62 y.o. female here for check up. She has no complaints today.     Pt has a history of diabetes managed with Ozempic and Metformin. She has lost 13 lbs since her last visit. She reports decreased appetite and a diet consisting of minimal carbohydrates. Her A1C today was 6.2%. She is scheduled to get an eye exam next month.      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 She denies any tobacco, alcohol, or recreational drug use.      She has not seen a GYN and received a pap smear in over 7 years. She reports no history of abnormal paps. She completed a cologuard in 2021 and is due next in 2024. She is currently due for a mammogram. She does not want any new vaccinations at this time.           Chief Complaint: Diabetes (A1c ordered, declined flu vaccine, upcoming Mammogram , need refill, sinus problem, abc )     Diabetes  Pertinent negatives for diabetes include no chest pain and no fatigue.      Review of Systems   Constitutional:  Positive for appetite change. Negative for fatigue and unexpected weight change.   Eyes:  Positive for visual disturbance (floaters in left eye). Negative for pain.   Respiratory:  Negative for chest tightness and shortness of breath.    Cardiovascular:  Negative for chest pain, palpitations and leg swelling.   Gastrointestinal:  Negative for abdominal pain, change in bowel habit and change in bowel habit.   Endocrine: Negative.    Genitourinary:  Negative for bladder incontinence, dysuria, pelvic pain and vaginal bleeding.   Musculoskeletal:  Positive for arthralgias, back pain, neck pain and neck stiffness. Negative for joint swelling.   Integumentary:  Negative for rash and wound.   Allergic/Immunologic: Negative.    Neurological:  Negative for  syncope and light-headedness.   Hematological: Negative.    Psychiatric/Behavioral: Negative.           Objective:  Physical Exam  Constitutional:       Appearance: Normal appearance.   HENT:      Head: Normocephalic and atraumatic.      Right Ear: Tympanic membrane, ear canal and external ear normal.      Left Ear: Tympanic membrane, ear canal and external ear normal.      Nose: Nose normal.      Mouth/Throat:      Mouth: Mucous membranes are moist.      Pharynx: Oropharynx is clear.   Eyes:      Extraocular Movements: Extraocular movements intact.      Conjunctiva/sclera: Conjunctivae normal.      Pupils: Pupils are equal, round, and reactive to light.   Cardiovascular:      Rate and Rhythm: Normal rate and regular rhythm.      Pulses: Normal pulses.      Heart sounds: Normal heart sounds.   Pulmonary:      Effort: Pulmonary effort is normal.      Breath sounds: Normal breath sounds.   Abdominal:      General: Abdomen is flat. Bowel sounds are normal.      Palpations: Abdomen is soft.   Musculoskeletal:         General: Normal range of motion.      Cervical back: Rigidity (rotation limited to 45 degrees either side) present.      Right lower leg: No edema.      Left lower leg: No edema.   Skin:     General: Skin is warm and dry.   Neurological:      General: No focal deficit present.      Mental Status: She is alert and oriented to person, place, and time.      Sensory: No sensory deficit.      Motor: No weakness.      Gait: Gait normal.   Psychiatric:         Mood and Affect: Mood normal.         Behavior: Behavior normal.                           Office Visit on 02/06/2023   Component Date Value Ref Range Status    Hemoglobin A1C, POC 02/06/2023 6.2  % Final   Admission on 09/04/2022, Discharged on 09/04/2022   Component Date Value Ref Range Status    WBC 09/04/2022 13.86 (H)  3.90 - 12.70 K/uL Final    RBC 09/04/2022 4.28  4.00 - 5.40 M/uL Final    Hemoglobin 09/04/2022 13.3  12.0 - 16.0 g/dL Final     Hematocrit 09/04/2022 39.4  37.0 - 48.5 % Final    MCV 09/04/2022 92  82 - 98 fL Final    MCH 09/04/2022 31.1 (H)  27.0 - 31.0 pg Final    MCHC 09/04/2022 33.8  32.0 - 36.0 g/dL Final    RDW 09/04/2022 11.9  11.5 - 14.5 % Final    Platelets 09/04/2022 260  150 - 450 K/uL Final    MPV 09/04/2022 10.2  9.2 - 12.9 fL Final    Immature Granulocytes 09/04/2022 0.4  0.0 - 0.5 % Final    Gran # (ANC) 09/04/2022 11.0 (H)  1.8 - 7.7 K/uL Final    Immature Grans (Abs) 09/04/2022 0.06 (H)  0.00 - 0.04 K/uL Final    Lymph # 09/04/2022 1.9  1.0 - 4.8 K/uL Final    Mono # 09/04/2022 0.8  0.3 - 1.0 K/uL Final    Eos # 09/04/2022 0.1  0.0 - 0.5 K/uL Final    Baso # 09/04/2022 0.06  0.00 - 0.20 K/uL Final    nRBC 09/04/2022 0  0 /100 WBC Final    Gran % 09/04/2022 79.6 (H)  38.0 - 73.0 % Final    Lymph % 09/04/2022 13.7 (L)  18.0 - 48.0 % Final    Mono % 09/04/2022 5.5  4.0 - 15.0 % Final    Eosinophil % 09/04/2022 0.4  0.0 - 8.0 % Final    Basophil % 09/04/2022 0.4  0.0 - 1.9 % Final    Differential Method 09/04/2022 Automated   Final    Sodium 09/04/2022 140  136 - 145 mmol/L Final    Potassium 09/04/2022 4.0  3.5 - 5.1 mmol/L Final    Chloride 09/04/2022 103  95 - 110 mmol/L Final    CO2 09/04/2022 28  23 - 29 mmol/L Final    Glucose 09/04/2022 156 (H)  70 - 110 mg/dL Final    BUN 09/04/2022 21  8 - 23 mg/dL Final    Creatinine 09/04/2022 1.0  0.5 - 1.4 mg/dL Final    Calcium 09/04/2022 10.1  8.7 - 10.5 mg/dL Final    Total Protein 09/04/2022 7.3  6.0 - 8.4 g/dL Final    Albumin 09/04/2022 4.6  3.5 - 5.2 g/dL Final    Total Bilirubin 09/04/2022 0.6  0.1 - 1.0 mg/dL Final    Alkaline Phosphatase 09/04/2022 59  55 - 135 U/L Final    AST 09/04/2022 24  10 - 40 U/L Final    ALT 09/04/2022 24  10 - 44 U/L Final    Anion Gap 09/04/2022 9  8 - 16 mmol/L Final    eGFR 09/04/2022 >60.0  >60 mL/min/1.73 m^2 Final    Specimen UA 09/04/2022 Urine, Clean Catch   Final    Color, UA 09/04/2022 Yellow  Yellow, Straw, Inna Final    Appearance,  UA 09/04/2022 Hazy (A)  Clear Final    pH, UA 09/04/2022 7.0  5.0 - 8.0 Final    Specific Gravity, UA 09/04/2022 1.015  1.005 - 1.030 Final    Protein, UA 09/04/2022 Negative  Negative Final    Glucose, UA 09/04/2022 Negative  Negative Final    Ketones, UA 09/04/2022 Negative  Negative Final    Bilirubin (UA) 09/04/2022 Negative  Negative Final    Occult Blood UA 09/04/2022 3+ (A)  Negative Final    Nitrite, UA 09/04/2022 Negative  Negative Final    Urobilinogen, UA 09/04/2022 Negative  Negative EU/dL Final    Leukocytes, UA 09/04/2022 3+ (A)  Negative Final    RBC, UA 09/04/2022 >100 (H)  0 - 4 /hpf Final    WBC, UA 09/04/2022 30 (H)  0 - 5 /hpf Final    Bacteria 09/04/2022 Negative  None-Occ /hpf Final    Squam Epithel, UA 09/04/2022 5  /hpf Final    Hyaline Casts, UA 09/04/2022 9 (A)  0-1/lpf /lpf Final    Microscopic Comment 09/04/2022 SEE COMMENT   Final    Urine Culture, Routine 09/04/2022 No growth   Final   Admission on 08/13/2022, Discharged on 08/13/2022   Component Date Value Ref Range Status    WBC 08/13/2022 7.56  3.90 - 12.70 K/uL Final    RBC 08/13/2022 3.85 (L)  4.00 - 5.40 M/uL Final    Hemoglobin 08/13/2022 11.8 (L)  12.0 - 16.0 g/dL Final    Hematocrit 08/13/2022 35.1 (L)  37.0 - 48.5 % Final    MCV 08/13/2022 91  82 - 98 fL Final    MCH 08/13/2022 30.6  27.0 - 31.0 pg Final    MCHC 08/13/2022 33.6  32.0 - 36.0 g/dL Final    RDW 08/13/2022 12.0  11.5 - 14.5 % Final    Platelets 08/13/2022 232  150 - 450 K/uL Final    MPV 08/13/2022 10.4  9.2 - 12.9 fL Final    Immature Granulocytes 08/13/2022 0.3  0.0 - 0.5 % Final    Gran # (ANC) 08/13/2022 5.9  1.8 - 7.7 K/uL Final    Immature Grans (Abs) 08/13/2022 0.02  0.00 - 0.04 K/uL Final    Lymph # 08/13/2022 1.3  1.0 - 4.8 K/uL Final    Mono # 08/13/2022 0.4  0.3 - 1.0 K/uL Final    Eos # 08/13/2022 0.0  0.0 - 0.5 K/uL Final    Baso # 08/13/2022 0.03  0.00 - 0.20 K/uL Final    nRBC 08/13/2022 0  0 /100 WBC Final    Gran % 08/13/2022 77.6 (H)  38.0 - 73.0  % Final    Lymph % 08/13/2022 16.8 (L)  18.0 - 48.0 % Final    Mono % 08/13/2022 4.6  4.0 - 15.0 % Final    Eosinophil % 08/13/2022 0.3  0.0 - 8.0 % Final    Basophil % 08/13/2022 0.4  0.0 - 1.9 % Final    Differential Method 08/13/2022 Automated   Final    Sodium 08/13/2022 143  136 - 145 mmol/L Final    Potassium 08/13/2022 3.9  3.5 - 5.1 mmol/L Final    Chloride 08/13/2022 106  95 - 110 mmol/L Final    CO2 08/13/2022 27  23 - 29 mmol/L Final    Glucose 08/13/2022 135 (H)  70 - 110 mg/dL Final    BUN 08/13/2022 18  8 - 23 mg/dL Final    Creatinine 08/13/2022 0.7  0.5 - 1.4 mg/dL Final    Calcium 08/13/2022 9.2  8.7 - 10.5 mg/dL Final    Total Protein 08/13/2022 7.1  6.0 - 8.4 g/dL Final    Albumin 08/13/2022 4.4  3.5 - 5.2 g/dL Final    Total Bilirubin 08/13/2022 0.4  0.1 - 1.0 mg/dL Final    Alkaline Phosphatase 08/13/2022 54 (L)  55 - 135 U/L Final    AST 08/13/2022 19  10 - 40 U/L Final    ALT 08/13/2022 23  10 - 44 U/L Final    Anion Gap 08/13/2022 10  8 - 16 mmol/L Final    eGFR 08/13/2022 >60.0  >60 mL/min/1.73 m^2 Final    Specimen UA 08/13/2022 Urine, Clean Catch   Final    Color, UA 08/13/2022 Yellow  Yellow, Straw, Inna Final    Appearance, UA 08/13/2022 Hazy (A)  Clear Final    pH, UA 08/13/2022 7.0  5.0 - 8.0 Final    Specific Gravity, UA 08/13/2022 1.015  1.005 - 1.030 Final    Protein, UA 08/13/2022 Trace (A)  Negative Final    Glucose, UA 08/13/2022 Negative  Negative Final    Ketones, UA 08/13/2022 Negative  Negative Final    Bilirubin (UA) 08/13/2022 1+ (A)  Negative Final    Occult Blood UA 08/13/2022 2+ (A)  Negative Final    Nitrite, UA 08/13/2022 Positive (A)  Negative Final    Urobilinogen, UA 08/13/2022 2.0-3.0 (A)  Negative EU/dL Final    Leukocytes, UA 08/13/2022 1+ (A)  Negative Final    Magnesium 08/13/2022 1.7  1.6 - 2.6 mg/dL Final    Lipase 08/13/2022 38  4 - 60 U/L Final    Troponin I 08/13/2022 <0.030  <=0.040 ng/mL Final    RBC, UA 08/13/2022 46 (H)  0 - 4 /hpf Final    WBC, UA  2022 9 (H)  0 - 5 /hpf Final    Bacteria 2022 Negative  None-Occ /hpf Final    Squam Epithel, UA 2022 3  /hpf Final    Hyaline Casts, UA 2022 30 (A)  0-1/lpf /lpf Final    Ca Oxalate Tiffany, UA 2022 Few  None-Moderate Final    Microscopic Comment 2022 SEE COMMENT   Final       Past Medical History:   Diagnosis Date    Diabetes mellitus     DVT (deep venous thrombosis)      Social History     Socioeconomic History    Marital status:    Tobacco Use    Smoking status: Never    Smokeless tobacco: Never   Substance and Sexual Activity    Alcohol use: No    Drug use: No    Sexual activity: Yes     Partners: Male     Birth control/protection: Implant     Past Surgical History:   Procedure Laterality Date    BREAST BIOPSY Right 1998     SECTION      GALLBLADDER SURGERY  2011    KIDNEY STONE SURGERY  2006    MD DILATION/CURETTAGE,DIAGNOSTIC      D & C times 2    SHOULDER SURGERY Right 2010    Reconstruction Dr. Madrigal    TONSILLECTOMY  1964     Family History   Problem Relation Age of Onset    Diabetes Mother     Heart disease Father        Review of patient's allergies indicates:   Allergen Reactions    Opioids - morphine analogues     Codeine Nausea And Vomiting       Current Outpatient Medications:     aspirin (ECOTRIN) 81 MG EC tablet, Take 81 mg by mouth once daily., Disp: , Rfl:     Bifidobacterium infantis (ALIGN) 10.5 mg (10 million cell) Chew, Take 1 tablet by mouth once daily., Disp: , Rfl:     blood sugar diagnostic Strp, 60 strips by Misc.(Non-Drug; Combo Route) route 2 (two) times a day., Disp: 60 strip, Rfl: 0    cefUROXime (CEFTIN) 500 MG tablet, Take 1 tablet (500 mg total) by mouth every 12 (twelve) hours., Disp: 20 tablet, Rfl: 0    cholecalciferol, vitamin D3, (VITAMIN D3) 50 mcg (2,000 unit) Tab, Take 1 tablet by mouth once daily. , Disp: , Rfl:     cyanocobalamin-cobamamide 5,000-100 mcg Lozg, Place 1 lozenge under the tongue once daily. , Disp:  , Rfl:     diphenhydrAMINE (BENADRYL) 25 mg capsule, Take 25 mg by mouth every 6 (six) hours as needed for Itching., Disp: , Rfl:     fluconazole (DIFLUCAN) 150 MG Tab, Take one tab PO at onset of symptoms. Take additional tablet PO on day 3 if symptoms persist., Disp: 2 tablet, Rfl: 0    HYDROcodone-acetaminophen (NORCO) 5-325 mg per tablet, Take 1 tablet by mouth every 6 (six) hours as needed for Pain., Disp: 12 tablet, Rfl: 0    ibuprofen (ADVIL,MOTRIN) 400 MG tablet, Take 1 tablet (400 mg total) by mouth every 6 (six) hours as needed., Disp: 20 tablet, Rfl: 0    magnesium 30 mg Tab, Take 30 mg by mouth once. , Disp: , Rfl:     multivitamin with minerals (HAIR,SKIN AND NAILS ORAL), Take 1 tablet by mouth once daily. , Disp: , Rfl:     mupirocin (BACTROBAN) 2 % ointment, Apply topically 2 (two) times daily. Apply to affected area, Disp: , Rfl:     mupirocin (BACTROBAN) 2 % ointment, Apply topically 3 (three) times daily., Disp: 22 g, Rfl: 1    naproxen (NAPROSYN) 500 MG tablet, Take 1 tablet (500 mg total) by mouth 2 (two) times daily with meals., Disp: 15 tablet, Rfl: 0    ondansetron (ZOFRAN) 4 MG tablet, Take 1 tablet (4 mg total) by mouth every 8 (eight) hours as needed for Nausea., Disp: 15 tablet, Rfl: 0    semaglutide (OZEMPIC) 0.25 mg or 0.5 mg(2 mg/1.5 mL) pen injector, Inject 0.5 mg into the skin every 7 days., Disp: , Rfl:     tamsulosin (FLOMAX) 0.4 mg Cap, Take 1 capsule (0.4 mg total) by mouth once daily., Disp: 10 capsule, Rfl: 0    vitamin E 400 UNIT capsule, Take 400 Units by mouth once daily., Disp: , Rfl:     zinc sulfate (ZINC-15 ORAL), Take 1 tablet by mouth once daily. , Disp: , Rfl:     blood-glucose meter kit, Use as instructed, Disp: 1 each, Rfl: 0    metFORMIN (GLUCOPHAGE) 500 MG tablet, Take 2 tablets (1,000 mg total) by mouth 2 (two) times daily with meals., Disp: 360 tablet, Rfl: 3    rosuvastatin (CRESTOR) 20 MG tablet, Take 1 tablet (20 mg total) by mouth once daily., Disp: 90  "tablet, Rfl: 3    Review of Systems       Objective:      Vitals:    02/06/23 1346   BP: 128/80   Pulse: 77   Weight: 80.3 kg (177 lb)   Height: 5' 8" (1.727 m)     Physical Exam      Assessment:       1. Wellness examination    2. Mixed hyperlipidemia    3. Diabetes mellitus due to underlying condition with hyperglycemia, without long-term current use of insulin    4. Kidney stones           Plan:       Diabetes mellitus due to underlying condition with hyperglycemia, without long-term current use of insulin  Diabetes doing well with A1c of 6.2.  Continue current medications  Mixed hyperlipidemia     Orders:  -     Hemoglobin A1C, POCT  -     rosuvastatin (CRESTOR) 20 MG tablet; Take 1 tablet (20 mg total) by mouth once daily.  Dispense: 90 tablet; Refill: 3  -     CBC Auto Differential; Future; Expected date: 02/06/2023  -     Comprehensive Metabolic Panel; Future; Expected date: 02/06/2023  -     Lipid Panel; Future; Expected date: 02/06/2023  Cholesterol 98 HDL 41 LDL 42 TG 73, excellent lipid panel  Kidney stones  She seemed to passed the kidney stones recently September  Refer to gyn Dr. Tushar Park for Pap and pelvic every 3-5 years  Follow up in about 6 months (around 8/6/2023).        2/6/2023 Anselmo Lugo      "

## 2023-03-22 ENCOUNTER — TELEPHONE (OUTPATIENT)
Dept: OPTOMETRY | Facility: CLINIC | Age: 63
End: 2023-03-22
Payer: COMMERCIAL

## 2023-03-22 NOTE — TELEPHONE ENCOUNTER
----- Message from Molly Banerjee sent at 3/22/2023 10:57 AM CDT -----  Contact: Pt  Type:  Sooner Appointment Request    Caller is requesting a sooner appointment.  Caller declined first available appointment listed below.  Caller will not accept being placed on the waitlist and is requesting a message be sent to doctor.    Name of Caller:  Pt  When is the first available appointment?  05/2023  Symptoms:  Diabetic Eye Exam  Best Call Back Number:  [phone]    Additional Information:  Pt had to cancel appt due to work wanted to see if they would be able to get squeezed in sometime before May pt stated there insurance will be gone by the time May comes please call if they can get in sooner Thank you

## 2023-04-05 ENCOUNTER — OFFICE VISIT (OUTPATIENT)
Dept: OPTOMETRY | Facility: CLINIC | Age: 63
End: 2023-04-05
Payer: COMMERCIAL

## 2023-04-05 DIAGNOSIS — H25.13 NUCLEAR SCLEROSIS, BILATERAL: ICD-10-CM

## 2023-04-05 DIAGNOSIS — H43.812 POSTERIOR VITREOUS DETACHMENT OF LEFT EYE: ICD-10-CM

## 2023-04-05 DIAGNOSIS — Z01.00 EXAMINATION OF EYES AND VISION: Primary | ICD-10-CM

## 2023-04-05 DIAGNOSIS — H52.7 REFRACTIVE ERROR: ICD-10-CM

## 2023-04-05 DIAGNOSIS — E11.9 DIABETES MELLITUS TYPE 2 WITHOUT RETINOPATHY: ICD-10-CM

## 2023-04-05 PROCEDURE — 92014 PR EYE EXAM, EST PATIENT,COMPREHESV: ICD-10-PCS | Mod: S$GLB,,, | Performed by: OPTOMETRIST

## 2023-04-05 PROCEDURE — 99999 PR PBB SHADOW E&M-EST. PATIENT-LVL III: ICD-10-PCS | Mod: PBBFAC,,, | Performed by: OPTOMETRIST

## 2023-04-05 PROCEDURE — 92015 PR REFRACTION: ICD-10-PCS | Mod: S$GLB,,, | Performed by: OPTOMETRIST

## 2023-04-05 PROCEDURE — 99999 PR PBB SHADOW E&M-EST. PATIENT-LVL III: CPT | Mod: PBBFAC,,, | Performed by: OPTOMETRIST

## 2023-04-05 PROCEDURE — 92014 COMPRE OPH EXAM EST PT 1/>: CPT | Mod: S$GLB,,, | Performed by: OPTOMETRIST

## 2023-04-05 PROCEDURE — 92015 DETERMINE REFRACTIVE STATE: CPT | Mod: S$GLB,,, | Performed by: OPTOMETRIST

## 2023-04-05 NOTE — PROGRESS NOTES
HPI    Pt here today for annual DM exam.  States slight decreased vision at both   near & distance.       Pt would like separate rx's for SVL for computer/intermediate & distance.    Denies any headaches or eye pain.    Hemoglobin A1C       Date                     Value               Ref Range             Status                12/28/2020               9.3 (H)             4.5 - 6.2 %           Final                 11/21/2020               9.5 (H)             4.5 - 6.2 %           Final              BSL stays in 130s.    (-) allergies / dry eyes  (-) gtts  (+) floaters OS only -- no light flashes    Last edited by Timothy Herrera, OD on 4/5/2023  4:30 PM.            Assessment /Plan     For exam results, see Encounter Report.    Examination of eyes and vision    Nuclear sclerosis, bilateral    Refractive error    Posterior vitreous detachment of left eye    Diabetes mellitus type 2 without retinopathy      1. Examination of eyes and vision    2. Nuclear sclerosis, bilateral  Mild, not yet significant. Discussed possible ocular affects of cataracts. Acceptable BCVA OU. Discussed treatment options. Surgery not recommended at this time. Monitor yearly.     3. Refractive error  Dispensed updated spectacle Rx. Discussed various spectacle lens options. Discussed adaptation period to new specs.   Demonstrated new spec Rx vs current specs in phoropter with patient satisfaction    4. Posterior vitreous detachment of left eye  Stable floater, neg photopsia  RTC immediately if any flashes, worsening floaters, decreased vision, veiling of vision, or trauma occurs.    5. DM type 2 w/o retinopathy  Discussed possible ocular affects of uncontrolled blood sugar with patient. Recommended continued strong blood sugar control and continued care with PCP. Monitor yearly.

## 2023-04-13 DIAGNOSIS — L08.9 SKIN INFECTION: ICD-10-CM

## 2023-04-13 DIAGNOSIS — E08.65 DIABETES MELLITUS DUE TO UNDERLYING CONDITION WITH HYPERGLYCEMIA, WITHOUT LONG-TERM CURRENT USE OF INSULIN: ICD-10-CM

## 2023-04-13 RX ORDER — METFORMIN HYDROCHLORIDE 500 MG/1
1000 TABLET ORAL 2 TIMES DAILY WITH MEALS
Qty: 360 TABLET | Refills: 3 | Status: SHIPPED | OUTPATIENT
Start: 2023-04-13 | End: 2024-04-12

## 2023-04-13 RX ORDER — MUPIROCIN 20 MG/G
OINTMENT TOPICAL 3 TIMES DAILY
Qty: 22 G | Refills: 1 | Status: SHIPPED | OUTPATIENT
Start: 2023-04-13

## 2023-04-13 NOTE — TELEPHONE ENCOUNTER
----- Message from Kellen Parekh sent at 4/13/2023  2:17 PM CDT -----  - pt needs refill on her medicine   [phone]

## 2023-04-25 ENCOUNTER — TELEPHONE (OUTPATIENT)
Dept: FAMILY MEDICINE | Facility: CLINIC | Age: 63
End: 2023-04-25

## 2023-04-25 NOTE — TELEPHONE ENCOUNTER
----- Message from Jessica Cam MA sent at 4/25/2023  3:25 PM CDT -----  Regarding: ozempic needs auth  Patient needs ozempic authorized by her insurance co.  [facility]  which expires on 4/30/2023  [facility]  [phone]

## 2023-06-27 ENCOUNTER — TELEPHONE (OUTPATIENT)
Dept: FAMILY MEDICINE | Facility: CLINIC | Age: 63
End: 2023-06-27

## 2023-06-27 NOTE — TELEPHONE ENCOUNTER
Okay per Dr. Lugo to see tomorrow morning at 7:40am. Spoke with patient and got her scheduled. Will have a full bladder so we can get a ua first

## 2023-06-27 NOTE — TELEPHONE ENCOUNTER
Pt states she is calling for 2 things    Pt states she has a UTI and she is taking old rx of Ceftin  from 9/4/22 and Azo. Pt states there is something wrong with her right kidney, states she went to wipe herself and when she bent she felt a pain in her right kidney and swears there is something wrong. Pt wants to come in tomorrow. States she really needs to be seen. Denies any blood with urination, but she is taking AZO so urine color is off. Pt states the AZO is helping the burning from the UTI, but it is not resolved.

## 2023-06-27 NOTE — TELEPHONE ENCOUNTER
----- Message from Cheng Del Castillo MA sent at 6/27/2023  2:12 PM CDT -----  Regarding: call back request  Pt calling to discuss a kidney issue and one of her medications that have been prescribed to her.  [phone]

## 2023-08-04 ENCOUNTER — TELEPHONE (OUTPATIENT)
Dept: UROLOGY | Facility: CLINIC | Age: 63
End: 2023-08-04
Payer: COMMERCIAL

## 2023-08-04 ENCOUNTER — NURSE TRIAGE (OUTPATIENT)
Dept: ADMINISTRATIVE | Facility: CLINIC | Age: 63
End: 2023-08-04
Payer: COMMERCIAL

## 2023-08-04 ENCOUNTER — TELEPHONE (OUTPATIENT)
Dept: FAMILY MEDICINE | Facility: CLINIC | Age: 63
End: 2023-08-04

## 2023-08-04 NOTE — TELEPHONE ENCOUNTER
----- Message from Alyssa Jason sent at 8/4/2023  1:46 PM CDT -----  Name of Who is Calling: pt        What is the request in detail:  pt stated she is not feeling well. Mentioned it could be kidney stones. Pt would be new and is open to seeing anyone in office. Please assist with this matter        Can the clinic reply by [facility]:  no        What Number to Call Back if not in [facility]: [phone] (home)

## 2023-08-04 NOTE — TELEPHONE ENCOUNTER
Spoke w pt voiced that she is having a kidney stone episode pain  with possible uti pt was informed to proceed to ER for further eval   Pt vu

## 2023-08-04 NOTE — TELEPHONE ENCOUNTER
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

## 2023-08-04 NOTE — TELEPHONE ENCOUNTER
"Pt transferred to me. Pt called in stating she has had hx kidney stones. Today pt reports dizziness, nausea, flank/back pain R>L. States current episode feels like a kidney stone similar to ones she has had in the past. States UTI symptoms and body aches began yesterday. Unsure if she has blood in urine as she started taking AZO yesterday. Pt states she is looking for advice as she is trying to avoid going to the ED. Pt advised that per records she has already been advised by urology to go to ED for eval. Pt states "they offered to transfer me to triage to be sure." Pt states she breaks out into a sweat and feels like she is going to pass out when she tries to get up. Care advice per protocol. Pt declines 911 dispo. States she does not think she needs an ambulance. Pt advised based on reported symptoms 911 EMS is recommended as a safety measure so that she does not hurt herself or someone else if she falls when she tries to stand or move. Pt states she understands. Pt states she will get a ride to ED now. PT advised if she feels at any point like she is going to pass out, call 911. Pt verbalized understanding.   Reason for Disposition   Shock suspected (e.g., cold/pale/clammy skin, too weak to stand, low BP, rapid pulse)    Additional Information   Negative: Passed out (i.e., lost consciousness, collapsed and was not responding)    Protocols used: Flank Pain-A-[place]    "

## 2023-08-04 NOTE — TELEPHONE ENCOUNTER
Spoke with patient, she would like an appt next week some time for kidney stone eval. Mon offered, patient declind, patient scheduled on Wed. With NP

## 2023-08-04 NOTE — TELEPHONE ENCOUNTER
----- Message from Rosa Salcedo sent at 8/4/2023 12:01 PM CDT -----  Type: Needs Medical Advice  Who Called:  pt  Symptoms (please be specific):  pt said she need to speak to the office about a kidney stone blockage and UTI said she in pain--said she can not go the weekend like this--please call and advise  Best Call Back Number: [phone] (home)     Additional Information: thank you

## 2023-08-09 ENCOUNTER — OFFICE VISIT (OUTPATIENT)
Dept: UROLOGY | Facility: CLINIC | Age: 63
End: 2023-08-09
Payer: COMMERCIAL

## 2023-08-09 ENCOUNTER — TELEPHONE (OUTPATIENT)
Dept: FAMILY MEDICINE | Facility: CLINIC | Age: 63
End: 2023-08-09

## 2023-08-09 DIAGNOSIS — E78.2 MIXED HYPERLIPIDEMIA: ICD-10-CM

## 2023-08-09 DIAGNOSIS — R10.9 RIGHT FLANK PAIN: Primary | ICD-10-CM

## 2023-08-09 DIAGNOSIS — E08.65 DIABETES MELLITUS DUE TO UNDERLYING CONDITION WITH HYPERGLYCEMIA, WITHOUT LONG-TERM CURRENT USE OF INSULIN: ICD-10-CM

## 2023-08-09 DIAGNOSIS — Z79.899 ENCOUNTER FOR LONG-TERM (CURRENT) USE OF OTHER MEDICATIONS: ICD-10-CM

## 2023-08-09 DIAGNOSIS — N20.0 KIDNEY STONES: ICD-10-CM

## 2023-08-09 DIAGNOSIS — E66.3 OVERWEIGHT WITH BODY MASS INDEX (BMI) 25.0-29.9: ICD-10-CM

## 2023-08-09 DIAGNOSIS — Z00.00 WELLNESS EXAMINATION: Primary | ICD-10-CM

## 2023-08-09 LAB
MICROSCOPIC COMMENT: ABNORMAL
NON-SQ EPI CELLS #/AREA URNS AUTO: 1 /HPF
RBC #/AREA URNS AUTO: 2 /HPF (ref 0–4)
SQUAMOUS #/AREA URNS AUTO: 4 /HPF
WBC #/AREA URNS AUTO: 33 /HPF (ref 0–5)

## 2023-08-09 PROCEDURE — 99999 PR PBB SHADOW E&M-EST. PATIENT-LVL IV: CPT | Mod: PBBFAC,,, | Performed by: NURSE PRACTITIONER

## 2023-08-09 PROCEDURE — 3044F HG A1C LEVEL LT 7.0%: CPT | Mod: CPTII,S$GLB,, | Performed by: NURSE PRACTITIONER

## 2023-08-09 PROCEDURE — 1160F RVW MEDS BY RX/DR IN RCRD: CPT | Mod: CPTII,S$GLB,, | Performed by: NURSE PRACTITIONER

## 2023-08-09 PROCEDURE — 81001 URINALYSIS AUTO W/SCOPE: CPT | Performed by: NURSE PRACTITIONER

## 2023-08-09 PROCEDURE — 1160F PR REVIEW ALL MEDS BY PRESCRIBER/CLIN PHARMACIST DOCUMENTED: ICD-10-PCS | Mod: CPTII,S$GLB,, | Performed by: NURSE PRACTITIONER

## 2023-08-09 PROCEDURE — 99214 PR OFFICE/OUTPT VISIT, EST, LEVL IV, 30-39 MIN: ICD-10-PCS | Mod: S$GLB,,, | Performed by: NURSE PRACTITIONER

## 2023-08-09 PROCEDURE — 3044F PR MOST RECENT HEMOGLOBIN A1C LEVEL <7.0%: ICD-10-PCS | Mod: CPTII,S$GLB,, | Performed by: NURSE PRACTITIONER

## 2023-08-09 PROCEDURE — 99214 OFFICE O/P EST MOD 30 MIN: CPT | Mod: S$GLB,,, | Performed by: NURSE PRACTITIONER

## 2023-08-09 PROCEDURE — 87086 URINE CULTURE/COLONY COUNT: CPT | Performed by: NURSE PRACTITIONER

## 2023-08-09 PROCEDURE — 1159F PR MEDICATION LIST DOCUMENTED IN MEDICAL RECORD: ICD-10-PCS | Mod: CPTII,S$GLB,, | Performed by: NURSE PRACTITIONER

## 2023-08-09 PROCEDURE — 99999 PR PBB SHADOW E&M-EST. PATIENT-LVL IV: ICD-10-PCS | Mod: PBBFAC,,, | Performed by: NURSE PRACTITIONER

## 2023-08-09 PROCEDURE — 1159F MED LIST DOCD IN RCRD: CPT | Mod: CPTII,S$GLB,, | Performed by: NURSE PRACTITIONER

## 2023-08-09 NOTE — PROGRESS NOTES
"CHIEF COMPLAINT:    Mrs West is a 63 y.o. female presenting for kidney stones.  PRESENTING ILLNESS:    Savanna West is a 63 y.o. female who presents for kidney stones. Last clinic visit was 4/28/21 with Dr. Valencia    8/9/23  Patient presents for right flank/back pain that started last week. She called 8/4/23 with c/o chills, "flu like" /UTI symptoms, and flank pain and was instructed to go to ED but did not go. Today she does feel better but continues with flank/back pain and urinary frequency/urgency. She is taking AZO. Denies gross hematuria. No fever.     Most recent imaging 9/4/2022 CT RSS  There is mild left hydroureteronephrosis secondary to a 3 mm left ureteral vesicle junction stone. Previously the stone was in the left ureteropelvic junction. There is a 2 mm stone in the mid left kidney. There is mild perinephric stranding.  No right renal stones     4/27/21  Patient presented to the ED on 3/19 with complaints of dark urine and bilateral flank pain. CTRSS at that time negative for obstructing stone. Urine culture grew E coli.   Does not get recurrent UTIs.      She has a punctate left renal stone which appears to be within the parenchyma.   Also has hx of prior stones and has required stent placement and ESWL in 2005. No stone episodes since then.      Drinks 40 oz of water, 1 glass of iced tea, coffee in the am.      No urinary symptoms today.   No hematuria.     Urine cultures:   Lab Results   Component Value Date    LABURIN No growth 09/04/2022    LABURIN (A) 04/19/2022      Comment:          CULTURE, URINE, ROUTINE         Micro Number:      [id number]    Test Status:       Final    Specimen Source:   Urine, clean catch    Specimen Quality:  Adequate    Result:            Greater than 100,000 CFU/mL of Escherichia coli (ESBL)                              E.coli (ESBL)                            ----------------                            INT   ARIANA     AMOX/CLAVULANATE       I     16     AMPICILLIN    "          R     >=32 **1     AMP/SULBACTAM          R     >=32     CEFAZOLIN              R     >=64 **2     CEFEPIME               I     8     CEFTRIAXONE            R     >=64     CIPROFLOXACIN          R     >=4     GENTAMICIN             S     <=1     IMIPENEM               S     <=0.25     LEVOFLOXACIN           R     >=8     NITROFURANTOIN         S     <=16     PIP/TAZOBACTAM         S     <=4     TOBRAMYCIN             S     <=1     TRIMETHOPRIM/SULFA     S     <=20     ESBL RESULT:           *   **3    S=Susceptible  I=Intermediate  R=Resistant  * = Not Tested  NR = Not Reported  **NN = See Therapy Comments      THERAPY COMMENTS        Note 1:      Extended spectrum beta-lactamase (ESBL) producing      organisms demonstrate decreased activity with      penicillins, cephalosporins and aztreonam.        Note 2:      For uncomplicated UTI caused by E. coli,      K. pneumoniae or P. mirabilis: Cefazolin is      susceptible if ARIANA <32 mcg/mL and predicts      susceptible to the oral agents cefaclor, cefdinir,      cefpodoxime, cefprozil, cefuroxime, cephalexin      and loracarbef.        Note 3:             The organism has been confirmed as an ESBL       .      LUIS ([facility]) 11/10/2021      Comment:          CULTURE, URINE, ROUTINE         Micro Number:      [id number]    Test Status:       Final    Specimen Source:   Urine, clean catch    Specimen Quality:  Adequate    Result:            50,000-100,000 CFU/mL of Enterococcus faecalis                              E.faecalis                            ----------------                            INT   ARIANA     AMPICILLIN             S     <=2     CIPROFLOXACIN          R     >=8     LEVOFLOXACIN           R     >=8     NITROFURANTOIN         S     <=16     TETRACYCLINE           R     >=16     VANCOMYCIN             S     1    S=Susceptible  I=Intermediate  R=Resistant  * = Not Tested  NR = Not Reported  **NN = See Therapy Comments        [facility]IS ([facility]) 07/27/2021       Comment:          CULTURE, URINE, ROUTINE         Micro Number:      [id number]    Test Status:       Final    Specimen Source:   Urine    Specimen Quality:  Adequate    Result:            Greater than 100,000 CFU/mL of Klebsiella pneumoniae                              K.pneumoniae                            ----------------                            INT   ARIANA     AMOX/CLAVULANATE       S     8     AMPICILLIN             R     16     AMP/SULBACTAM          S     4     CEFAZOLIN              NR    <=4 **2     CEFEPIME               S     <=1     CEFTRIAXONE            S     <=1     CIPROFLOXACIN          S     <=0.25     GENTAMICIN             S     <=1     IMIPENEM               S     <=0.25     LEVOFLOXACIN           S     <=0.12     NITROFURANTOIN         S     <=16     PIP/TAZOBACTAM         S     <=4     TOBRAMYCIN             S     <=1     TRIMETHOPRIM/SULFA     S     <=20    S=Susceptible  I=Intermediate  R=Resistant  * = Not Tested  NR = Not Reported  **NN = See Therapy Comments      THERAPY COMMENTS        Note 1:      For infections other than uncomplicated UTI      caused by E. coli, K. pneumoniae or P. mirabilis:      Cefazolin is resistant if ARIANA > or = 8 mcg/mL.      (Distinguishing susceptible versus intermediate      for isolates with ARIANA < or = 4 mcg/mL requires      additional testing.)        Note 2:      For uncomplicated UTI caused by E. coli,      K. pneumoniae or P. mirabilis: Cefazolin is      susceptible if ARIANA <32 mcg/mL and predicts      susceptible to the oral agents cefaclor, cefdinir,      cefpodoxime, cefprozil, cefuroxime, cephalexin      and loracarbef.      LABURIN ESCHERICHIA COLI  >100,000 cfu/ml   (A) 05/24/2021    LABURIN ESCHERICHIA COLI  50,000 - 99,999 cfu/ml   (A) 04/19/2021    LABURIN  04/24/2006     MULTIPLE ORGANISMS ISOLATED. NONE IN PREDOMINANCE REPEAT IF CLINICALLY NECESSARY.    LABURIN  01/14/2006     MULTIPLE ORGANISMS ISOLATED. NONE IN PREDOMINANCE REPEAT IF  CLINICALLY NECESSARY.     REVIEW OF SYSTEMS:    Review of Systems    Constitutional: Negative for fever   HENT: Negative for hearing loss.   Eyes: Negative for visual disturbance.   Respiratory: Negative for shortness of breath.   Cardiovascular: Negative for chest pain.   Gastrointestinal: Negative for nausea, vomiting.   Genitourinary:  See above  Neurological: Negative for dizziness.   Hematological: Does not bruise/bleed easily.   Psychiatric/Behavioral: Negative for confusion.     PATIENT HISTORY:    Past Medical History:   Diagnosis Date    Diabetes mellitus     DVT (deep venous thrombosis)        Past Surgical History:   Procedure Laterality Date    BREAST BIOPSY Right      SECTION      GALLBLADDER SURGERY  2011    KIDNEY STONE SURGERY  2006    [facility] DILATION/CURETTAGE,DIAGNOSTIC      D & C times 2    SHOULDER SURGERY Right 2010    Reconstruction Dr. Madrigal    TONSILLECTOMY  1964       Family History   Problem Relation Age of Onset    Diabetes Mother     Heart disease Father        Social History     Socioeconomic History    Marital status:    Tobacco Use    Smoking status: Never    Smokeless tobacco: Never   Substance and Sexual Activity    Alcohol use: No    Drug use: No    Sexual activity: Yes     Partners: Male     Birth control/protection: Implant     Social Determinants of Health     Physical Activity: Sufficiently Active (2020)    Exercise Vital Sign     Days of Exercise per Week: 6 days     Minutes of Exercise per Session: 150+ min   Stress: No Stress Concern Present (2020)    [employer] - Occupational Stress Questionnaire     Feeling of Stress : Only a little   Social Connections: Unknown (2020)    Social Connection and Isolation Panel [NHANES]     Frequency of Communication with Friends and Family: More than three times a week     Frequency of Social Gatherings with Friends and Family: More than three times a week     Active  Member of Clubs or Organizations: No     Attends Club or Organization Meetings: Never     Marital Status: Patient refused       Allergies:  Opioids - morphine analogues and Codeine    Medications:    Current Outpatient Medications:     aspirin (ECOTRIN) 81 MG EC tablet, Take 81 mg by mouth once daily., Disp: , Rfl:     Bifidobacterium infantis (ALIGN) 10.5 mg (10 million cell) Chew, Take 1 tablet by mouth once daily., Disp: , Rfl:     blood sugar diagnostic Strp, 60 strips by Misc.(Non-Drug; Combo Route) route 2 (two) times a day., Disp: 60 strip, Rfl: 0    cefUROXime (CEFTIN) 500 MG tablet, Take 1 tablet (500 mg total) by mouth every 12 (twelve) hours., Disp: 20 tablet, Rfl: 0    cholecalciferol, vitamin D3, (VITAMIN D3) 50 mcg (2,000 unit) Tab, Take 1 tablet by mouth once daily. , Disp: , Rfl:     cyanocobalamin-cobamamide 5,000-100 mcg Lozg, Place 1 lozenge under the tongue once daily. , Disp: , Rfl:     diphenhydrAMINE (BENADRYL) 25 mg capsule, Take 25 mg by mouth every 6 (six) hours as needed for Itching., Disp: , Rfl:     fluconazole (DIFLUCAN) 150 MG Tab, Take one tab PO at onset of symptoms. Take additional tablet PO on day 3 if symptoms persist., Disp: 2 tablet, Rfl: 0    HYDROcodone-acetaminophen (NORCO) 5-325 mg per tablet, Take 1 tablet by mouth every 6 (six) hours as needed for Pain., Disp: 12 tablet, Rfl: 0    ibuprofen (ADVIL,MOTRIN) 400 MG tablet, Take 1 tablet (400 mg total) by mouth every 6 (six) hours as needed., Disp: 20 tablet, Rfl: 0    magnesium 30 mg Tab, Take 30 mg by mouth once. , Disp: , Rfl:     metFORMIN (GLUCOPHAGE) 500 MG tablet, Take 2 tablets (1,000 mg total) by mouth 2 (two) times daily with meals., Disp: 360 tablet, Rfl: 3    multivitamin with minerals (HAIR,SKIN AND NAILS ORAL), Take 1 tablet by mouth once daily. , Disp: , Rfl:     mupirocin (BACTROBAN) 2 % ointment, Apply topically 2 (two) times daily. Apply to affected area, Disp: , Rfl:     mupirocin (BACTROBAN) 2 % ointment,  Apply topically 3 (three) times daily., Disp: 22 g, Rfl: 1    naproxen (NAPROSYN) 500 MG tablet, Take 1 tablet (500 mg total) by mouth 2 (two) times daily with meals., Disp: 15 tablet, Rfl: 0    ondansetron (ZOFRAN) 4 MG tablet, Take 1 tablet (4 mg total) by mouth every 8 (eight) hours as needed for Nausea., Disp: 15 tablet, Rfl: 0    rosuvastatin (CRESTOR) 20 MG tablet, Take 1 tablet (20 mg total) by mouth once daily., Disp: 90 tablet, Rfl: 3    semaglutide (OZEMPIC) 0.25 mg or 0.5 mg(2 mg/1.5 mL) pen injector, Inject 0.5 mg into the skin every 7 days., Disp: 1 each, Rfl: 5    tamsulosin (FLOMAX) 0.4 mg Cap, Take 1 capsule (0.4 mg total) by mouth once daily., Disp: 10 capsule, Rfl: 0    vitamin E 400 UNIT capsule, Take 400 Units by mouth once daily., Disp: , Rfl:     zinc sulfate (ZINC-15 ORAL), Take 1 tablet by mouth once daily. , Disp: , Rfl:     blood-glucose meter kit, Use as instructed, Disp: 1 each, Rfl: 0    PHYSICAL EXAMINATION:    Constitutional: She is oriented to person, place, and time. She appears well-developed and well-nourished.  She is in no apparent distress.    Neck: Normal ROM.     Cardiovascular: Normal rate.      Pulmonary/Chest: Effort normal. No respiratory distress.     Abdominal:  She exhibits no distension.      Neurological: She is alert and oriented to person, place, and time.     Skin: Skin is warm and dry.     Psych: Cooperative with normal affect.    Physical Exam      LABS:    Lab Results   Component Value Date    CREATININE 0.73 02/06/2023       IMPRESSION:    Encounter Diagnoses   Name Primary?    Right flank pain Yes    Kidney stones        PLAN:  -Urine sent for micro UA and culture. Will treat based on results    -CT RSS ordered and scheduled. Will call with results    -Good water intake to maintain proper hydration    -Recommended to go to ED if symptoms worsen (fever, chills, nausea, vomiting, gross hematuria).     -RTC based on results    I encouraged her or any of her  family members to call or email me with questions and/or concerns.      30 minutes of total time spent on the encounter, which includes face to face time and non-face to face time preparing to see the patient (eg, review of tests), Obtaining and/or reviewing separately obtained history, Documenting clinical information in the electronic or other health record, Independently interpreting results (not separately reported) and communicating results to the patient/family/caregiver, or Care coordination (not separately reported).

## 2023-08-09 NOTE — TELEPHONE ENCOUNTER
----- Message from Annemarie Matias MA sent at 2/7/2023  5:02 PM CST -----  Regarding: reapt labs  ----- Message from Anselmo Lugo MD sent at 2/7/2023  2:04 PM CST -----  Call patient.  CBC shows no anemia.  Sugar kidneys and liver are all normal.  Cholesterol excellent at 147 triglycerides 95.  Continue current medications.  In 6 months check A1c CMP lipids urine for microalbumin

## 2023-08-09 NOTE — TELEPHONE ENCOUNTER
Left message that fasting lab and urine are due a week prior to visit, orders at [facility], encouraged water, advised he can take morning meds that do not need to be taken with food. I also added TSH because it looks like she is due for this and not followed by anyone else. Updated remind me.

## 2023-08-10 ENCOUNTER — TELEPHONE (OUTPATIENT)
Dept: UROLOGY | Facility: CLINIC | Age: 63
End: 2023-08-10
Payer: COMMERCIAL

## 2023-08-10 DIAGNOSIS — R30.0 DYSURIA: Primary | ICD-10-CM

## 2023-08-10 RX ORDER — NITROFURANTOIN 25; 75 MG/1; MG/1
100 CAPSULE ORAL 2 TIMES DAILY
Qty: 10 CAPSULE | Refills: 0 | Status: SHIPPED | OUTPATIENT
Start: 2023-08-10 | End: 2023-08-15

## 2023-08-10 NOTE — TELEPHONE ENCOUNTER
Spoke with patient regarding UA results  WBC 33.   Pt would like to start on antibiotic for possible UTI, urine culture in process.  Will adjust antibiotic based on culture results once completed  Discussed side effects and indications for macrobid. Prescription sent to the pharmacy. Pt verbalized understanding. No issues with macrobid in the past  Pt cancelled her CT d/t insurance pending authorization  Gave number to radiology to call and reschedule.

## 2023-08-11 LAB — BACTERIA UR CULT: NORMAL

## 2023-08-15 ENCOUNTER — TELEPHONE (OUTPATIENT)
Dept: UROLOGY | Facility: CLINIC | Age: 63
End: 2023-08-15
Payer: COMMERCIAL

## 2023-08-15 NOTE — TELEPHONE ENCOUNTER
----- Message from Rossana Coleman sent at 8/15/2023  8:28 AM CDT -----  Type:  Patient Returning Call    Who Called:  Pt    Who Left Message for Patient:  not sure    Does the patient know what this is regarding?:  results    Would the patient rather a call back or a response via [employer]?  Call back    Best Call Back Number:  [phone]    Additional Information:  Please call back to advise. Thanks!

## 2023-08-17 ENCOUNTER — TELEPHONE (OUTPATIENT)
Dept: FAMILY MEDICINE | Facility: CLINIC | Age: 63
End: 2023-08-17

## 2023-08-17 NOTE — TELEPHONE ENCOUNTER
----- Message from Mihaela Ford LPN sent at 8/17/2023  9:00 AM CDT -----  Regarding: FW: reapt labs    ----- Message -----  From: Annemarie Matias MA  Sent: 8/7/2023  12:00 AM CDT  To: Elidia Carias, RT  Subject: reapt labs                                       ----- Message from Anselmo Lugo MD sent at 2/7/2023  2:04 PM CST -----  Call patient.  CBC shows no anemia.  Sugar kidneys and liver are all normal.  Cholesterol excellent at 147 triglycerides 95.  Continue current medications.  In 6 months check A1c CMP lipids urine for microalbumin

## 2023-08-23 ENCOUNTER — OFFICE VISIT (OUTPATIENT)
Dept: FAMILY MEDICINE | Facility: CLINIC | Age: 63
End: 2023-08-23
Payer: COMMERCIAL

## 2023-08-23 VITALS
HEIGHT: 68 IN | WEIGHT: 195 LBS | BODY MASS INDEX: 29.55 KG/M2 | HEART RATE: 82 BPM | SYSTOLIC BLOOD PRESSURE: 138 MMHG | DIASTOLIC BLOOD PRESSURE: 80 MMHG

## 2023-08-23 DIAGNOSIS — E78.2 MIXED HYPERLIPIDEMIA: ICD-10-CM

## 2023-08-23 DIAGNOSIS — Z12.31 SCREENING MAMMOGRAM FOR HIGH-RISK PATIENT: ICD-10-CM

## 2023-08-23 DIAGNOSIS — N20.0 KIDNEY STONES: ICD-10-CM

## 2023-08-23 DIAGNOSIS — Z78.0 MENOPAUSE: ICD-10-CM

## 2023-08-23 DIAGNOSIS — M25.551 RIGHT HIP PAIN: ICD-10-CM

## 2023-08-23 DIAGNOSIS — E08.65 DIABETES MELLITUS DUE TO UNDERLYING CONDITION WITH HYPERGLYCEMIA, WITHOUT LONG-TERM CURRENT USE OF INSULIN: Primary | ICD-10-CM

## 2023-08-23 PROCEDURE — 3075F SYST BP GE 130 - 139MM HG: CPT | Mod: CPTII,S$GLB,, | Performed by: FAMILY MEDICINE

## 2023-08-23 PROCEDURE — 3075F PR MOST RECENT SYSTOLIC BLOOD PRESS GE 130-139MM HG: ICD-10-PCS | Mod: CPTII,S$GLB,, | Performed by: FAMILY MEDICINE

## 2023-08-23 PROCEDURE — 3008F BODY MASS INDEX DOCD: CPT | Mod: CPTII,S$GLB,, | Performed by: FAMILY MEDICINE

## 2023-08-23 PROCEDURE — 1159F MED LIST DOCD IN RCRD: CPT | Mod: CPTII,S$GLB,, | Performed by: FAMILY MEDICINE

## 2023-08-23 PROCEDURE — 3079F DIAST BP 80-89 MM HG: CPT | Mod: CPTII,S$GLB,, | Performed by: FAMILY MEDICINE

## 2023-08-23 PROCEDURE — 3044F HG A1C LEVEL LT 7.0%: CPT | Mod: CPTII,S$GLB,, | Performed by: FAMILY MEDICINE

## 2023-08-23 PROCEDURE — 1159F PR MEDICATION LIST DOCUMENTED IN MEDICAL RECORD: ICD-10-PCS | Mod: CPTII,S$GLB,, | Performed by: FAMILY MEDICINE

## 2023-08-23 PROCEDURE — 99214 OFFICE O/P EST MOD 30 MIN: CPT | Mod: S$GLB,,, | Performed by: FAMILY MEDICINE

## 2023-08-23 PROCEDURE — 3079F PR MOST RECENT DIASTOLIC BLOOD PRESSURE 80-89 MM HG: ICD-10-PCS | Mod: CPTII,S$GLB,, | Performed by: FAMILY MEDICINE

## 2023-08-23 PROCEDURE — 3008F PR BODY MASS INDEX (BMI) DOCUMENTED: ICD-10-PCS | Mod: CPTII,S$GLB,, | Performed by: FAMILY MEDICINE

## 2023-08-23 PROCEDURE — 99214 PR OFFICE/OUTPT VISIT, EST, LEVL IV, 30-39 MIN: ICD-10-PCS | Mod: S$GLB,,, | Performed by: FAMILY MEDICINE

## 2023-08-23 PROCEDURE — 3044F PR MOST RECENT HEMOGLOBIN A1C LEVEL <7.0%: ICD-10-PCS | Mod: CPTII,S$GLB,, | Performed by: FAMILY MEDICINE

## 2023-08-23 NOTE — PROGRESS NOTES
SUBJECTIVE:    Patient ID: Savanna West is a 63 y.o. female.    Chief Complaint: Nephrolithiasis (Right flank pain, pt had blood work today, no bottles, mammogram ordered, abc )    63-year-old diabetic female here for six-month checkup.  Currently on metformin alone.  She has not had Ozempic for the last 5 months due to insurance issues and insurance changing.  She has gained 18 lb since coming off of Ozempic.    She works as a  at [employer], walks 10-20 K steps per day    History of UTIs and passing kidney stones.    Her right hip aches intermittently and she takes Aleve p.r.n.    Menopause and hot flashes, IUD implant still remains in place.  Has not had a Pap smear in years, agrees to get a new gyn.    History of DVT in the left leg.  Aspirin 81 mg daily    Had optometry exam with Dr. Herrera-no diabetic retinopathy present    2021-Cologuard negative RTC 3 years            Office Visit on 08/09/2023   Component Date Value Ref Range Status    RBC, UA 08/09/2023 2  0 - 4 /hpf Final    WBC, UA 08/09/2023 33 (H)  0 - 5 /hpf Final    Squam Epithel, UA 08/09/2023 4  /hpf Final    Non-Squam Epith 08/09/2023 1 (A)  <1/hpf /hpf Final    Microscopic Comment 08/09/2023 SEE COMMENT   Final    Urine Culture, Routine 08/09/2023 No significant growth   Final       Past Medical History:   Diagnosis Date    Diabetes mellitus     DVT (deep venous thrombosis)      Social History     Socioeconomic History    Marital status:    Tobacco Use    Smoking status: Never    Smokeless tobacco: Never   Substance and Sexual Activity    Alcohol use: No    Drug use: No    Sexual activity: Yes     Partners: Male     Birth control/protection: Implant     Social Determinants of Health     Physical Activity: Sufficiently Active (12/20/2020)    Exercise Vital Sign     Days of Exercise per Week: 6 days     Minutes of Exercise per Session: 150+ min   Stress: No Stress Concern Present (12/20/2020)    [place] [employer]  Occupational Health - Occupational Stress Questionnaire     Feeling of Stress : Only a little   Social Connections: Unknown (2020)    Social Connection and Isolation Panel [NHANES]     Frequency of Communication with Friends and Family: More than three times a week     Frequency of Social Gatherings with Friends and Family: More than three times a week     Active Member of Clubs or Organizations: No     Attends Club or Organization Meetings: Never     Marital Status: Patient refused     Past Surgical History:   Procedure Laterality Date    BREAST BIOPSY Right 1998     SECTION      GALLBLADDER SURGERY  2011    KIDNEY STONE SURGERY  2006    [place] DILATION/CURETTAGE,DIAGNOSTIC      D & C times 2    SHOULDER SURGERY Right 2010    Reconstruction Dr. Madrigal    TONSILLECTOMY  1964     Family History   Problem Relation Age of Onset    Diabetes Mother     Heart disease Father        The 10-year CVD risk score (Tony, et al., 2008) is: 6.1%    Values used to calculate the score:      Age: 63 years      Sex: Female      Diabetic: No      Tobacco smoker: No      Systolic Blood Pressure: 138 mmHg      Is BP treated: No      HDL Cholesterol: 59 mg/dL      Total Cholesterol: 147 mg/dL    Tests to Keep You Healthy    Mammogram: ORDERED BUT NOT SCHEDULED  Colon Cancer Screening: Met on 2021  Cervical Cancer Screening: DUE      Review of patient's allergies indicates:   Allergen Reactions    Opioids - morphine analogues     Codeine Nausea And Vomiting       Current Outpatient Medications:     aspirin (ECOTRIN) 81 MG EC tablet, Take 81 mg by mouth once daily., Disp: , Rfl:     Bifidobacterium infantis (ALIGN) 10.5 mg (10 million cell) Chew, Take 1 tablet by mouth once daily., Disp: , Rfl:     blood sugar diagnostic Strp, 60 strips by Misc.(Non-Drug; Combo Route) route 2 (two) times a day., Disp: 60 strip, Rfl: 0    cholecalciferol, vitamin D3, (VITAMIN D3) 50 mcg (2,000 unit) Tab, Take 1 tablet by mouth  once daily. , Disp: , Rfl:     cyanocobalamin-cobamamide 5,000-100 mcg Lozg, Place 1 lozenge under the tongue once daily. , Disp: , Rfl:     diphenhydrAMINE (BENADRYL) 25 mg capsule, Take 25 mg by mouth every 6 (six) hours as needed for Itching., Disp: , Rfl:     magnesium 30 mg Tab, Take 30 mg by mouth once. , Disp: , Rfl:     metFORMIN (GLUCOPHAGE) 500 MG tablet, Take 2 tablets (1,000 mg total) by mouth 2 (two) times daily with meals., Disp: 360 tablet, Rfl: 3    multivitamin with minerals (HAIR,SKIN AND NAILS ORAL), Take 1 tablet by mouth once daily. , Disp: , Rfl:     mupirocin (BACTROBAN) 2 % ointment, Apply topically 2 (two) times daily. Apply to affected area, Disp: , Rfl:     mupirocin (BACTROBAN) 2 % ointment, Apply topically 3 (three) times daily., Disp: 22 g, Rfl: 1    naproxen (NAPROSYN) 500 MG tablet, Take 1 tablet (500 mg total) by mouth 2 (two) times daily with meals., Disp: 15 tablet, Rfl: 0    ondansetron (ZOFRAN) 4 MG tablet, Take 1 tablet (4 mg total) by mouth every 8 (eight) hours as needed for Nausea., Disp: 15 tablet, Rfl: 0    rosuvastatin (CRESTOR) 20 MG tablet, Take 1 tablet (20 mg total) by mouth once daily., Disp: 90 tablet, Rfl: 3    vitamin E 400 UNIT capsule, Take 400 Units by mouth once daily., Disp: , Rfl:     zinc sulfate (ZINC-15 ORAL), Take 1 tablet by mouth once daily. , Disp: , Rfl:     blood-glucose meter kit, Use as instructed, Disp: 1 each, Rfl: 0    cefUROXime (CEFTIN) 500 MG tablet, Take 1 tablet (500 mg total) by mouth every 12 (twelve) hours. (Patient not taking: Reported on 8/23/2023), Disp: 20 tablet, Rfl: 0    fluconazole (DIFLUCAN) 150 MG Tab, Take one tab PO at onset of symptoms. Take additional tablet PO on day 3 if symptoms persist. (Patient not taking: Reported on 8/23/2023), Disp: 2 tablet, Rfl: 0    HYDROcodone-acetaminophen (NORCO) 5-325 mg per tablet, Take 1 tablet by mouth every 6 (six) hours as needed for Pain. (Patient not taking: Reported on 8/23/2023),  "Disp: 12 tablet, Rfl: 0    ibuprofen (ADVIL,MOTRIN) 400 MG tablet, Take 1 tablet (400 mg total) by mouth every 6 (six) hours as needed. (Patient not taking: Reported on 8/23/2023), Disp: 20 tablet, Rfl: 0    semaglutide (OZEMPIC) 0.25 mg or 0.5 mg(2 mg/1.5 mL) pen injector, Inject 0.5 mg into the skin every 7 days., Disp: 1 each, Rfl: 5    tamsulosin (FLOMAX) 0.4 mg Cap, Take 1 capsule (0.4 mg total) by mouth once daily. (Patient not taking: Reported on 8/23/2023), Disp: 10 capsule, Rfl: 0    Review of Systems   Constitutional:  Negative for appetite change, chills, fatigue, fever and unexpected weight change.   HENT:  Negative for ear discharge and ear pain.    Eyes:  Negative for pain, discharge and visual disturbance.   Respiratory:  Negative for apnea, cough, shortness of breath and wheezing.    Cardiovascular:  Negative for chest pain, palpitations and leg swelling.   Gastrointestinal:  Negative for abdominal pain, blood in stool, constipation, diarrhea, nausea, vomiting and reflux.   Endocrine: Negative for cold intolerance, heat intolerance and polydipsia.   Genitourinary:  Negative for bladder incontinence, dysuria, hematuria, nocturia and urgency.   Musculoskeletal:  Positive for arthralgias (rt hip aches). Negative for gait problem, joint swelling and myalgias.   Neurological:  Positive for headaches. Negative for dizziness, seizures and numbness.   Psychiatric/Behavioral:  Negative for behavioral problems and hallucinations. The patient is not nervous/anxious.            Objective:      Vitals:    08/23/23 1510   BP: 138/80   Pulse: 82   Weight: 88.5 kg (195 lb)   Height: 5' 8" (1.727 m)     Physical Exam  Vitals and nursing note reviewed.   Constitutional:       General: She is not in acute distress.     Appearance: Normal appearance. She is well-developed. She is not toxic-appearing.   HENT:      Head: Normocephalic and atraumatic.      Right Ear: Tympanic membrane and external ear normal.      Left " Ear: Tympanic membrane and external ear normal.      Nose: Nose normal.      Mouth/Throat:      Pharynx: Oropharynx is clear.   Eyes:      Pupils: Pupils are equal, round, and reactive to light.   Neck:      Thyroid: No thyromegaly.      Vascular: No carotid bruit.   Cardiovascular:      Rate and Rhythm: Normal rate and regular rhythm.      Heart sounds: Normal heart sounds. No murmur heard.  Pulmonary:      Effort: Pulmonary effort is normal.      Breath sounds: Normal breath sounds. No wheezing or rales.   Abdominal:      General: Bowel sounds are normal. There is no distension.      Palpations: Abdomen is soft.      Tenderness: There is no abdominal tenderness.   Musculoskeletal:         General: No tenderness or deformity. Normal range of motion.      Cervical back: Normal range of motion and neck supple.      Lumbar back: Normal. No spasms.      Comments: Bends 90 degrees at  waist, shoulders and knees good range of motion without crepitance.  No pitting edema to lower extremities   Lymphadenopathy:      Cervical: No cervical adenopathy.   Skin:     General: Skin is warm and dry.      Findings: No rash.   Neurological:      Mental Status: She is alert and oriented to person, place, and time. Mental status is at baseline.      Cranial Nerves: No cranial nerve deficit.      Coordination: Coordination normal.   Psychiatric:         Mood and Affect: Mood normal.         Behavior: Behavior normal.         Thought Content: Thought content normal.         Judgment: Judgment normal.           Assessment:       1. Diabetes mellitus due to underlying condition with hyperglycemia, without long-term current use of insulin    2. Screening mammogram for high-risk patient    3. Kidney stones    4. Mixed hyperlipidemia    5. Right hip pain    6. Menopause         Plan:       Diabetes mellitus due to underlying condition with hyperglycemia, without long-term current use of insulin  Lab work done today, will restart Ozempic at  0.5 mg weekly.  Screening mammogram for high-risk patient  -     Mammo Digital Screening Bilat; Future; Expected date: 08/23/2023  Mammogram ordered  Kidney stones  Asymptomatic today  Mixed hyperlipidemia  Lipids done today  Right hip pain  Continue Aleve, and upgrade to meloxicam in the future  Menopause  Needs Pap smear and gyn exam,  Other orders  -     semaglutide (OZEMPIC) 0.25 mg or 0.5 mg(2 mg/1.5 mL) pen injector; Inject 0.5 mg into the skin every 7 days.  Dispense: 1 each; Refill: 5      Follow up in about 6 months (around 2/23/2024) for Diabetic Check-Up.        8/23/2023 Anselmo Lugo

## 2023-08-24 LAB
ALBUMIN SERPL-MCNC: 4.3 G/DL (ref 3.6–5.1)
ALBUMIN/CREAT UR: 10 MCG/MG CREAT
ALBUMIN/GLOB SERPL: 2 (CALC) (ref 1–2.5)
ALP SERPL-CCNC: 70 U/L (ref 37–153)
ALT SERPL-CCNC: 20 U/L (ref 6–29)
AST SERPL-CCNC: 18 U/L (ref 10–35)
BILIRUB SERPL-MCNC: 0.4 MG/DL (ref 0.2–1.2)
BUN SERPL-MCNC: 14 MG/DL (ref 7–25)
BUN/CREAT SERPL: ABNORMAL (CALC) (ref 6–22)
CALCIUM SERPL-MCNC: 9.2 MG/DL (ref 8.6–10.4)
CHLORIDE SERPL-SCNC: 105 MMOL/L (ref 98–110)
CHOLEST SERPL-MCNC: 142 MG/DL
CHOLEST/HDLC SERPL: 2.7 (CALC)
CO2 SERPL-SCNC: 29 MMOL/L (ref 20–32)
CREAT SERPL-MCNC: 0.71 MG/DL (ref 0.5–1.05)
CREAT UR-MCNC: 240 MG/DL (ref 20–275)
EGFR: 95 ML/MIN/1.73M2
GLOBULIN SER CALC-MCNC: 2.2 G/DL (CALC) (ref 1.9–3.7)
GLUCOSE SERPL-MCNC: 142 MG/DL (ref 65–99)
HBA1C MFR BLD: 7.8 % OF TOTAL HGB
HDLC SERPL-MCNC: 52 MG/DL
LDLC SERPL CALC-MCNC: 70 MG/DL (CALC)
MICROALBUMIN UR-MCNC: 2.3 MG/DL
NONHDLC SERPL-MCNC: 90 MG/DL (CALC)
POTASSIUM SERPL-SCNC: 4.4 MMOL/L (ref 3.5–5.3)
PROT SERPL-MCNC: 6.5 G/DL (ref 6.1–8.1)
SODIUM SERPL-SCNC: 139 MMOL/L (ref 135–146)
TRIGL SERPL-MCNC: 114 MG/DL
TSH SERPL-ACNC: 1.23 MIU/L (ref 0.4–4.5)

## 2023-08-27 NOTE — PROGRESS NOTES
Call patient.  A1c has improved from 9.3 down to 7.8.  Fasting sugar was 142 kidneys and liver looked normal.  Cholesterol down to 142 thyroid looks normal.  Resume Ozempic injections as prescribed

## 2023-08-28 ENCOUNTER — TELEPHONE (OUTPATIENT)
Dept: FAMILY MEDICINE | Facility: CLINIC | Age: 63
End: 2023-08-28

## 2023-08-28 NOTE — TELEPHONE ENCOUNTER
Spoke to patient with results verbatim per Dr Lugo. Verbalized understanding on all. Said that she has told our office that she needs PA on Ozempic through [employer]. Advised patient this is probably being worked on and will get it to the correct person. Please let her know status of this. Not sure who this would go to.

## 2023-08-28 NOTE — TELEPHONE ENCOUNTER
----- Message from Anselmo Lugo MD sent at 8/27/2023 12:49 PM CDT -----  Call patient.  A1c has improved from 9.3 down to 7.8.  Fasting sugar was 142 kidneys and liver looked normal.  Cholesterol down to 142 thyroid looks normal.  Resume Ozempic injections as prescribed

## 2023-12-28 NOTE — TELEPHONE ENCOUNTER
----- Message from Maggie Preston sent at 12/28/2023  3:02 PM CST -----  Refill [facility]pic [facility] [address] pt's # [phone]

## 2024-02-16 DIAGNOSIS — E78.2 MIXED HYPERLIPIDEMIA: ICD-10-CM

## 2024-02-19 RX ORDER — ROSUVASTATIN CALCIUM 20 MG/1
20 TABLET, COATED ORAL DAILY
Qty: 90 TABLET | Refills: 3 | Status: SHIPPED | OUTPATIENT
Start: 2024-02-19 | End: 2024-11-15

## 2024-02-22 ENCOUNTER — TELEPHONE (OUTPATIENT)
Dept: FAMILY MEDICINE | Facility: CLINIC | Age: 64
End: 2024-02-22
Payer: COMMERCIAL

## 2024-02-22 DIAGNOSIS — E78.2 MIXED HYPERLIPIDEMIA: ICD-10-CM

## 2024-02-22 DIAGNOSIS — Z79.899 ENCOUNTER FOR LONG-TERM (CURRENT) USE OF OTHER MEDICATIONS: Primary | ICD-10-CM

## 2024-02-22 DIAGNOSIS — E08.65 DIABETES MELLITUS DUE TO UNDERLYING CONDITION WITH HYPERGLYCEMIA, WITHOUT LONG-TERM CURRENT USE OF INSULIN: ICD-10-CM

## 2024-03-06 ENCOUNTER — OFFICE VISIT (OUTPATIENT)
Dept: FAMILY MEDICINE | Facility: CLINIC | Age: 64
End: 2024-03-06
Attending: FAMILY MEDICINE
Payer: COMMERCIAL

## 2024-03-06 VITALS
WEIGHT: 180 LBS | SYSTOLIC BLOOD PRESSURE: 132 MMHG | HEIGHT: 68 IN | DIASTOLIC BLOOD PRESSURE: 80 MMHG | BODY MASS INDEX: 27.28 KG/M2 | HEART RATE: 80 BPM

## 2024-03-06 DIAGNOSIS — E08.65 DIABETES MELLITUS DUE TO UNDERLYING CONDITION WITH HYPERGLYCEMIA, WITHOUT LONG-TERM CURRENT USE OF INSULIN: Primary | ICD-10-CM

## 2024-03-06 DIAGNOSIS — N20.0 KIDNEY STONES: ICD-10-CM

## 2024-03-06 DIAGNOSIS — Z86.16 HISTORY OF 2019 NOVEL CORONAVIRUS DISEASE (COVID-19): ICD-10-CM

## 2024-03-06 DIAGNOSIS — Z78.0 MENOPAUSE: ICD-10-CM

## 2024-03-06 DIAGNOSIS — E78.2 MIXED HYPERLIPIDEMIA: ICD-10-CM

## 2024-03-06 LAB — HBA1C MFR BLD: 6.5 %

## 2024-03-06 PROCEDURE — 3075F SYST BP GE 130 - 139MM HG: CPT | Mod: CPTII,S$GLB,, | Performed by: FAMILY MEDICINE

## 2024-03-06 PROCEDURE — 99214 OFFICE O/P EST MOD 30 MIN: CPT | Mod: S$GLB,,, | Performed by: FAMILY MEDICINE

## 2024-03-06 PROCEDURE — 1159F MED LIST DOCD IN RCRD: CPT | Mod: CPTII,S$GLB,, | Performed by: FAMILY MEDICINE

## 2024-03-06 PROCEDURE — 3079F DIAST BP 80-89 MM HG: CPT | Mod: CPTII,S$GLB,, | Performed by: FAMILY MEDICINE

## 2024-03-06 PROCEDURE — 83036 HEMOGLOBIN GLYCOSYLATED A1C: CPT | Mod: QW,,, | Performed by: FAMILY MEDICINE

## 2024-03-06 PROCEDURE — 3008F BODY MASS INDEX DOCD: CPT | Mod: CPTII,S$GLB,, | Performed by: FAMILY MEDICINE

## 2024-03-06 PROCEDURE — 3044F HG A1C LEVEL LT 7.0%: CPT | Mod: CPTII,S$GLB,, | Performed by: FAMILY MEDICINE

## 2024-03-09 NOTE — PROGRESS NOTES
SUBJECTIVE:    Patient ID: Savanna West is a 63 y.o. female.    Chief Complaint: Diabetes (No bottles, A1c and foot exam ordered, declined flu vaccine, right back and hip pain, abc )    63-year-old female was positive for COVID.  She had fatigue and cough that finally resolve.    She has lost 15 lb since last visit.  She drinks smoothies and eats only 1 main meal per day.    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    Her daughter  in 2023 of bilateral pulmonary emboli.  Patient states this she also had a blood clot when she was young.  Patient has never had a hypercoagulable workup.    Has been using Ozempic 0.5 mg weekly.  A1c for diabetes is down to 6.5 which is excellent.    History of hyperlipidemia on rosuvastatin 20 mg daily    Diabetes  Pertinent negatives for hypoglycemia include no dizziness, nervousness/anxiousness or seizures. Pertinent negatives for diabetes include no chest pain, no fatigue and no polydipsia.       Office Visit on 2024   Component Date Value Ref Range Status    Hemoglobin A1C, POC 2024 6.5  % Final       Past Medical History:   Diagnosis Date    Diabetes mellitus     DVT (deep venous thrombosis)      Social History     Socioeconomic History    Marital status:    Tobacco Use    Smoking status: Never    Smokeless tobacco: Never   Substance and Sexual Activity    Alcohol use: No    Drug use: No    Sexual activity: Yes     Partners: Male     Birth control/protection: Implant     Social Determinants of Health     Physical Activity: Sufficiently Active (2020)    Exercise Vital Sign     Days of Exercise per Week: 6 days     Minutes of Exercise per Session: 150+ min   Stress: No Stress Concern Present (2020)    [place] [employer] - Occupational Stress Questionnaire     Feeling of Stress : Only a little   Social Connections: Unknown (2020)    Social Connection and Isolation Panel  [NHANES]     Frequency of Communication with Friends and Family: More than three times a week     Frequency of Social Gatherings with Friends and Family: More than three times a week     Active Member of Clubs or Organizations: No     Attends Club or Organization Meetings: Never     Marital Status: Patient declined     Past Surgical History:   Procedure Laterality Date    BREAST BIOPSY Right 1998     SECTION      GALLBLADDER SURGERY  2011    KIDNEY STONE SURGERY  2006    [place] DILATION/CURETTAGE,DIAGNOSTIC      D & C times 2    SHOULDER SURGERY Right 2010    Reconstruction Dr. Madrigal    TONSILLECTOMY  1964     Family History   Problem Relation Age of Onset    Diabetes Mother     Heart disease Father        The 10-year CVD risk score (Tony, et al., 2008) is: 5.7%    Values used to calculate the score:      Age: 63 years      Sex: Female      Diabetic: No      Tobacco smoker: No      Systolic Blood Pressure: 132 mmHg      Is BP treated: No      HDL Cholesterol: 52 mg/dL      Total Cholesterol: 142 mg/dL    Tests to Keep You Healthy    Mammogram: ORDERED BUT NOT SCHEDULED  Colon Cancer Screening: Met on 2021  Cervical Cancer Screening: DUE      Review of patient's allergies indicates:   Allergen Reactions    Opioids - morphine analogues     Codeine Nausea And Vomiting       Current Outpatient Medications:     aspirin (ECOTRIN) 81 MG EC tablet, Take 81 mg by mouth once daily., Disp: , Rfl:     Bifidobacterium infantis (ALIGN) 10.5 mg (10 million cell) Chew, Take 1 tablet by mouth once daily., Disp: , Rfl:     blood sugar diagnostic Strp, 60 strips by Misc.(Non-Drug; Combo Route) route 2 (two) times a day., Disp: 60 strip, Rfl: 0    cholecalciferol, vitamin D3, (VITAMIN D3) 50 mcg (2,000 unit) Tab, Take 1 tablet by mouth once daily. , Disp: , Rfl:     cyanocobalamin-cobamamide 5,000-100 mcg Lozg, Place 1 lozenge under the tongue once daily. , Disp: , Rfl:     diphenhydrAMINE (BENADRYL) 25 mg  capsule, Take 25 mg by mouth every 6 (six) hours as needed for Itching., Disp: , Rfl:     fluconazole (DIFLUCAN) 150 MG Tab, Take one tab PO at onset of symptoms. Take additional tablet PO on day 3 if symptoms persist., Disp: 2 tablet, Rfl: 0    HYDROcodone-acetaminophen (NORCO) 5-325 mg per tablet, Take 1 tablet by mouth every 6 (six) hours as needed for Pain., Disp: 12 tablet, Rfl: 0    ibuprofen (ADVIL,MOTRIN) 400 MG tablet, Take 1 tablet (400 mg total) by mouth every 6 (six) hours as needed., Disp: 20 tablet, Rfl: 0    magnesium 30 mg Tab, Take 30 mg by mouth once. , Disp: , Rfl:     metFORMIN (GLUCOPHAGE) 500 MG tablet, Take 2 tablets (1,000 mg total) by mouth 2 (two) times daily with meals., Disp: 360 tablet, Rfl: 3    multivitamin with minerals (HAIR,SKIN AND NAILS ORAL), Take 1 tablet by mouth once daily. , Disp: , Rfl:     mupirocin (BACTROBAN) 2 % ointment, Apply topically 2 (two) times daily. Apply to affected area, Disp: , Rfl:     mupirocin (BACTROBAN) 2 % ointment, Apply topically 3 (three) times daily., Disp: 22 g, Rfl: 1    naproxen (NAPROSYN) 500 MG tablet, Take 1 tablet (500 mg total) by mouth 2 (two) times daily with meals., Disp: 15 tablet, Rfl: 0    ondansetron (ZOFRAN) 4 MG tablet, Take 1 tablet (4 mg total) by mouth every 8 (eight) hours as needed for Nausea., Disp: 15 tablet, Rfl: 0    rosuvastatin (CRESTOR) 20 MG tablet, Take 1 tablet (20 mg total) by mouth once daily., Disp: 90 tablet, Rfl: 3    semaglutide (OZEMPIC) 0.25 mg or 0.5 mg(2 mg/1.5 mL) pen injector, Inject 0.5 mg into the skin every 7 days., Disp: 1 each, Rfl: 5    vitamin E 400 UNIT capsule, Take 400 Units by mouth once daily., Disp: , Rfl:     zinc sulfate (ZINC-15 ORAL), Take 1 tablet by mouth once daily. , Disp: , Rfl:     blood-glucose meter kit, Use as instructed, Disp: 1 each, Rfl: 0    cefUROXime (CEFTIN) 500 MG tablet, Take 1 tablet (500 mg total) by mouth every 12 (twelve) hours. (Patient not taking: Reported on  "3/6/2024), Disp: 20 tablet, Rfl: 0    tamsulosin (FLOMAX) 0.4 mg Cap, Take 1 capsule (0.4 mg total) by mouth once daily. (Patient not taking: Reported on 8/23/2023), Disp: 10 capsule, Rfl: 0    Review of Systems   Constitutional:  Negative for appetite change, chills, fatigue, fever and unexpected weight change.   HENT:  Negative for ear discharge and ear pain.    Eyes:  Negative for pain, discharge and visual disturbance.   Respiratory:  Negative for apnea, cough, shortness of breath and wheezing.    Cardiovascular:  Negative for chest pain, palpitations and leg swelling.   Gastrointestinal:  Negative for abdominal pain, blood in stool, constipation, diarrhea, nausea, vomiting and reflux.   Endocrine: Negative for cold intolerance, heat intolerance and polydipsia.   Genitourinary:  Negative for bladder incontinence, dysuria, hematuria, nocturia and urgency.   Musculoskeletal:  Negative for gait problem, joint swelling and myalgias.   Neurological:  Negative for dizziness, seizures and numbness.   Psychiatric/Behavioral:  Negative for behavioral problems and hallucinations. The patient is not nervous/anxious.            Objective:      Vitals:    03/06/24 1533   BP: 132/80   Pulse: 80   Weight: 81.6 kg (180 lb)   Height: 5' 8" (1.727 m)     Physical Exam  Vitals and nursing note reviewed.   Constitutional:       General: She is not in acute distress.     Appearance: Normal appearance. She is well-developed. She is not toxic-appearing.   HENT:      Head: Normocephalic and atraumatic.      Right Ear: Tympanic membrane and external ear normal.      Left Ear: Tympanic membrane and external ear normal.      Nose: Nose normal.      Mouth/Throat:      Pharynx: Oropharynx is clear.   Eyes:      Pupils: Pupils are equal, round, and reactive to light.   Neck:      Thyroid: No thyromegaly.      Vascular: No carotid bruit.   Cardiovascular:      Rate and Rhythm: Normal rate and regular rhythm.      Heart sounds: Normal heart " sounds. No murmur heard.  Pulmonary:      Effort: Pulmonary effort is normal.      Breath sounds: Normal breath sounds. No wheezing or rales.   Abdominal:      General: Bowel sounds are normal. There is no distension.      Palpations: Abdomen is soft.      Tenderness: There is no abdominal tenderness.   Musculoskeletal:         General: No tenderness or deformity. Normal range of motion.      Cervical back: Normal range of motion and neck supple.      Lumbar back: Normal. No spasms.      Comments: Bends 90 degrees at  waist shoulders and knees good range of motion without crepitance.  No pitting edema to lower extremities   Feet:      Right foot:      Protective Sensation: 5 sites tested.  5 sites sensed.      Skin integrity: Skin integrity normal.      Left foot:      Protective Sensation: 5 sites tested.  5 sites sensed.      Skin integrity: Skin integrity normal.   Lymphadenopathy:      Cervical: No cervical adenopathy.   Skin:     General: Skin is warm and dry.      Findings: No rash.   Neurological:      Mental Status: She is alert and oriented to person, place, and time. Mental status is at baseline.      Cranial Nerves: No cranial nerve deficit.      Coordination: Coordination normal.   Psychiatric:         Behavior: Behavior normal.         Thought Content: Thought content normal.         Judgment: Judgment normal.           Assessment:       1. Diabetes mellitus due to underlying condition with hyperglycemia, without long-term current use of insulin    2. Mixed hyperlipidemia    3. Menopause    4. Kidney stones    5. History of 2019 novel coronavirus disease (COVID-19)         Plan:       Diabetes mellitus due to underlying condition with hyperglycemia, without long-term current use of insulin  -     Hemoglobin A1C, POCT  -     Foot Exam Performed  -     Comprehensive Metabolic Panel; Future; Expected date: 03/06/2024  -     Lipid Panel; Future; Expected date: 03/06/2024  -     TSH w/reflex to FT4; Future;  Expected date: 03/06/2024  -     CBC Auto Differential; Future; Expected date: 03/06/2024  A1c 6.5 showing excellent control, continue Ozempic.  Mixed hyperlipidemia  Lab work ordered to check her lipids, continue rosuvastatin 20 mg  Menopause    Kidney stones    History of 2019 novel coronavirus disease (COVID-19)  Recommend patient has seen Dr. Olmedo for hypercoagulable workup.  Mammogram due this year  Follow up in about 6 months (around 9/6/2024).        3/8/2024 Anselmo Lugo

## 2024-03-28 ENCOUNTER — PATIENT MESSAGE (OUTPATIENT)
Dept: ADMINISTRATIVE | Facility: HOSPITAL | Age: 64
End: 2024-03-28
Payer: COMMERCIAL

## 2024-04-19 ENCOUNTER — PATIENT MESSAGE (OUTPATIENT)
Dept: ADMINISTRATIVE | Facility: HOSPITAL | Age: 64
End: 2024-04-19
Payer: COMMERCIAL

## 2024-04-22 ENCOUNTER — PATIENT OUTREACH (OUTPATIENT)
Dept: ADMINISTRATIVE | Facility: HOSPITAL | Age: 64
End: 2024-04-22
Payer: COMMERCIAL

## 2024-04-22 ENCOUNTER — PATIENT MESSAGE (OUTPATIENT)
Dept: ADMINISTRATIVE | Facility: HOSPITAL | Age: 64
End: 2024-04-22
Payer: COMMERCIAL

## 2024-04-22 NOTE — PROGRESS NOTES
Population Health Chart Review & Patient Outreach Details      Additional Pop Health Notes:    Called regarding mammogram and other HM appts. Left voice message.           Updates Requested / Reviewed:      Updated Care Coordination Note, Care Everywhere, , External Sources: [facility], [facility], and [facility], Removed  or Duplicate Orders, and Immunizations Reconciliation Completed or Queried: [facility] Topics Overdue:      VBHM Score: 2     Cervical Cancer Screening  Mammogram    Influenza Vaccine  Tetanus Vaccine  Shingles/Zoster Vaccine  RSV Vaccine                  Health Maintenance Topic(s) Outreach Outcomes & Actions Taken:    Breast Cancer Screening - Outreach Outcomes & Actions Taken  : Reminder letter sent via pt portal    Cervical Cancer Screening - Outreach Outcomes & Actions Taken  : Reminder letter sent via pt portal.

## 2024-04-23 ENCOUNTER — PATIENT MESSAGE (OUTPATIENT)
Dept: ADMINISTRATIVE | Facility: HOSPITAL | Age: 64
End: 2024-04-23
Payer: COMMERCIAL

## 2024-05-06 RX ORDER — PHENAZOPYRIDINE HYDROCHLORIDE 200 MG/1
200 TABLET, FILM COATED ORAL 3 TIMES DAILY PRN
Qty: 21 TABLET | Refills: 0 | Status: SHIPPED | OUTPATIENT
Start: 2024-05-06

## 2024-05-06 RX ORDER — NITROFURANTOIN 25; 75 MG/1; MG/1
100 CAPSULE ORAL 2 TIMES DAILY
Qty: 20 CAPSULE | Refills: 0 | Status: SHIPPED | OUTPATIENT
Start: 2024-05-06

## 2024-05-06 NOTE — TELEPHONE ENCOUNTER
"Per Dr. Lugo "macrobid 100mg po BID #20. Pyridium 200mg TID prn #21." LMOR letting pt know rx was being sent.   "

## 2024-05-06 NOTE — TELEPHONE ENCOUNTER
Spoke with patient who states the symptoms just started today, she is having burning and urgency. Not allergic to any abx.   Would like an abx and pyridium to the pharmacy.  [facility] [address].   Printed.

## 2024-05-06 NOTE — TELEPHONE ENCOUNTER
----- Message from Maggie Preston sent at 5/6/2024  3:37 PM CDT -----   The patient has a bad UTI. She wants an antibiotic called in. [facility] [address]  PT[facility]S # [phone] [facility]

## 2024-05-22 DIAGNOSIS — Z12.31 OTHER SCREENING MAMMOGRAM: ICD-10-CM

## 2024-05-27 ENCOUNTER — PATIENT MESSAGE (OUTPATIENT)
Dept: ADMINISTRATIVE | Facility: HOSPITAL | Age: 64
End: 2024-05-27
Payer: COMMERCIAL

## 2024-08-26 ENCOUNTER — TELEPHONE (OUTPATIENT)
Dept: FAMILY MEDICINE | Facility: CLINIC | Age: 64
End: 2024-08-26
Payer: COMMERCIAL

## 2024-08-26 DIAGNOSIS — Z79.899 ENCOUNTER FOR LONG-TERM (CURRENT) USE OF OTHER MEDICATIONS: Primary | ICD-10-CM

## 2024-08-26 DIAGNOSIS — E08.65 DIABETES MELLITUS DUE TO UNDERLYING CONDITION WITH HYPERGLYCEMIA, WITHOUT LONG-TERM CURRENT USE OF INSULIN: ICD-10-CM

## 2024-08-26 DIAGNOSIS — E78.2 MIXED HYPERLIPIDEMIA: ICD-10-CM

## 2024-08-26 DIAGNOSIS — Z78.0 MENOPAUSE: ICD-10-CM

## 2024-08-26 NOTE — TELEPHONE ENCOUNTER
Spoke to patient that fasting lab and urine is due a week prior to visit, orders at [facility], encouraged water. Advised she can take morning meds that do not need to be taken with food.

## 2024-09-19 ENCOUNTER — OFFICE VISIT (OUTPATIENT)
Dept: FAMILY MEDICINE | Facility: CLINIC | Age: 64
End: 2024-09-19
Payer: COMMERCIAL

## 2024-09-19 VITALS
DIASTOLIC BLOOD PRESSURE: 82 MMHG | SYSTOLIC BLOOD PRESSURE: 134 MMHG | WEIGHT: 188.81 LBS | BODY MASS INDEX: 27.96 KG/M2 | OXYGEN SATURATION: 97 % | HEART RATE: 84 BPM | HEIGHT: 69 IN

## 2024-09-19 DIAGNOSIS — E08.65 DIABETES MELLITUS DUE TO UNDERLYING CONDITION WITH HYPERGLYCEMIA, WITHOUT LONG-TERM CURRENT USE OF INSULIN: Primary | ICD-10-CM

## 2024-09-19 DIAGNOSIS — G89.29 CHRONIC MIDLINE LOW BACK PAIN WITHOUT SCIATICA: ICD-10-CM

## 2024-09-19 DIAGNOSIS — E78.2 MIXED HYPERLIPIDEMIA: ICD-10-CM

## 2024-09-19 DIAGNOSIS — M54.50 CHRONIC MIDLINE LOW BACK PAIN WITHOUT SCIATICA: ICD-10-CM

## 2024-09-19 DIAGNOSIS — M15.9 OSTEOARTHRITIS OF MULTIPLE JOINTS, UNSPECIFIED OSTEOARTHRITIS TYPE: ICD-10-CM

## 2024-09-19 DIAGNOSIS — Z12.31 ENCOUNTER FOR SCREENING MAMMOGRAM FOR MALIGNANT NEOPLASM OF BREAST: ICD-10-CM

## 2024-09-19 DIAGNOSIS — Z12.11 SPECIAL SCREENING FOR MALIGNANT NEOPLASMS, COLON: ICD-10-CM

## 2024-09-19 PROCEDURE — 1160F RVW MEDS BY RX/DR IN RCRD: CPT | Mod: CPTII,S$GLB,,

## 2024-09-19 PROCEDURE — 3075F SYST BP GE 130 - 139MM HG: CPT | Mod: CPTII,S$GLB,,

## 2024-09-19 PROCEDURE — 3079F DIAST BP 80-89 MM HG: CPT | Mod: CPTII,S$GLB,,

## 2024-09-19 PROCEDURE — 99214 OFFICE O/P EST MOD 30 MIN: CPT | Mod: S$GLB,,,

## 2024-09-19 PROCEDURE — 1159F MED LIST DOCD IN RCRD: CPT | Mod: CPTII,S$GLB,,

## 2024-09-19 PROCEDURE — 3051F HG A1C>EQUAL 7.0%<8.0%: CPT | Mod: CPTII,S$GLB,,

## 2024-09-19 PROCEDURE — 3008F BODY MASS INDEX DOCD: CPT | Mod: CPTII,S$GLB,,

## 2024-09-19 RX ORDER — CELECOXIB 200 MG/1
200 CAPSULE ORAL DAILY PRN
Qty: 30 CAPSULE | Refills: 2 | Status: SHIPPED | OUTPATIENT
Start: 2024-09-19

## 2024-09-19 RX ORDER — CYCLOBENZAPRINE HCL 10 MG
10 TABLET ORAL NIGHTLY PRN
Qty: 30 TABLET | Refills: 2 | Status: SHIPPED | OUTPATIENT
Start: 2024-09-19

## 2024-09-19 RX ORDER — ROSUVASTATIN CALCIUM 20 MG/1
20 TABLET, COATED ORAL DAILY
Qty: 90 TABLET | Refills: 3 | Status: SHIPPED | OUTPATIENT
Start: 2024-09-19 | End: 2025-09-19

## 2024-09-23 ENCOUNTER — TELEPHONE (OUTPATIENT)
Dept: FAMILY MEDICINE | Facility: CLINIC | Age: 64
End: 2024-09-23
Payer: COMMERCIAL

## 2024-09-23 NOTE — LETTER
[address] [facility] [address]  [place] [address]  Phone: [phone]   [phone]                        MD Ruthy Reyna MD Chequita Williams, MD Matthew Bassett, PA-C Linda Melerine, WIN He, WIN Alberts, WIN      Date: 9/25/2024    Dear Ms West,    We tried to reach you several times by telephone to discuss the below results with you. Please contact our office.      ----- Message from Anselmo Lugo MD sent at 9/22/2024  9:34 PM CDT -----  Call patient.  Her A1c is drifted up to 7.3.  Fasting sugar was 120.  Is she still taking her Ozempic 0.5 mg weekly?  If so we can go up to 1 mg weekly if she desires.  Liver and kidney enzymes are normal, CBC shows no anemia.  Cholesterol excellent at 150, urine test shows no infection        Sincerely,  Dr Lugo/kathy

## 2024-09-23 NOTE — TELEPHONE ENCOUNTER
----- Message from Anselmo Lugo MD sent at 9/22/2024  9:34 PM CDT -----  Call patient.  Her A1c is drifted up to 7.3.  Fasting sugar was 120.  Is she still taking her Ozempic 0.5 mg weekly?  If so we can go up to 1 mg weekly if she desires.  Liver and kidney enzymes are normal, CBC shows no anemia.  Cholesterol excellent at 150, urine test shows no infection

## 2024-09-25 NOTE — TELEPHONE ENCOUNTER
LMOR for patient and EC to have her call me. 4 attempts made. Should I send non-certified letter? She has not been on portal since February of this year.

## 2024-09-26 ENCOUNTER — TELEPHONE (OUTPATIENT)
Dept: FAMILY MEDICINE | Facility: CLINIC | Age: 64
End: 2024-09-26
Payer: COMMERCIAL

## 2024-09-26 NOTE — TELEPHONE ENCOUNTER
----- Message from Shira Bridges sent at 9/26/2024  2:50 PM CDT -----  Vm: 107    Pt was returning a call.    [phone]

## 2024-09-26 NOTE — TELEPHONE ENCOUNTER
Spoke to patient with results verbatim per Dr Lugo. Verbalized understanding. Said she was taking Ozempic 0.5 weekly, but ran out a couple of weeks before her labs were done. She is back on it now. Still increase to 1mg if she desires?

## 2024-10-02 ENCOUNTER — PATIENT MESSAGE (OUTPATIENT)
Dept: FAMILY MEDICINE | Facility: CLINIC | Age: 64
End: 2024-10-02
Payer: COMMERCIAL

## 2024-10-02 NOTE — PROGRESS NOTES
SUBJECTIVE:    Patient ID: Savanna West is a 64 y.o. female.    Chief Complaint: Follow-up (6 mo follow up/ did lab work in quest including A1c / no medical concerns/ occasional heartburn/ declines flu vaccine/ cologuard /mammo referral/ discuss provider who specializes in blood clotting/ discuss gyn//mp)    64 year old established female presents today for 6 mo follow up.    Patient has no acute complaints today.    We did review labs.    Hepatitis C Screening Never done  TETANUS VACCINE Never done  Shingles Vaccine(1 of 2) Never done  Mammogram due on 08/17/2022  Colorectal Cancer Screening due on 08/11/2024  Lipid Panel due on 09/19/2029        Follow-up  Pertinent negatives include no chest pain, coughing, fatigue, headaches, nausea, numbness, rash, vomiting or weakness.       Telephone on 08/26/2024   Component Date Value Ref Range Status    Color, UA 09/19/2024 YELLOW  YELLOW Final    Appearance, UA 09/19/2024 CLEAR  CLEAR Final    Specific Gravity, UA 09/19/2024 1.015  1.001 - 1.035 Final    pH, UA 09/19/2024 5.5  5.0 - 8.0 Final    Glucose, UA 09/19/2024 NEGATIVE  NEGATIVE Final    Bilirubin, UA 09/19/2024 NEGATIVE  NEGATIVE Final    Ketones, UA 09/19/2024 NEGATIVE  NEGATIVE Final    Occult Blood UA 09/19/2024 NEGATIVE  NEGATIVE Final    Protein, UA 09/19/2024 NEGATIVE  NEGATIVE Final    Nitrite, UA 09/19/2024 NEGATIVE  NEGATIVE Final    Leukocytes, UA 09/19/2024 2+ (A)  NEGATIVE Final    WBC Casts, UA 09/19/2024 0-5  < OR = 5 /HPF Final    RBC Casts, UA 09/19/2024 NONE SEEN  < OR = 2 /HPF Final    Squam Epithel, UA 09/19/2024 0-5  < OR = 5 /HPF Final    Bacteria, UA 09/19/2024 NONE SEEN  NONE SEEN /HPF Final    Hyaline Casts, UA 09/19/2024 NONE SEEN  NONE SEEN /LPF Final    Service Cmt: 09/19/2024 SEE COMMENT   Final    Reflexive Urine Culture 09/19/2024 SEE COMMENT   Final    Urine Culture, Routine 09/19/2024 SEE COMMENT   Final    TSH w/reflex to FT4 09/19/2024 1.78  0.40 - 4.50 mIU/L Final     Hemoglobin A1C 09/19/2024 7.3 (H)  <5.7 % of total Hgb Final    Glucose 09/19/2024 120 (H)  65 - 99 mg/dL Final    BUN 09/19/2024 11  7 - 25 mg/dL Final    Creatinine 09/19/2024 0.61  0.50 - 1.05 mg/dL Final    eGFR 09/19/2024 100  > OR = 60 mL/min/1.73m2 Final    BUN/Creatinine Ratio 09/19/2024 SEE NOTE:  6 - 22 (calc) Final    Sodium 09/19/2024 139  135 - 146 mmol/L Final    Potassium 09/19/2024 4.3  3.5 - 5.3 mmol/L Final    Chloride 09/19/2024 105  98 - 110 mmol/L Final    CO2 09/19/2024 29  20 - 32 mmol/L Final    Calcium 09/19/2024 9.1  8.6 - 10.4 mg/dL Final    Total Protein 09/19/2024 6.4  6.1 - 8.1 g/dL Final    Albumin 09/19/2024 4.0  3.6 - 5.1 g/dL Final    Globulin, Total 09/19/2024 2.4  1.9 - 3.7 g/dL (calc) Final    Albumin/Globulin Ratio 09/19/2024 1.7  1.0 - 2.5 (calc) Final    Total Bilirubin 09/19/2024 0.3  0.2 - 1.2 mg/dL Final    Alkaline Phosphatase 09/19/2024 72  37 - 153 U/L Final    AST 09/19/2024 17  10 - 35 U/L Final    ALT 09/19/2024 18  6 - 29 U/L Final    Cholesterol 09/19/2024 150  <200 mg/dL Final    HDL 09/19/2024 62  > OR = 50 mg/dL Final    Triglycerides 09/19/2024 126  <150 mg/dL Final    LDL Cholesterol 09/19/2024 67  mg/dL (calc) Final    HDL/Cholesterol Ratio 09/19/2024 2.4  <5.0 (calc) Final    Non HDL Chol. (LDL+VLDL) 09/19/2024 88  <130 mg/dL (calc) Final    Creatinine, Urine 09/19/2024 90  20 - 275 mg/dL Final    Microalb, Ur 09/19/2024 1.0  See Note: mg/dL Final    Microalb/Creat Ratio 09/19/2024 11  <30 mg/g creat Final    WBC 09/19/2024 7.4  3.8 - 10.8 Thousand/uL Final    RBC 09/19/2024 4.27  3.80 - 5.10 Million/uL Final    Hemoglobin 09/19/2024 13.0  11.7 - 15.5 g/dL Final    Hematocrit 09/19/2024 40.0  35.0 - 45.0 % Final    MCV 09/19/2024 93.7  80.0 - 100.0 fL Final    MCH 09/19/2024 30.4  27.0 - 33.0 pg Final    MCHC 09/19/2024 32.5  32.0 - 36.0 g/dL Final    RDW 09/19/2024 12.3  11.0 - 15.0 % Final    Platelets 09/19/2024 280  140 - 400 Thousand/uL Final    MPV  2024 10.3  7.5 - 12.5 fL Final    Neutrophils, Abs 2024 4,810  1,500 - 7,800 cells/uL Final    Lymph # 2024 2,094  850 - 3,900 cells/uL Final    Mono # 2024 370  200 - 950 cells/uL Final    Eos # 2024 104  15 - 500 cells/uL Final    Baso # 2024 22  0 - 200 cells/uL Final    Neutrophils Relative 2024 65  % Final    Lymph % 2024 28.3  % Final    Mono % 2024 5.0  % Final    Eosinophil % 2024 1.4  % Final    Basophil % 2024 0.3  % Final       Past Medical History:   Diagnosis Date    Diabetes mellitus     DVT (deep venous thrombosis)      Social History     Socioeconomic History    Marital status:    Tobacco Use    Smoking status: Never    Smokeless tobacco: Never   Substance and Sexual Activity    Alcohol use: No    Drug use: No    Sexual activity: Yes     Partners: Male     Birth control/protection: Implant     Social Drivers of Health     Physical Activity: Sufficiently Active (2020)    Exercise Vital Sign     Days of Exercise per Week: 6 days     Minutes of Exercise per Session: 150+ min   Stress: No Stress Concern Present (2020)    [employer] [place] [employer] - Occupational Stress Questionnaire     Feeling of Stress : Only a little     Past Surgical History:   Procedure Laterality Date    BREAST BIOPSY Right 1998     SECTION      GALLBLADDER SURGERY  2011    KIDNEY STONE SURGERY  2006    [place] DILATION/CURETTAGE,DIAGNOSTIC      D & C times 2    SHOULDER SURGERY Right 2010    Reconstruction Dr. Madrigal    TONSILLECTOMY  1964     Family History   Problem Relation Name Age of Onset    Diabetes Mother Meredith Espino     Heart disease Father Gil Wyatt        The 10-year CVD risk score (ELDER'Reginald, et al., 2008) is: 4.9%    Values used to calculate the score:      Age: 64 years      Sex: Female      Diabetic: No      Tobacco smoker: No      Systolic Blood Pressure: 126 mmHg      Is BP treated: No       HDL Cholesterol: 62 mg/dL      Total Cholesterol: 150 mg/dL    Tests to Keep You Healthy    Mammogram: ORDERED BUT NOT SCHEDULED  Colon Cancer Screening: ORDERED  Cervical Cancer Screening: DUE      Review of patient's allergies indicates:   Allergen Reactions    Opioids - morphine analogues     Codeine Nausea And Vomiting       Current Outpatient Medications:     aspirin (ECOTRIN) 81 MG EC tablet, Take 81 mg by mouth once daily., Disp: , Rfl:     Bifidobacterium infantis (ALIGN) 10.5 mg (10 million cell) Chew, Take 1 tablet by mouth once daily., Disp: , Rfl:     cholecalciferol, vitamin D3, (VITAMIN D3) 50 mcg (2,000 unit) Tab, Take 1 tablet by mouth once daily. , Disp: , Rfl:     cyanocobalamin-cobamamide 5,000-100 mcg Lozg, Place 1 lozenge under the tongue once daily. , Disp: , Rfl:     magnesium 30 mg Tab, Take 30 mg by mouth once. , Disp: , Rfl:     metFORMIN (GLUCOPHAGE) 500 MG tablet, Take 2 tablets (1,000 mg total) by mouth 2 (two) times daily with meals., Disp: 360 tablet, Rfl: 3    mupirocin (BACTROBAN) 2 % ointment, Apply topically 3 (three) times daily., Disp: 22 g, Rfl: 1    ondansetron (ZOFRAN) 4 MG tablet, Take 1 tablet (4 mg total) by mouth every 8 (eight) hours as needed for Nausea., Disp: 15 tablet, Rfl: 0    semaglutide (OZEMPIC) 0.25 mg or 0.5 mg(2 mg/1.5 mL) pen injector, Inject 0.5 mg into the skin every 7 days., Disp: 1 each, Rfl: 5    vitamin E 400 UNIT capsule, Take 400 Units by mouth once daily., Disp: , Rfl:     zinc sulfate (ZINC-15 ORAL), Take 1 tablet by mouth once daily. , Disp: , Rfl:     blood sugar diagnostic Strp, 60 strips by Misc.(Non-Drug; Combo Route) route 2 (two) times a day., Disp: 60 strip, Rfl: 0    blood-glucose meter kit, Use as instructed, Disp: 1 each, Rfl: 0    celecoxib (CELEBREX) 200 MG capsule, Take 1 capsule (200 mg total) by mouth daily as needed for Pain (arthritis)., Disp: 30 capsule, Rfl: 2    cyclobenzaprine (FLEXERIL) 10 MG tablet, Take 1 tablet (10 mg  "total) by mouth nightly as needed for Muscle spasms., Disp: 30 tablet, Rfl: 2    fluconazole (DIFLUCAN) 150 MG Tab, Take 1 tablet (150 mg total) by mouth once daily., Disp: 2 tablet, Rfl: 0    multivitamin with minerals (HAIR,SKIN AND NAILS ORAL), Take 1 tablet by mouth once daily.  (Patient not taking: Reported on 9/19/2024), Disp: , Rfl:     mupirocin (BACTROBAN) 2 % ointment, Apply topically 2 (two) times daily. Apply to affected area, Disp: , Rfl:     rosuvastatin (CRESTOR) 20 MG tablet, Take 1 tablet (20 mg total) by mouth once daily., Disp: 90 tablet, Rfl: 3    sulfamethoxazole-trimethoprim 800-160mg (BACTRIM DS) 800-160 mg Tab, Take 1 tablet by mouth 2 (two) times daily. for 7 days, Disp: 14 tablet, Rfl: 0    Review of Systems   Constitutional:  Negative for activity change, appetite change, fatigue and unexpected weight change.   HENT:  Negative for trouble swallowing.    Eyes:  Negative for pain and visual disturbance.   Respiratory:  Negative for cough and shortness of breath.    Cardiovascular:  Negative for chest pain and leg swelling.   Gastrointestinal:  Positive for reflux. Negative for constipation, diarrhea, nausea and vomiting.   Genitourinary:  Negative for bladder incontinence, frequency and urgency.   Integumentary:  Negative for rash.   Neurological:  Negative for dizziness, tremors, weakness, numbness, headaches and memory loss.   Psychiatric/Behavioral:  Negative for confusion, dysphoric mood and sleep disturbance. The patient is not nervous/anxious.            Objective:      Vitals:    09/19/24 1511 09/19/24 1523   BP: (!) 140/90 134/82   Pulse: 84    SpO2: 97%    Weight: 85.6 kg (188 lb 12.8 oz)    Height: 5' 9.29" (1.76 m)      Physical Exam  Vitals and nursing note reviewed.   Constitutional:       General: She is not in acute distress.     Appearance: Normal appearance. She is well-developed. She is not toxic-appearing.   HENT:      Head: Normocephalic and atraumatic.      Right Ear: " Tympanic membrane and external ear normal.      Left Ear: Tympanic membrane and external ear normal.      Nose: Nose normal.      Mouth/Throat:      Pharynx: Oropharynx is clear.   Eyes:      Pupils: Pupils are equal, round, and reactive to light.   Neck:      Thyroid: No thyromegaly.      Vascular: No carotid bruit.   Cardiovascular:      Rate and Rhythm: Normal rate and regular rhythm.      Heart sounds: Normal heart sounds. No murmur heard.  Pulmonary:      Effort: Pulmonary effort is normal.      Breath sounds: Normal breath sounds. No wheezing or rales.   Abdominal:      General: Bowel sounds are normal. There is no distension.      Palpations: Abdomen is soft.      Tenderness: There is no abdominal tenderness.   Musculoskeletal:         General: No tenderness or deformity. Normal range of motion.      Cervical back: Normal range of motion and neck supple.      Lumbar back: Normal. No spasms.      Comments: Bends 90 degrees at  waist, shoulders and knees good range of motion without crepitance.  No pitting edema to lower extremities   Lymphadenopathy:      Cervical: No cervical adenopathy.   Skin:     General: Skin is warm and dry.      Findings: No rash.   Neurological:      Mental Status: She is alert and oriented to person, place, and time. Mental status is at baseline.      Cranial Nerves: No cranial nerve deficit.      Coordination: Coordination normal.   Psychiatric:         Mood and Affect: Mood normal.         Behavior: Behavior normal.         Thought Content: Thought content normal.         Judgment: Judgment normal.           Assessment:       1. Diabetes mellitus due to underlying condition with hyperglycemia, without long-term current use of insulin    2. Mixed hyperlipidemia    3. Osteoarthritis of multiple joints, unspecified osteoarthritis type    4. Chronic midline low back pain without sciatica    5. Encounter for screening mammogram for malignant neoplasm of breast    6. Special screening for  malignant neoplasms, colon         Plan:       Diabetes mellitus due to underlying condition with hyperglycemia, without long-term current use of insulin  A1C controlled today. Continue current regimen.  Continue to monitor fasting blood sugars at home, and bring glucose diary to each visit. Stay away from sweets and high carb foods such as pasta, rice, bread, etc., and maintain healthy diet and exercise. we will repeat A1C, lipids and renal function per recommendations.  Discussed need for annual eye exam, and routine inspection of feet.     Mixed hyperlipidemia  Dyslipidemia:   - Controlled    - On statin per ACC recommendations, will continue. No untoward side effects reported.   - Monitor LDL yearly at minimum  -     rosuvastatin (CRESTOR) 20 MG tablet; Take 1 tablet (20 mg total) by mouth once daily.  Dispense: 90 tablet; Refill: 3    Osteoarthritis of multiple joints, unspecified osteoarthritis type  Discussed medication's mechanism of action, side effects, and contraindications.   -     celecoxib (CELEBREX) 200 MG capsule; Take 1 capsule (200 mg total) by mouth daily as needed for Pain (arthritis).  Dispense: 30 capsule; Refill: 2    Chronic midline low back pain without sciatica  Discussed medication's mechanism of action, side effects, and contraindications.   -     cyclobenzaprine (FLEXERIL) 10 MG tablet; Take 1 tablet (10 mg total) by mouth nightly as needed for Muscle spasms.  Dispense: 30 tablet; Refill: 2    Encounter for screening mammogram for malignant neoplasm of breast  Patient of appropriate population group due for screening test.   -     Mammo Digital Screening Bilat w/ Tay; Future; Expected date: 09/19/2024    Special screening for malignant neoplasms, colon  Patient of appropriate population group due for screening test.   -     Cologuard Screening (Multitarget Stool DNA); Future      Follow up in about 6 months (around 3/19/2025) for Diabetic Check Up, HLD.        10/6/2024 Kellen  Alberts

## 2024-10-05 ENCOUNTER — OFFICE VISIT (OUTPATIENT)
Dept: URGENT CARE | Facility: CLINIC | Age: 64
End: 2024-10-05
Payer: COMMERCIAL

## 2024-10-05 VITALS
WEIGHT: 188 LBS | HEART RATE: 92 BPM | HEIGHT: 69 IN | RESPIRATION RATE: 18 BRPM | SYSTOLIC BLOOD PRESSURE: 126 MMHG | BODY MASS INDEX: 27.85 KG/M2 | OXYGEN SATURATION: 96 % | TEMPERATURE: 98 F | DIASTOLIC BLOOD PRESSURE: 81 MMHG

## 2024-10-05 DIAGNOSIS — R31.9 HEMATURIA, UNSPECIFIED TYPE: ICD-10-CM

## 2024-10-05 DIAGNOSIS — R30.0 DYSURIA: Primary | ICD-10-CM

## 2024-10-05 LAB
BILIRUB UR QL STRIP: POSITIVE
GLUCOSE UR QL STRIP: POSITIVE
KETONES UR QL STRIP: POSITIVE
LEUKOCYTE ESTERASE UR QL STRIP: POSITIVE
PH, POC UA: 5
POC BLOOD, URINE: POSITIVE
POC NITRATES, URINE: POSITIVE
PROT UR QL STRIP: POSITIVE
SP GR UR STRIP: 1.03 (ref 1–1.03)
UROBILINOGEN UR STRIP-ACNC: 8 (ref 0.1–1.1)

## 2024-10-05 PROCEDURE — 81003 URINALYSIS AUTO W/O SCOPE: CPT | Mod: QW,S$GLB,, | Performed by: NURSE PRACTITIONER

## 2024-10-05 PROCEDURE — 99214 OFFICE O/P EST MOD 30 MIN: CPT | Mod: S$GLB,,, | Performed by: NURSE PRACTITIONER

## 2024-10-05 RX ORDER — FLUCONAZOLE 150 MG/1
150 TABLET ORAL DAILY
Qty: 2 TABLET | Refills: 0 | Status: SHIPPED | OUTPATIENT
Start: 2024-10-05

## 2024-10-05 RX ORDER — SULFAMETHOXAZOLE AND TRIMETHOPRIM 800; 160 MG/1; MG/1
1 TABLET ORAL 2 TIMES DAILY
Qty: 14 TABLET | Refills: 0 | Status: SHIPPED | OUTPATIENT
Start: 2024-10-05 | End: 2024-10-12

## 2024-10-05 NOTE — PROGRESS NOTES
"Subjective:      Patient ID: Savanna West is a 64 y.o. female.    Vitals:  height is 5' 9" (1.753 m) and weight is 85.3 kg (188 lb). Her temperature is 97.7 °F (36.5 °C). Her blood pressure is 126/81 and her pulse is 92. Her respiration is 18 and oxygen saturation is 96%.     Chief Complaint: Dysuria    Dysuria, hematuria, and lbp. No nvd. Holds her bladder.     Dysuria   This is a new problem. The current episode started in the past 7 days (x 3 days). The problem has been gradually worsening. The quality of the pain is described as burning. Associated symptoms include flank pain, frequency and urgency.       Genitourinary:  Positive for dysuria, frequency, urgency and flank pain.      Objective:     Physical Exam   HENT:   Head: Normocephalic.   Abdominal: Normal appearance. Soft. flat abdomen There is no left CVA tenderness and no right CVA tenderness.   Neurological: She is alert.   Vitals reviewed.      Assessment:     1. Dysuria    2. Hematuria, unspecified type        Plan:       Dysuria  -     POCT Urinalysis, Dipstick, Manual, W/O Scope  -     CULTURE, URINE    Hematuria, unspecified type    Other orders  -     sulfamethoxazole-trimethoprim 800-160mg (BACTRIM DS) 800-160 mg Tab; Take 1 tablet by mouth 2 (two) times daily. for 7 days  Dispense: 14 tablet; Refill: 0  -     fluconazole (DIFLUCAN) 150 MG Tab; Take 1 tablet (150 mg total) by mouth once daily.  Dispense: 2 tablet; Refill: 0                  POCT Urine of little value color interference with azo.   Bactrim empirically as above  Diflucan empirically.   "

## 2024-10-07 ENCOUNTER — TELEPHONE (OUTPATIENT)
Dept: FAMILY MEDICINE | Facility: CLINIC | Age: 64
End: 2024-10-07
Payer: COMMERCIAL

## 2024-10-07 RX ORDER — PHENAZOPYRIDINE HYDROCHLORIDE 200 MG/1
200 TABLET, FILM COATED ORAL 3 TIMES DAILY PRN
Qty: 15 TABLET | Refills: 0 | Status: SHIPPED | OUTPATIENT
Start: 2024-10-07 | End: 2024-10-12

## 2024-10-07 NOTE — TELEPHONE ENCOUNTER
----- Message from Kellen sent at 10/7/2024  7:02 AM CDT -----  - 10/4-1:14 pm- pt has a uti and would like antibiotics and pain meds   [phone]

## 2024-10-07 NOTE — TELEPHONE ENCOUNTER
Spoke with pt states she is still experiencing pain. She would like something for the pain. UC already sent antibiotic

## 2024-10-08 LAB — NONINV COLON CA DNA+OCC BLD SCRN STL QL: NEGATIVE

## 2024-10-09 ENCOUNTER — TELEPHONE (OUTPATIENT)
Dept: FAMILY MEDICINE | Facility: CLINIC | Age: 64
End: 2024-10-09
Payer: COMMERCIAL

## 2024-10-09 LAB
BACTERIA UR CULT: NO GROWTH
BACTERIA UR CULT: NORMAL

## 2024-10-09 NOTE — TELEPHONE ENCOUNTER
----- Message from JUANI Soliz sent at 10/9/2024 10:36 AM CDT -----  Good news, cologuard negative. Repeat in 3 years.

## 2024-11-11 DIAGNOSIS — E08.65 DIABETES MELLITUS DUE TO UNDERLYING CONDITION WITH HYPERGLYCEMIA, WITHOUT LONG-TERM CURRENT USE OF INSULIN: Primary | ICD-10-CM

## 2024-11-11 NOTE — TELEPHONE ENCOUNTER
----- Message from Justyna sent at 11/11/2024  2:24 PM CST -----  Refill on ozempic. Pt asking for 3 months supply?  [facility] [place]   [phone]

## 2025-02-04 ENCOUNTER — TELEPHONE (OUTPATIENT)
Dept: FAMILY MEDICINE | Facility: CLINIC | Age: 65
End: 2025-02-04
Payer: COMMERCIAL

## 2025-02-04 NOTE — TELEPHONE ENCOUNTER
----- Message from Juan M Worley sent at 2/4/2025  4:46 PM CST -----    ----- Message -----  From: Maggie Preston  Sent: 2/4/2025   4:45 PM CST  To: Anselmo Lugo Staff     4:08   Refill   Ozempiic. She has not had this filled since September. She was having insurance  issues. She is ready to get back on it.[facility] on [address]. pt's #  [phone] GH

## 2025-02-24 ENCOUNTER — TELEPHONE (OUTPATIENT)
Dept: FAMILY MEDICINE | Facility: CLINIC | Age: 65
End: 2025-02-24
Payer: COMMERCIAL

## 2025-02-24 NOTE — TELEPHONE ENCOUNTER
----- Message from Shira sent at 2/24/2025  4:12 PM CST -----  Vm:Pt calling back Ruby regarding a PA for ozempic.[phone]

## 2025-02-24 NOTE — TELEPHONE ENCOUNTER
LMOR for pt to call back if she had called back after we had spoken. Please disregard if she had called before we had spoken.

## 2025-02-24 NOTE — TELEPHONE ENCOUNTER
----- Message from Nurse Jaquez sent at 2/24/2025  1:52 PM CST -----    ----- Message -----  From: Kellen Parekh  Sent: 2/24/2025  12:02 PM CST  To: Anselmo Lugo Staff    Vm- 11:36- pt new insurance company faxed a form for her ozempic over a week ago. Would like to know the status [phone]

## 2025-03-03 ENCOUNTER — TELEPHONE (OUTPATIENT)
Dept: FAMILY MEDICINE | Facility: CLINIC | Age: 65
End: 2025-03-03
Payer: COMMERCIAL

## 2025-03-03 NOTE — TELEPHONE ENCOUNTER
----- Message from Nurse Mary Ann sent at 2/24/2025  4:15 PM CST -----  Please call pt and inform her if insurance document for her Ozempic has not been faxed to our office.

## 2025-03-03 NOTE — TELEPHONE ENCOUNTER
Left detailed message to inform pt we have not received insurance form for medication. Call back if any questions.

## 2025-03-06 ENCOUNTER — TELEPHONE (OUTPATIENT)
Dept: FAMILY MEDICINE | Facility: CLINIC | Age: 65
End: 2025-03-06
Payer: COMMERCIAL

## 2025-03-06 DIAGNOSIS — E08.65 DIABETES MELLITUS DUE TO UNDERLYING CONDITION WITH HYPERGLYCEMIA, WITHOUT LONG-TERM CURRENT USE OF INSULIN: Primary | ICD-10-CM

## 2025-03-06 DIAGNOSIS — Z79.899 ENCOUNTER FOR LONG-TERM (CURRENT) USE OF HIGH-RISK MEDICATION: ICD-10-CM

## 2025-03-06 DIAGNOSIS — E78.2 MIXED HYPERLIPIDEMIA: ICD-10-CM

## 2025-03-16 ENCOUNTER — PATIENT MESSAGE (OUTPATIENT)
Dept: FAMILY MEDICINE | Facility: CLINIC | Age: 65
End: 2025-03-16
Payer: COMMERCIAL

## 2025-03-19 ENCOUNTER — OFFICE VISIT (OUTPATIENT)
Dept: FAMILY MEDICINE | Facility: CLINIC | Age: 65
End: 2025-03-19
Payer: COMMERCIAL

## 2025-03-19 VITALS
HEIGHT: 69 IN | WEIGHT: 195.19 LBS | HEART RATE: 79 BPM | DIASTOLIC BLOOD PRESSURE: 80 MMHG | BODY MASS INDEX: 28.91 KG/M2 | SYSTOLIC BLOOD PRESSURE: 120 MMHG | OXYGEN SATURATION: 96 %

## 2025-03-19 DIAGNOSIS — Z82.49 FAMILY HISTORY OF PULMONARY EMBOLISM: ICD-10-CM

## 2025-03-19 DIAGNOSIS — L08.9 SKIN INFECTION: ICD-10-CM

## 2025-03-19 DIAGNOSIS — E08.65 DIABETES MELLITUS DUE TO UNDERLYING CONDITION WITH HYPERGLYCEMIA, WITHOUT LONG-TERM CURRENT USE OF INSULIN: Primary | ICD-10-CM

## 2025-03-19 DIAGNOSIS — Z12.31 ENCOUNTER FOR SCREENING MAMMOGRAM FOR MALIGNANT NEOPLASM OF BREAST: ICD-10-CM

## 2025-03-19 DIAGNOSIS — E78.2 MIXED HYPERLIPIDEMIA: ICD-10-CM

## 2025-03-19 DIAGNOSIS — Z86.718 PERSONAL HISTORY OF DVT (DEEP VEIN THROMBOSIS): ICD-10-CM

## 2025-03-19 DIAGNOSIS — Z01.419 ENCOUNTER FOR ANNUAL ROUTINE GYNECOLOGICAL EXAMINATION: ICD-10-CM

## 2025-03-19 DIAGNOSIS — R73.09 HEMOGLOBIN A1C GREATER THAN 8.0%: ICD-10-CM

## 2025-03-19 LAB — HBA1C MFR BLD: 8.4 %

## 2025-03-19 PROCEDURE — 3074F SYST BP LT 130 MM HG: CPT | Mod: CPTII,S$GLB,,

## 2025-03-19 PROCEDURE — 1160F RVW MEDS BY RX/DR IN RCRD: CPT | Mod: CPTII,S$GLB,,

## 2025-03-19 PROCEDURE — 1159F MED LIST DOCD IN RCRD: CPT | Mod: CPTII,S$GLB,,

## 2025-03-19 PROCEDURE — 3079F DIAST BP 80-89 MM HG: CPT | Mod: CPTII,S$GLB,,

## 2025-03-19 PROCEDURE — 3008F BODY MASS INDEX DOCD: CPT | Mod: CPTII,S$GLB,,

## 2025-03-19 PROCEDURE — 99214 OFFICE O/P EST MOD 30 MIN: CPT | Mod: S$GLB,,,

## 2025-03-19 PROCEDURE — 3052F HG A1C>EQUAL 8.0%<EQUAL 9.0%: CPT | Mod: CPTII,S$GLB,,

## 2025-03-19 PROCEDURE — 83036 HEMOGLOBIN GLYCOSYLATED A1C: CPT | Mod: QW,,,

## 2025-03-19 RX ORDER — ROSUVASTATIN CALCIUM 20 MG/1
20 TABLET, COATED ORAL DAILY
Qty: 90 TABLET | Refills: 3 | Status: SHIPPED | OUTPATIENT
Start: 2025-03-19 | End: 2026-03-19

## 2025-03-19 RX ORDER — SEMAGLUTIDE 0.68 MG/ML
0.25 INJECTION, SOLUTION SUBCUTANEOUS
Qty: 1.5 ML | Refills: 2 | Status: SHIPPED | OUTPATIENT
Start: 2025-03-19 | End: 2025-06-17

## 2025-03-19 RX ORDER — MUPIROCIN 20 MG/G
OINTMENT TOPICAL 3 TIMES DAILY
Qty: 22 G | Refills: 1 | Status: SHIPPED | OUTPATIENT
Start: 2025-03-19

## 2025-03-20 ENCOUNTER — PATIENT MESSAGE (OUTPATIENT)
Dept: ADMINISTRATIVE | Facility: CLINIC | Age: 65
End: 2025-03-20
Payer: COMMERCIAL

## 2025-03-21 ENCOUNTER — TELEPHONE (OUTPATIENT)
Dept: FAMILY MEDICINE | Facility: CLINIC | Age: 65
End: 2025-03-21
Payer: COMMERCIAL

## 2025-03-25 NOTE — PROGRESS NOTES
SUBJECTIVE:    Patient ID: Savanna West is a 64 y.o. female.    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 Cha for family hx blood clots/ last pap smear unknown / gynecology referral / continues to have swelling left ankle with calf tightness//mp)    HPI  History of Present Illness    CHIEF COMPLAINT:  Savanna presents today for follow up    FAMILY HISTORY:  She has a significant family history of blood clots: daughter passed away in early 40s due to pulmonary embolisms, father experienced blood clots during illness, and son had a possible blood clot incident.    MEDICAL HISTORY:  She has a history of blood clots behind her knee at age 42. Additional history includes kidney stones and cholecystectomy.    MEDICATIONS:  She discontinued Ozempic in October due to insurance changes related to job transition and desires to resume treatment.    LABS:  Labs from 6 months ago showed normal cholesterol levels, normal thyroid function, and no proteinuria.      ROS:  General: -fever, -chills, -fatigue, -weight gain, -weight loss  Eyes: -vision changes, -redness, -discharge  ENT: -ear pain, -nasal congestion, -sore throat  Cardiovascular: -chest pain, -palpitations, -lower extremity edema  Respiratory: -cough, -shortness of breath  Gastrointestinal: -abdominal pain, -nausea, -vomiting, -diarrhea, -constipation, -blood in stool  Genitourinary: -dysuria, -hematuria, -frequency  Musculoskeletal: -joint pain, -muscle pain  Skin: -rash, -lesion  Neurological: -headache, -dizziness, -numbness, -tingling  Psychiatric: -anxiety, -depression, -sleep difficulty         Office Visit on 03/19/2025   Component Date Value Ref Range Status    Hemoglobin A1C, POC 03/19/2025 8.4  % Final   Office Visit on 10/05/2024   Component Date Value Ref Range Status    POC Blood, Urine 10/05/2024 Positive (A)   Negative Final    POC Bilirubin, Urine 10/05/2024 Positive (A)  Negative Final    POC Urobilinogen, Urine 10/05/2024 8 (A)  0.1 - 1.1 Final    POC Ketones, Urine 10/05/2024 Positive (A)  Negative Final    POC Protein, Urine 10/05/2024 Positive (A)  Negative Final    POC Nitrates, Urine 10/05/2024 Positive (A)  Negative Final    POC Glucose, Urine 10/05/2024 Positive (A)  Negative Final    pH, UA 10/05/2024 5.0   Final    POC Specific Gravity, Urine 10/05/2024 1.030 (A)  1.003 - 1.029 Final    POC Leukocytes, Urine 10/05/2024 Positive (A)  Negative Final    Urine Culture, Routine 10/05/2024 Final report   Final    Result 1 10/05/2024 No growth   Final   Office Visit on 2024   Component Date Value Ref Range Status    Cologuard Result 10/03/2024 Negative  Negative Final       Past Medical History:   Diagnosis Date    Diabetes mellitus     DVT (deep venous thrombosis)      Social History[1]  Past Surgical History:   Procedure Laterality Date    BREAST BIOPSY Right 1998     SECTION      GALLBLADDER SURGERY  2011    KIDNEY STONE SURGERY  2006    AR DILATION/CURETTAGE,DIAGNOSTIC      D & C times 2    SHOULDER SURGERY Right 2010    Reconstruction Dr. Madrigal    TONSILLECTOMY  1964     Family History   Problem Relation Name Age of Onset    Diabetes Mother Meredith Espino     Heart disease Father Gil Wyatt        The 10-year CVD risk score (ELDER'Agostino, et al., 2008) is: 4.3%    Values used to calculate the score:      Age: 64 years      Sex: Female      Diabetic: No      Tobacco smoker: No      Systolic Blood Pressure: 120 mmHg      Is BP treated: No      HDL Cholesterol: 62 mg/dL      Total Cholesterol: 150 mg/dL    Tests to Keep You Healthy    Mammogram: SCHEDULED  Colon Cancer Screening: Met on 10/3/2024  Cervical Cancer Screening: DUE      Review of patient's allergies indicates:   Allergen Reactions    Opioids - morphine analogues     Codeine Nausea And Vomiting     Current  "Medications[2]    Review of Systems        Objective:      Vitals:    03/19/25 1525   BP: 120/80   Pulse: 79   SpO2: 96%   Weight: 88.5 kg (195 lb 3.2 oz)   Height: 5' 8.5" (1.74 m)     Physical Exam  Physical Exam    Constitutional: In no acute distress. Normal appearance. Well-developed. Not ill-appearing.  HENT: Normocephalic. Atraumatic. External ears normal bilaterally.   Eyes: No scleral icterus. Pupils are equal, round, and reactive to light.  Neck: No thyromegaly. No carotid bruit.  Cardiovascular: Normal rate and regular rhythm.  Normal heart sounds. No murmur heard.  Pulmonary: Pulmonary effort is normal. Normal breath sounds.  Musculoskeletal: Normal range of motion at all joints. Normal range of motion of the cervical back. No lumbar spasms. No lower extremity edema bilaterally.  Skin: Warm. Dry. Capillary refill takes less than 2 seconds.  Neurological: No focal deficit present. Alert and oriented to person, place, and time. No cranial nerve deficit. Gait is intact.  Psychiatric: Attention normal. Mood normal. Speech normal. Behavior is cooperative. No homicidal ideation. No suicidal ideation.           Assessment:       1. Diabetes mellitus due to underlying condition with hyperglycemia, without long-term current use of insulin    2. Encounter for screening mammogram for malignant neoplasm of breast    3. Encounter for annual routine gynecological examination    4. Skin infection    5. Mixed hyperlipidemia    6. Family history of pulmonary embolism    7. Personal history of DVT (deep vein thrombosis)    8. Hemoglobin A1c greater than 8.0%         Plan:       Diabetes mellitus due to underlying condition with hyperglycemia, without long-term current use of insulin  -     Hemoglobin A1C, POCT  -     semaglutide (OZEMPIC) 0.25 mg or 0.5 mg (2 mg/3 mL) pen injector; Inject 0.25 mg into the skin every 7 days.  Dispense: 1.5 mL; Refill: 2  -     Comprehensive Metabolic Panel; Future; Expected date: " 03/19/2025    Encounter for screening mammogram for malignant neoplasm of breast  -     Mammo Digital Screening Bilat w/ Tay (XPD); Future; Expected date: 03/19/2025    Encounter for annual routine gynecological examination  -     Ambulatory referral/consult to Obstetrics / Gynecology; Future; Expected date: 03/26/2025    Skin infection  -     mupirocin (BACTROBAN) 2 % ointment; Apply topically 3 (three) times daily.  Dispense: 22 g; Refill: 1    Mixed hyperlipidemia  -     rosuvastatin (CRESTOR) 20 MG tablet; Take 1 tablet (20 mg total) by mouth once daily.  Dispense: 90 tablet; Refill: 3  -     Comprehensive Metabolic Panel; Future; Expected date: 03/19/2025    Family history of pulmonary embolism  -     Ambulatory referral/consult to Hematology / Oncology; Future; Expected date: 03/26/2025    Personal history of DVT (deep vein thrombosis)  -     Ambulatory referral/consult to Hematology / Oncology; Future; Expected date: 03/26/2025    Hemoglobin A1c greater than 8.0%  -     semaglutide (OZEMPIC) 0.25 mg or 0.5 mg (2 mg/3 mL) pen injector; Inject 0.25 mg into the skin every 7 days.  Dispense: 1.5 mL; Refill: 2        TYPE 2 DIABETES MELLITUS WITH HYPERGLYCEMIA:  - Evaluated the patient's A1C, which is elevated at 8.4, indicating hyperglycemia.  - Considered resuming Ozempic for diabetes management due to elevated A1C.  - Explained the significance of protein in urine for diabetic patients and its relation to renal function.  - Discussed the role of CMP in assessing glucose, electrolytes, liver, and renal function.  - Prescribed Ozempic 0.25 mg (previous dose was 0.5 mg, but patient has been off medication since October) to minimize potential nausea.  - Ordered Complete Metabolic Panel (CMP) at [facility] to monitor liver and renal function.  - Scheduled follow up in 3 months for A1C recheck to assess effectiveness of resumed Ozempic therapy.  - Advised the patient to be cautious with sugar intake, particularly  in iced tea, due to history of kidney stones.  - Noted that current urinalysis shows no protein in urine, indicating stable kidney function.  - Reviewed that kidney function labs (BUN, creatinine, GFR) have always been normal.  - Ordered a metabolic panel to check kidney function.  - Reviewed that liver function tests have always been within normal range.  - Ordered a metabolic panel to check liver function.  - Savanna to be cautious with sugar intake, particularly in iced tea.  - Assessed recent lab results, noting stable cholesterol, thyroid, and renal function.  - Ordered Complete Metabolic Panel (CMP) at [facility] to monitor liver and renal function, coordinating with upcoming hematology workup.  - Complete at the same time as Dr. Multani's labs to avoid multiple blood draws.    COAGULATION DEFECT, UNSPECIFIED:  - Reviewed the patient's family history of blood clots, including daughter's death from pulmonary embolisms, father's history of blood clots, and son's possible incident involving blood clots.  - Discussed the need for evaluation of potential clotting disorders due to family history.  - Referred the patient to Dr. Multani (hematologist) for evaluation of potential clotting disorders.  - Informed the patient that Dr. Multani will order labs including CBC and clotting tests.    PERSONAL HISTORY OF THROMBOPHLEBITIS:  - Reviewed the patient's history of deep vein thrombosis behind the knee at age 42.  - Discussed the need for evaluation of potential clotting disorders due to personal and family history.  - Referred the patient to Dr. Multani (hematologist) for evaluation of potential clotting disorders.  - Informed the patient that Dr. Multani will order labs including CBC and clotting tests.    PERSONAL HISTORY OF OTHER DISEASES OF URINARY SYSTEM:  - Advised the patient to be cautious with sugar intake, particularly in iced tea, due to history of kidney stones.    ACQUIRED ABSENCE OF OTHER  SPECIFIED PARTS OF DIGESTIVE TRACT:  - Noted that the patient no longer has a gallbladder.         Follow up in about 3 months (around 6/19/2025) for Diabetic Check Up.        This note was generated with the assistance of ambient listening technology. Verbal consent was obtained by the patient and accompanying visitor(s) for the recording of patient appointment to facilitate this note. I attest to having reviewed and edited the generated note for accuracy, though some syntax or spelling errors may persist. Please contact the author of this note for any clarification.      3/31/2025 Kellen Alberts         [1]   Social History  Socioeconomic History    Marital status:    Tobacco Use    Smoking status: Never    Smokeless tobacco: Never   Substance and Sexual Activity    Alcohol use: No    Drug use: No    Sexual activity: Yes     Partners: Male     Birth control/protection: Implant     Social Drivers of Health     Physical Activity: Sufficiently Active (12/20/2020)    Exercise Vital Sign     Days of Exercise per Week: 6 days     Minutes of Exercise per Session: 150+ min   Stress: No Stress Concern Present (12/20/2020)    [employer] - Occupational Stress Questionnaire     Feeling of Stress : Only a little   [2]   Current Outpatient Medications:     aspirin (ECOTRIN) 81 MG EC tablet, Take 81 mg by mouth once daily., Disp: , Rfl:     Bifidobacterium infantis (ALIGN) 10.5 mg (10 million cell) Chew, Take 1 tablet by mouth once daily., Disp: , Rfl:     celecoxib (CELEBREX) 200 MG capsule, Take 1 capsule (200 mg total) by mouth daily as needed for Pain (arthritis)., Disp: 30 capsule, Rfl: 2    cholecalciferol, vitamin D3, (VITAMIN D3) 50 mcg (2,000 unit) Tab, Take 1 tablet by mouth once daily. , Disp: , Rfl:     cyclobenzaprine (FLEXERIL) 10 MG tablet, Take 1 tablet (10 mg total) by mouth nightly as needed for Muscle spasms., Disp: 30 tablet, Rfl: 2    fluconazole (DIFLUCAN) 150 MG Tab,  Take 1 tablet (150 mg total) by mouth once daily. (Patient taking differently: Take 150 mg by mouth as needed.), Disp: 2 tablet, Rfl: 0    magnesium 30 mg Tab, Take 30 mg by mouth once. , Disp: , Rfl:     metFORMIN (GLUCOPHAGE) 500 MG tablet, Take 2 tablets (1,000 mg total) by mouth 2 (two) times daily with meals., Disp: 360 tablet, Rfl: 3    mupirocin (BACTROBAN) 2 % ointment, Apply topically 2 (two) times daily. Apply to affected area, Disp: , Rfl:     ondansetron (ZOFRAN) 4 MG tablet, Take 1 tablet (4 mg total) by mouth every 8 (eight) hours as needed for Nausea., Disp: 15 tablet, Rfl: 0    vitamin E 400 UNIT capsule, Take 400 Units by mouth once daily., Disp: , Rfl:     zinc sulfate (ZINC-15 ORAL), Take 1 tablet by mouth once daily. , Disp: , Rfl:     blood sugar diagnostic Strp, 60 strips by Misc.(Non-Drug; Combo Route) route 2 (two) times a day., Disp: 60 strip, Rfl: 0    blood-glucose meter kit, Use as instructed, Disp: 1 each, Rfl: 0    cyanocobalamin-cobamamide 5,000-100 mcg Lozg, Place 1 lozenge under the tongue once daily.  (Patient not taking: Reported on 3/19/2025), Disp: , Rfl:     multivitamin with minerals (HAIR,SKIN AND NAILS ORAL), Take 1 tablet by mouth once daily.  (Patient not taking: Reported on 9/19/2024), Disp: , Rfl:     mupirocin (BACTROBAN) 2 % ointment, Apply topically 3 (three) times daily., Disp: 22 g, Rfl: 1    rosuvastatin (CRESTOR) 20 MG tablet, Take 1 tablet (20 mg total) by mouth once daily., Disp: 90 tablet, Rfl: 3    semaglutide (OZEMPIC) 0.25 mg or 0.5 mg (2 mg/3 mL) pen injector, Inject 0.25 mg into the skin every 7 days., Disp: 1.5 mL, Rfl: 2

## 2025-04-22 ENCOUNTER — TELEPHONE (OUTPATIENT)
Dept: FAMILY MEDICINE | Facility: CLINIC | Age: 65
End: 2025-04-22
Payer: COMMERCIAL

## 2025-04-22 NOTE — TELEPHONE ENCOUNTER
----- Message from Marcie sent at 4/22/2025  2:35 PM CDT -----  PA #[id number] for Ozempic was approved for 4/9/25 - 4/9/26. Thank you

## 2025-04-28 ENCOUNTER — OFFICE VISIT (OUTPATIENT)
Dept: FAMILY MEDICINE | Facility: CLINIC | Age: 65
End: 2025-04-28
Payer: COMMERCIAL

## 2025-04-28 ENCOUNTER — TELEPHONE (OUTPATIENT)
Dept: FAMILY MEDICINE | Facility: CLINIC | Age: 65
End: 2025-04-28

## 2025-04-28 VITALS
BODY MASS INDEX: 29.18 KG/M2 | TEMPERATURE: 98 F | OXYGEN SATURATION: 98 % | SYSTOLIC BLOOD PRESSURE: 138 MMHG | WEIGHT: 197 LBS | HEIGHT: 69 IN | DIASTOLIC BLOOD PRESSURE: 88 MMHG | HEART RATE: 96 BPM

## 2025-04-28 DIAGNOSIS — Z12.31 OTHER SCREENING MAMMOGRAM: ICD-10-CM

## 2025-04-28 DIAGNOSIS — J32.9 BACTERIAL SINUSITIS: ICD-10-CM

## 2025-04-28 DIAGNOSIS — B96.89 BACTERIAL SINUSITIS: ICD-10-CM

## 2025-04-28 DIAGNOSIS — R05.1 ACUTE COUGH: ICD-10-CM

## 2025-04-28 DIAGNOSIS — J06.9 URI WITH COUGH AND CONGESTION: Primary | ICD-10-CM

## 2025-04-28 PROCEDURE — 1159F MED LIST DOCD IN RCRD: CPT | Mod: CPTII,S$GLB,,

## 2025-04-28 PROCEDURE — 1160F RVW MEDS BY RX/DR IN RCRD: CPT | Mod: CPTII,S$GLB,,

## 2025-04-28 PROCEDURE — 99213 OFFICE O/P EST LOW 20 MIN: CPT | Mod: S$GLB,,,

## 2025-04-28 PROCEDURE — 3075F SYST BP GE 130 - 139MM HG: CPT | Mod: CPTII,S$GLB,,

## 2025-04-28 PROCEDURE — 3052F HG A1C>EQUAL 8.0%<EQUAL 9.0%: CPT | Mod: CPTII,S$GLB,,

## 2025-04-28 PROCEDURE — 3079F DIAST BP 80-89 MM HG: CPT | Mod: CPTII,S$GLB,,

## 2025-04-28 PROCEDURE — 3008F BODY MASS INDEX DOCD: CPT | Mod: CPTII,S$GLB,,

## 2025-04-28 RX ORDER — BENZONATATE 200 MG/1
200 CAPSULE ORAL 3 TIMES DAILY PRN
Qty: 30 CAPSULE | Refills: 0 | Status: SHIPPED | OUTPATIENT
Start: 2025-04-28 | End: 2025-05-08

## 2025-04-28 RX ORDER — AMOXICILLIN AND CLAVULANATE POTASSIUM 875; 125 MG/1; MG/1
1 TABLET, FILM COATED ORAL EVERY 12 HOURS
Qty: 14 TABLET | Refills: 0 | Status: SHIPPED | OUTPATIENT
Start: 2025-04-28 | End: 2025-05-05

## 2025-04-28 RX ORDER — PREDNISONE 20 MG/1
20 TABLET ORAL DAILY
Qty: 3 TABLET | Refills: 0 | Status: SHIPPED | OUTPATIENT
Start: 2025-04-28

## 2025-04-28 NOTE — TELEPHONE ENCOUNTER
Spoke with pt. States she's felt exteremly poor last few days. Symptoms are presenting as URI, but last time she felt this bad it was covid. Pt scheduled for appt.

## 2025-04-28 NOTE — TELEPHONE ENCOUNTER
----- Message from Nurse Jaquez sent at 4/28/2025  9:34 AM CDT -----    ----- Message -----  From: Kellen Parekh  Sent: 4/28/2025   9:34 AM CDT  To: [facility] Staff    - 9:30-pt has an upper respiratory infection or covid and would like something called in  [phone]

## 2025-05-01 NOTE — PROGRESS NOTES
"  SUBJECTIVE:    Patient ID: Savanna West is a 64 y.o. female.    Chief Complaint: Diabetes (Mammogram ordered, nausea, neck//headache pain, vertigo, abc )    HPI  History of Present Illness    CHIEF COMPLAINT:  Savanna presents today with sinus symptoms and concern for COVID-19    HISTORY OF PRESENT ILLNESS:  She reports symptoms began Saturday morning at 4:00 with sinus congestion, pressure, and severe headache with light sensitivity described as "migraine proportions". She has experienced crackling sensation and water noises in one ear for about a week, particularly when swallowing or chewing. Additional symptoms include persistent non-productive cough, nausea, and dizziness. She denies fever, chills, or diarrhea. She compares current symptoms to previous COVID-19 infection. She has taken two antihistamines today and used Aleve and Dextromethorphan over the weekend with minimal symptom relief.    MEDICAL HISTORY:  History includes bronchitis, pneumonia, and Type 2 diabetes which developed following steroid treatment during COVID-19 hospitalization.    DIABETES MANAGEMENT:  Last A1C was 8.4, increased from 6.5. Home blood sugar averages 136-138. Currently using Ozempic 2.5 mg with plans to increase to 5 mg weekly. She recently resumed Ozempic after several months discontinuation due to insurance issues.    MEDICATION ALLERGIES:  Z-jaylen is ineffective with symptom relapse one week post-treatment. Augmentin causes tongue soreness and taste bud discomfort without tongue swelling.      ROS:  General: -fever, -chills, -fatigue, -weight gain, -weight loss  Eyes: -vision changes, -redness, -discharge, +photophobia  ENT: -ear pain, +nasal congestion, -sore throat, +voice change, +hoarseness, +ear pressure  Cardiovascular: -chest pain, -palpitations, -lower extremity edema  Respiratory: +cough, -shortness of breath  Gastrointestinal: -abdominal pain, +nausea, -vomiting, -diarrhea, -constipation, -blood in " "stool  Genitourinary: -dysuria, -hematuria, -frequency  Musculoskeletal: -joint pain, -muscle pain  Skin: -rash, -lesion  Neurological: +headache, +dizziness, -numbness, -tingling  Psychiatric: -anxiety, -depression, -sleep difficulty  Head: +sinus pressure, +migraines  Neck: +neck pain         Office Visit on 2025   Component Date Value Ref Range Status    Hemoglobin A1C, POC 2025 8.4  % Final       Past Medical History:   Diagnosis Date    Diabetes mellitus     DVT (deep venous thrombosis)      Social History[1]  Past Surgical History:   Procedure Laterality Date    BREAST BIOPSY Right 1998     SECTION      GALLBLADDER SURGERY  2011    KIDNEY STONE SURGERY  2006    [place] DILATION/CURETTAGE,DIAGNOSTIC      D & C times 2    SHOULDER SURGERY Right 2010    Reconstruction Dr. Madrigal    TONSILLECTOMY  1964     Family History   Problem Relation Name Age of Onset    Diabetes Mother Meredith Espino     Heart disease Father Gil Wyatt        The 10-year CVD risk score (Tony, et al., 2008) is: 6.3%    Values used to calculate the score:      Age: 64 years      Sex: Female      Diabetic: No      Tobacco smoker: No      Systolic Blood Pressure: 138 mmHg      Is BP treated: No      HDL Cholesterol: 62 mg/dL      Total Cholesterol: 150 mg/dL    Tests to Keep You Healthy    Mammogram: ORDERED BUT NOT SCHEDULED  Colon Cancer Screening: Met on 10/3/2024  Cervical Cancer Screening: DUE      Review of patient's allergies indicates:   Allergen Reactions    Opioids - morphine analogues     Codeine Nausea And Vomiting     Current Medications[2]    Review of Systems        Objective:      Vitals:    25 1118   BP: 138/88   Pulse: 96   Temp: 97.8 °F (36.6 °C)   SpO2: 98%   Weight: 89.4 kg (197 lb)   Height: 5' 8.5" (1.74 m)     Physical Exam  Physical Exam    Constitutional: In no acute distress. Normal appearance. Well-developed. Not ill-appearing.  HENT: Normocephalic. Atraumatic. External " ears normal bilaterally. Nose normal. Normal lips. Pharyngeal erythema. Eardrums pushed out. Congestion. Nasal congestion present. Fluid in sinuses.  Eyes: No scleral icterus. Pupils are equal, round, and reactive to light.  Neck: No thyromegaly. No carotid bruit.  Cardiovascular: Normal rate and regular rhythm. Radial pulses are 2+ bilaterally. Normal heart sounds. No murmur heard.  Pulmonary: Pulmonary effort is normal. Normal breath sounds. No wheezing.  Abdomen: Bowel sounds are normal. Abdomen is soft. No abdominal tenderness.  Musculoskeletal: Normal range of motion at all joints. Normal range of motion of the cervical back. No lumbar spasms. No lower extremity edema bilaterally.  Skin: Warm. Dry. Capillary refill takes less than 2 seconds.  Neurological: No focal deficit present. Alert and oriented to person, place, and time. No cranial nerve deficit. Sensation intact. No motor weakness. Gait is intact.  Psychiatric: Attention normal. Mood normal. Speech normal. Behavior is cooperative. No homicidal ideation. No suicidal ideation.           Assessment:       1. URI with cough and congestion    2. Bacterial sinusitis    3. Acute cough    4. Other screening mammogram         Plan:       URI with cough and congestion  -     predniSONE (DELTASONE) 20 MG tablet; Take 1 tablet (20 mg total) by mouth once daily.  Dispense: 3 tablet; Refill: 0  -     amoxicillin-clavulanate 875-125mg (AUGMENTIN) 875-125 mg per tablet; Take 1 tablet by mouth every 12 (twelve) hours. for 7 days  Dispense: 14 tablet; Refill: 0  -     benzonatate (TESSALON) 200 MG capsule; Take 1 capsule (200 mg total) by mouth 3 (three) times daily as needed for Cough.  Dispense: 30 capsule; Refill: 0    Bacterial sinusitis  -     predniSONE (DELTASONE) 20 MG tablet; Take 1 tablet (20 mg total) by mouth once daily.  Dispense: 3 tablet; Refill: 0  -     amoxicillin-clavulanate 875-125mg (AUGMENTIN) 875-125 mg per tablet; Take 1 tablet by mouth every 12  (twelve) hours. 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  - Assessed for potential sinus infection and bronchitis based on symptoms and exam.  - Started prednisone (oral steroids) for inflammation, sinus congestion, and headache, choosing oral over injection due to potential impact on glucose levels.  - Started Augmentin for sinus and potential ear infection, choosing it over Zithromax due to history of ineffective treatment with Z-jaylen.  - Evaluated need for inhaler, determining it unnecessary at present due to absence of wheezing.    TYPE 2 DIABETES MELLITUS WITH OTHER SPECIFIED COMPLICATION:  - Continue Ozempic 0.5 mg weekly for diabetes management.  - Monitor the patient's average blood sugar at home, currently 136-138 when adhering to the recommended diet.  - Savanna's last HbA1c was 8.4, increased from a previous 6.5.    ACUTE SINUSITIS:  - Prescribe Augmentin for sinus infection and prednisone (oral steroids) for inflammation, sinus congestion, and headache.  - Savanna reports pressure in sinuses, headache, and photosensitivity.  - Examination reveals fluid in sinuses and congestion.  - Assess the condition as a chronic sinus infection.    ACUTE FRONTAL SINUSITIS:  - Savanna reports pressure in the frontal area.    OTITIS MEDIA:  - Prescribe Augmentin for potential ear infection.  - Savanna reports  crackling noise in ear for approximately 1 week.  - Examination shows fluid pushing on tympanic membranes.    ACUTE PHARYNGITIS:  - Prescribe Augmentin for treatment.  - Recommend OTC Mucinex DM tablets for cough relief.  - Savanna reports dysphonia and pharyngalgia.  - Examination reveals erythema in the posterior pharynx.    POST COVID-19 CONDITION:  - Rule out active COVID-19 infection through in-office testing.  - Note the patient's history of previous COVID-19 infection and hospitalization.    HISTORY OF RECURRENT PNEUMONIA:  - Note the patient's history of recurrent pneumonia.  - Instruct the patient to contact the office if wheezing develops, for potential bronchodilator prescription.    ANTIBIOTIC ALLERGY/INTOLERANCE:  - Note that the patient reports azithromycin ineffectiveness.  - Discuss the importance of not holding Augmentin in the oral cavity for prolonged periods to avoid lingual discomfort.         Follow up if symptoms worsen or fail to improve.        This note was generated with the assistance of ambient listening technology. Verbal consent was obtained by the patient and accompanying visitor(s) for the recording of patient appointment to facilitate this note. I attest to having reviewed and edited the generated note for accuracy, though some syntax or spelling errors may persist. Please contact the author of this note for any clarification.      4/30/2025 Kellen Alberts         [1]   Social History  Socioeconomic History    Marital status:    Tobacco Use    Smoking status: Never    Smokeless tobacco: Never   Substance and Sexual Activity    Alcohol use: No    Drug use: No    Sexual activity: Yes     Partners: Male     Birth control/protection: Implant     Social Drivers of Health     Physical Activity: Sufficiently Active (12/20/2020)    Exercise Vital Sign     Days of Exercise per Week: 6 days     Minutes of Exercise per Session: 150+ min   Stress: No Stress Concern Present (12/20/2020)     [facility] - Occupational Stress Questionnaire     Feeling of Stress : Only a little   [2]   Current Outpatient Medications:     amoxicillin-clavulanate 875-125mg (AUGMENTIN) 875-125 mg per tablet, Take 1 tablet by mouth every 12 (twelve) hours. for 7 days, Disp: 14 tablet, Rfl: 0    aspirin (ECOTRIN) 81 MG EC tablet, Take 81 mg by mouth once daily., Disp: , Rfl:     benzonatate (TESSALON) 200 MG capsule, Take 1 capsule (200 mg total) by mouth 3 (three) times daily as needed for Cough., Disp: 30 capsule, Rfl: 0    Bifidobacterium infantis (ALIGN) 10.5 mg (10 million cell) Chew, Take 1 tablet by mouth once daily., Disp: , Rfl:     blood sugar diagnostic Strp, 60 strips by Misc.(Non-Drug; Combo Route) route 2 (two) times a day., Disp: 60 strip, Rfl: 0    blood-glucose meter kit, Use as instructed, Disp: 1 each, Rfl: 0    celecoxib (CELEBREX) 200 MG capsule, Take 1 capsule (200 mg total) by mouth daily as needed for Pain (arthritis)., Disp: 30 capsule, Rfl: 2    cholecalciferol, vitamin D3, (VITAMIN D3) 50 mcg (2,000 unit) Tab, Take 1 tablet by mouth once daily. , Disp: , Rfl:     cyanocobalamin-cobamamide 5,000-100 mcg Lozg, Place 1 lozenge under the tongue once daily.  (Patient not taking: Reported on 3/19/2025), Disp: , Rfl:     cyclobenzaprine (FLEXERIL) 10 MG tablet, Take 1 tablet (10 mg total) by mouth nightly as needed for Muscle spasms. (Patient not taking: Reported on 4/28/2025), Disp: 30 tablet, Rfl: 2    fluconazole (DIFLUCAN) 150 MG Tab, Take 1 tablet (150 mg total) by mouth once daily. (Patient not taking: Reported on 4/28/2025), Disp: 2 tablet, Rfl: 0    magnesium 30 mg Tab, Take 30 mg by mouth once. , Disp: , Rfl:     metFORMIN (GLUCOPHAGE) 500 MG tablet, Take 2 tablets (1,000 mg total) by mouth 2 (two) times daily with meals., Disp: 360 tablet, Rfl: 3    multivitamin with minerals (HAIR,SKIN AND NAILS ORAL), Take 1 tablet by mouth once daily.  (Patient not taking:  Reported on 9/19/2024), Disp: , Rfl:     mupirocin (BACTROBAN) 2 % ointment, Apply topically 2 (two) times daily. Apply to affected area, Disp: , Rfl:     mupirocin (BACTROBAN) 2 % ointment, Apply topically 3 (three) times daily., Disp: 22 g, Rfl: 1    ondansetron (ZOFRAN) 4 MG tablet, Take 1 tablet (4 mg total) by mouth every 8 (eight) hours as needed for Nausea., Disp: 15 tablet, Rfl: 0    predniSONE (DELTASONE) 20 MG tablet, Take 1 tablet (20 mg total) by mouth once daily., Disp: 3 tablet, Rfl: 0    rosuvastatin (CRESTOR) 20 MG tablet, Take 1 tablet (20 mg total) by mouth once daily., Disp: 90 tablet, Rfl: 3    semaglutide (OZEMPIC) 0.25 mg or 0.5 mg (2 mg/3 mL) pen injector, Inject 0.25 mg into the skin every 7 days., Disp: 1.5 mL, Rfl: 2    vitamin E 400 UNIT capsule, Take 400 Units by mouth once daily., Disp: , Rfl:     zinc sulfate (ZINC-15 ORAL), Take 1 tablet by mouth once daily. , Disp: , Rfl:

## 2025-07-02 ENCOUNTER — OFFICE VISIT (OUTPATIENT)
Dept: FAMILY MEDICINE | Facility: CLINIC | Age: 65
End: 2025-07-02
Payer: COMMERCIAL

## 2025-07-02 VITALS
DIASTOLIC BLOOD PRESSURE: 88 MMHG | WEIGHT: 187.63 LBS | SYSTOLIC BLOOD PRESSURE: 130 MMHG | HEIGHT: 69 IN | BODY MASS INDEX: 27.79 KG/M2 | HEART RATE: 93 BPM

## 2025-07-02 DIAGNOSIS — Z13.220 ENCOUNTER FOR LIPID SCREENING FOR CARDIOVASCULAR DISEASE: ICD-10-CM

## 2025-07-02 DIAGNOSIS — Z12.31 ENCOUNTER FOR SCREENING MAMMOGRAM FOR MALIGNANT NEOPLASM OF BREAST: ICD-10-CM

## 2025-07-02 DIAGNOSIS — Z00.00 WELLNESS EXAMINATION: Primary | ICD-10-CM

## 2025-07-02 DIAGNOSIS — Z13.6 ENCOUNTER FOR LIPID SCREENING FOR CARDIOVASCULAR DISEASE: ICD-10-CM

## 2025-07-02 DIAGNOSIS — E08.65 DIABETES MELLITUS DUE TO UNDERLYING CONDITION WITH HYPERGLYCEMIA, WITHOUT LONG-TERM CURRENT USE OF INSULIN: ICD-10-CM

## 2025-07-02 DIAGNOSIS — Z87.442 HISTORY OF KIDNEY STONES: ICD-10-CM

## 2025-07-02 LAB — HBA1C MFR BLD: 7.3 %

## 2025-07-02 RX ORDER — TAMSULOSIN HYDROCHLORIDE 0.4 MG/1
0.4 CAPSULE ORAL DAILY
Qty: 30 CAPSULE | Refills: 1 | Status: SHIPPED | OUTPATIENT
Start: 2025-07-02

## 2025-07-02 RX ORDER — SEMAGLUTIDE 0.68 MG/ML
0.5 INJECTION, SOLUTION SUBCUTANEOUS
COMMUNITY
Start: 2025-06-29

## 2025-07-02 RX ORDER — KETOROLAC TROMETHAMINE 10 MG/1
10 TABLET, FILM COATED ORAL EVERY 6 HOURS PRN
Qty: 20 TABLET | Refills: 0 | Status: SHIPPED | OUTPATIENT
Start: 2025-07-02 | End: 2025-07-07

## 2025-07-02 NOTE — PATIENT INSTRUCTIONS
Constipation Counseling  contributing factors to constipation: not enough water, low fiber diet and lack of exercise.  Recommend daily exercise as tolerated, adequate water intake (eight 8-oz glasses of water daily), and high fiber diet. OTC fiber supplements are recommended if diet does not reach daily fiber goal (20-30 grams daily), such as Metamucil, Citrucel, or FiberCon (take as directed, separate from other oral medications by >2 hours). Fiber can be taken in pill form, chewable form, or powder form, based on your preference.  Stool softeners can be used for prevention of constipation but will not typically relieve acute constipation alone. Stool softeners include docusate sodium (Colace), Sennakot, and Miralax.  Laxatives can be used to relieve acute constipation. Stimulant laxatives should not be used daily. This can cause your digestive system to become dependent upon these medications to produce a bowel movement. Laxatives that contain stimulants include Dulcolax, (both oral and rectal suppository), Ex-Lax, Correctol, etc.   Non-stimulant laxatives include Milk of Magnesia, and glycerin suppositories.        I sent ketorolac to your pharmacy for as needed if you get an acute kidney stone episode. Be cautious and do not take other NSAIDs like ibuprofen, celecoxib, naproxen, while taking this.   Start the tamsulosin at bedtime as well, to help promote passing the stone.

## 2025-07-03 ENCOUNTER — RESULTS FOLLOW-UP (OUTPATIENT)
Dept: FAMILY MEDICINE | Facility: CLINIC | Age: 65
End: 2025-07-03

## 2025-07-03 LAB
ALBUMIN SERPL-MCNC: 4.4 G/DL (ref 3.6–5.1)
ALBUMIN/CREAT UR: 36 MG/G CREAT
ALBUMIN/GLOB SERPL: 1.9 (CALC) (ref 1–2.5)
ALP SERPL-CCNC: 68 U/L (ref 37–153)
ALT SERPL-CCNC: 33 U/L (ref 6–29)
AST SERPL-CCNC: 30 U/L (ref 10–35)
BASOPHILS # BLD AUTO: 29 CELLS/UL (ref 0–200)
BASOPHILS NFR BLD AUTO: 0.5 %
BILIRUB SERPL-MCNC: 0.5 MG/DL (ref 0.2–1.2)
BUN SERPL-MCNC: 13 MG/DL (ref 7–25)
BUN/CREAT SERPL: ABNORMAL (CALC) (ref 6–22)
CALCIUM SERPL-MCNC: 9.6 MG/DL (ref 8.6–10.4)
CHLORIDE SERPL-SCNC: 102 MMOL/L (ref 98–110)
CHOLEST SERPL-MCNC: 110 MG/DL
CHOLEST/HDLC SERPL: 2.6 (CALC)
CO2 SERPL-SCNC: 27 MMOL/L (ref 20–32)
CREAT SERPL-MCNC: 0.74 MG/DL (ref 0.5–1.05)
CREAT UR-MCNC: 229 MG/DL (ref 20–275)
EGFR: 90 ML/MIN/1.73M2
EOSINOPHIL # BLD AUTO: 52 CELLS/UL (ref 15–500)
EOSINOPHIL NFR BLD AUTO: 0.9 %
ERYTHROCYTE [DISTWIDTH] IN BLOOD BY AUTOMATED COUNT: 12.2 % (ref 11–15)
GLOBULIN SER CALC-MCNC: 2.3 G/DL (CALC) (ref 1.9–3.7)
GLUCOSE SERPL-MCNC: 132 MG/DL (ref 65–99)
HCT VFR BLD AUTO: 42.6 % (ref 35–45)
HDLC SERPL-MCNC: 43 MG/DL
HGB BLD-MCNC: 13.6 G/DL (ref 11.7–15.5)
LDLC SERPL CALC-MCNC: 48 MG/DL (CALC)
LYMPHOCYTES # BLD AUTO: 2175 CELLS/UL (ref 850–3900)
LYMPHOCYTES NFR BLD AUTO: 37.5 %
MCH RBC QN AUTO: 30.8 PG (ref 27–33)
MCHC RBC AUTO-ENTMCNC: 31.9 G/DL (ref 32–36)
MCV RBC AUTO: 96.4 FL (ref 80–100)
MICROALBUMIN UR-MCNC: 8.2 MG/DL
MONOCYTES # BLD AUTO: 360 CELLS/UL (ref 200–950)
MONOCYTES NFR BLD AUTO: 6.2 %
NEUTROPHILS # BLD AUTO: 3184 CELLS/UL (ref 1500–7800)
NEUTROPHILS NFR BLD AUTO: 54.9 %
NONHDLC SERPL-MCNC: 67 MG/DL (CALC)
PLATELET # BLD AUTO: 291 THOUSAND/UL (ref 140–400)
PMV BLD REES-ECKER: 10.4 FL (ref 7.5–12.5)
POTASSIUM SERPL-SCNC: 4.4 MMOL/L (ref 3.5–5.3)
PROT SERPL-MCNC: 6.7 G/DL (ref 6.1–8.1)
RBC # BLD AUTO: 4.42 MILLION/UL (ref 3.8–5.1)
SODIUM SERPL-SCNC: 138 MMOL/L (ref 135–146)
TRIGL SERPL-MCNC: 107 MG/DL
TSH SERPL-ACNC: 1.37 MIU/L (ref 0.4–4.5)
WBC # BLD AUTO: 5.8 THOUSAND/UL (ref 3.8–10.8)

## 2025-07-03 NOTE — PROGRESS NOTES
SUBJECTIVE:    Patient ID: Savanna West is a 64 y.o. female.    Chief Complaint: Follow-up (Diabetes check up; no bottles; patient stated that she is having trouble with bowel movements and lower back; has lower back pain; patient states that she thinks she has a kidney stone.)    HPI  History of Present Illness    CHIEF COMPLAINT:  Savanna presents today for follow up of diabetes management and reports recent constipation.    DIABETES MANAGEMENT:  She reports improvement in diabetes management with A1C reduced from 8.4 to 7.3. She continues Ozempic 0.5 mg and has experienced weight loss of 10 lbs, decreasing from 197 to 180 lbs with noticeable changes in clothing fit.    GI CONCERNS:  She reports recent onset constipation which is unusual for her, accompanied by nausea and vomiting that resolved spontaneously on Saturday. She attributes these symptoms to work-related stress. She has no active symptoms at time of visit.     DIET:  She typically consumes her main meal after 8:00 PM. She manages reflux through dietary modifications, including avoiding acidic foods, and currently includes watermelon as a snack.    KIDNEY STONE HISTORY:  She has a history of multiple kidney stones. Her first episode presented as an infection with two stones blocking the urinary tract. A second episode occurred two years ago with severe pain initially mistaken for muscle strain. She currently reports possible early signs of kidney stones including mild discomfort when lying on one side and intermittent changes in urinary flow strength, but denies acute pain.      ROS:  General: -fever, -chills, -fatigue, -weight gain, -weight loss  Eyes: -vision changes, -redness, -discharge  ENT: -ear pain, -nasal congestion, -sore throat  Cardiovascular: -chest pain, -palpitations, -lower extremity edema  Respiratory: -cough, -shortness of breath  Gastrointestinal: -abdominal pain, +nausea, -vomiting, -diarrhea, +constipation, -blood in  stool  Genitourinary: -dysuria, -hematuria, -frequency, +flank pain, +urine changes, +difficulty urinating, +abnormal urine appearance, +shooting pain sensation  Musculoskeletal: -joint pain, -muscle pain  Skin: -rash, -lesion  Neurological: -headache, -dizziness, -numbness, -tingling  Psychiatric: -anxiety, -depression, -sleep difficulty         Office Visit on 07/02/2025   Component Date Value Ref Range Status    Hemoglobin A1C, POC 07/02/2025 7.3  % Final    WBC 07/02/2025 5.8  3.8 - 10.8 Thousand/uL Final    RBC 07/02/2025 4.42  3.80 - 5.10 Million/uL Final    Hemoglobin 07/02/2025 13.6  11.7 - 15.5 g/dL Final    Hematocrit 07/02/2025 42.6  35.0 - 45.0 % Final    MCV 07/02/2025 96.4  80.0 - 100.0 fL Final    MCH 07/02/2025 30.8  27.0 - 33.0 pg Final    MCHC 07/02/2025 31.9 (L)  32.0 - 36.0 g/dL Final    RDW 07/02/2025 12.2  11.0 - 15.0 % Final    Platelets 07/02/2025 291  140 - 400 Thousand/uL Final    MPV 07/02/2025 10.4  7.5 - 12.5 fL Final    Neutrophils, Abs 07/02/2025 3,184  1,500 - 7,800 cells/uL Final    Lymph # 07/02/2025 2,175  850 - 3,900 cells/uL Final    Mono # 07/02/2025 360  200 - 950 cells/uL Final    Eos # 07/02/2025 52  15 - 500 cells/uL Final    Baso # 07/02/2025 29  0 - 200 cells/uL Final    Neutrophils Relative 07/02/2025 54.9  % Final    Lymph % 07/02/2025 37.5  % Final    Mono % 07/02/2025 6.2  % Final    Eosinophil % 07/02/2025 0.9  % Final    Basophil % 07/02/2025 0.5  % Final    Glucose 07/02/2025 132 (H)  65 - 99 mg/dL Final    BUN 07/02/2025 13  7 - 25 mg/dL Final    Creatinine 07/02/2025 0.74  0.50 - 1.05 mg/dL Final    eGFR 07/02/2025 90  > OR = 60 mL/min/1.73m2 Final    BUN/Creatinine Ratio 07/02/2025 SEE NOTE:  6 - 22 (calc) Final    Sodium 07/02/2025 138  135 - 146 mmol/L Final    Potassium 07/02/2025 4.4  3.5 - 5.3 mmol/L Final    Chloride 07/02/2025 102  98 - 110 mmol/L Final    CO2 07/02/2025 27  20 - 32 mmol/L Final    Calcium 07/02/2025 9.6  8.6 - 10.4 mg/dL Final    Total  Protein 2025 6.7  6.1 - 8.1 g/dL Final    Albumin 2025 4.4  3.6 - 5.1 g/dL Final    Globulin, Total 2025 2.3  1.9 - 3.7 g/dL (calc) Final    Albumin/Globulin Ratio 2025 1.9  1.0 - 2.5 (calc) Final    Total Bilirubin 2025 0.5  0.2 - 1.2 mg/dL Final    Alkaline Phosphatase 2025 68  37 - 153 U/L Final    AST 2025 30  10 - 35 U/L Final    ALT 2025 33 (H)  6 - 29 U/L Final    Cholesterol 2025 110  <200 mg/dL Final    HDL 2025 43 (L)  > OR = 50 mg/dL Final    Triglycerides 2025 107  <150 mg/dL Final    LDL Cholesterol 2025 48  mg/dL (calc) Final    HDL/Cholesterol Ratio 2025 2.6  <5.0 (calc) Final    Non HDL Chol. (LDL+VLDL) 2025 67  <130 mg/dL (calc) Final    Creatinine, Urine 2025 229  20 - 275 mg/dL Final    Microalb, Ur 2025 8.2  See Note: mg/dL Final    Microalb/Creat Ratio 2025 36 (H)  <30 mg/g creat Final    TSH w/reflex to FT4 2025 1.37  0.40 - 4.50 mIU/L Final   Office Visit on 2025   Component Date Value Ref Range Status    Hemoglobin A1C, POC 2025 8.4  % Final       Past Medical History:   Diagnosis Date    Diabetes mellitus     DVT (deep venous thrombosis)     Kidney stones     Kidney stones      Social History[1]  Past Surgical History:   Procedure Laterality Date    BREAST BIOPSY Right 1998     SECTION      GALLBLADDER SURGERY  2011    KIDNEY STONE SURGERY  2006    DC DILATION/CURETTAGE,DIAGNOSTIC      D & C times 2    SHOULDER SURGERY Right 2010    Reconstruction Dr. Madrigal    TONSILLECTOMY  1964     Family History   Problem Relation Name Age of Onset    Diabetes Mother Meredith Wyatt Darce     Heart disease Father Gil Wyatt        The 10-year CVD risk score (Tony et al., 2008) is: 4.8%    Values used to calculate the score:      Age: 64 years      Sex: Female      Diabetic: No      Tobacco smoker: No      Systolic Blood Pressure: 130 mmHg      Is BP treated:  "No      HDL Cholesterol: 43 mg/dL      Total Cholesterol: 110 mg/dL    Tests to Keep You Healthy    Mammogram: ORDERED BUT NOT SCHEDULED  Colon Cancer Screening: Met on 10/3/2024  Cervical Cancer Screening: DUE      Review of patient's allergies indicates:   Allergen Reactions    Opioids - morphine analogues     Codeine Nausea And Vomiting     Current Medications[2]    Review of Systems        Objective:      Vitals:    07/02/25 1413   BP: 130/88   Pulse: 93   Weight: 85.1 kg (187 lb 9.6 oz)   Height: 5' 8.5" (1.74 m)     Physical Exam  Vitals and nursing note reviewed.   Constitutional:       General: She is not in acute distress.     Appearance: Normal appearance. She is well-developed. She is not ill-appearing.   HENT:      Head: Normocephalic and atraumatic.      Right Ear: External ear normal.      Left Ear: External ear normal.      Mouth/Throat:      Lips: Pink.   Eyes:      General: No scleral icterus.  Neck:      Thyroid: No thyromegaly.      Vascular: No carotid bruit.   Cardiovascular:      Rate and Rhythm: Normal rate and regular rhythm.      Pulses:           Radial pulses are 2+ on the right side and 2+ on the left side.      Heart sounds: Normal heart sounds. No murmur heard.  Pulmonary:      Effort: Pulmonary effort is normal.      Breath sounds: Normal breath sounds.   Abdominal:      Palpations: Abdomen is soft.      Tenderness: There is no abdominal tenderness. There is no right CVA tenderness or left CVA tenderness.   Musculoskeletal:         General: Normal range of motion.      Cervical back: Normal range of motion.      Lumbar back: Normal. No spasms.      Right lower leg: No edema.      Left lower leg: No edema.   Skin:     General: Skin is warm and dry.      Capillary Refill: Capillary refill takes less than 2 seconds.   Neurological:      General: No focal deficit present.      Mental Status: She is alert and oriented to person, place, and time.      Cranial Nerves: No cranial nerve " deficit.      Sensory: Sensation is intact.      Gait: Gait is intact.   Psychiatric:         Attention and Perception: Attention normal.         Mood and Affect: Mood normal.         Speech: Speech normal.         Behavior: Behavior is cooperative.         Thought Content: Thought content does not include homicidal or suicidal ideation.                    Assessment:       1. Wellness examination    2. Diabetes mellitus due to underlying condition with hyperglycemia, without long-term current use of insulin    3. History of kidney stones    4. Encounter for lipid screening for cardiovascular disease    5. Encounter for screening mammogram for malignant neoplasm of breast         Plan:       Wellness examination  Counseled on age and gender appropriate medical preventative services, including cancer screenings, immunizations, overall nutritional health, need for a consistent exercise regimen and an overall push towards maintaining a vigorous and active lifestyle.    -     CBC Auto Differential; Future; Expected date: 07/02/2025  -     Comprehensive Metabolic Panel; Future; Expected date: 07/02/2025  -     Lipid Panel; Future; Expected date: 07/02/2025  -     TSH w/reflex to FT4; Future; Expected date: 07/02/2025    Encounter for lipid screening for cardiovascular disease  Labs for evaluation of health/monitoring of condition.   -     Lipid Panel; Future; Expected date: 07/02/2025    Encounter for screening mammogram for malignant neoplasm of breast  Patient of appropriate population group due for screening test.   -     Mammo Digital Screening Bilat w/ Tay (XPD); Future; Expected date: 07/02/2025    TYPE 2 DIABETES MELLITUS:  - Assessed the patient's A1C improvement from 8.4 three months ago to 7.3 currently.  - Discussed the goal of achieving A1C in the sixes for prevention of complications.  - Evaluated the effectiveness of current Ozempic dose (0.5 mg) for diabetes management and will continue at this dose.  -  Considered potential increase to 1 mg in future if needed for weight loss plateau or hunger.  - Ordered A1C test.    GASTROESOPHAGEAL REFLUX DISEASE:  - Explained relationship between Ozempic, slowed digestion, and potential for increased reflux.  - Savanna reports no nighttime reflux and regulates diet to avoid acidic foods.  - Recommend Pepcid for late nights when unable to avoid acidic foods.    CONSTIPATION:  - Explained relationship between Ozempic, slowed digestion, and potential for increased constipation.  - Savanna reports recent constipation, possibly due to stress or nerves.  - Prescribed Colace (docusate sodium) 200 mg twice daily for constipation, which should also help alleviate associated nausea.    URINARY CALCULUS (KIDNEY STONES):  - Assessed for signs of kidney stones due to patient's history.  - Savanna reports clear urine and occasional cramping, indicating possible kidney stone movement.  - Prescribed Flomax (tamsulosin) at bedtime as needed to help pass stones and Toradol (ketorolac) 10 mg every 6 hours as needed for kidney stone pain, limited to 5 days use, with 20 tablets provided.  - Recommend increased water intake and adding lemon to water for kidney stone prevention.  -     tamsulosin (FLOMAX) 0.4 mg Cap; Take 1 capsule (0.4 mg total) by mouth once daily.  Dispense: 30 capsule; Refill: 1  -     ketorolac (TORADOL) 10 mg tablet; Take 1 tablet (10 mg total) by mouth every 6 (six) hours as needed for Pain.  Dispense: 20 tablet; Refill: 0    WEIGHT MANAGEMENT:  - Assessed weight loss progress, noting the patient has lost 10 lbs, from 197 to 180.  - Current Ozempic dose (0.5 mg) appears effective for weight loss, with potential dose adjustment as discussed under diabetes management.    GENERAL HEALTH:  - Verified blood pressure is within normal limits today.  - Auscultated lungs and found them to be clear with no issues noted.         Follow up in about 6 months (around 1/2/2026) for Diabetic Check  Up.        This note was generated with the assistance of ambient listening technology. Verbal consent was obtained by the patient and accompanying visitor(s) for the recording of patient appointment to facilitate this note. I attest to having reviewed and edited the generated note for accuracy, though some syntax or spelling errors may persist. Please contact the author of this note for any clarification.      7/20/2025 Kellen Alberts         [1]   Social History  Socioeconomic History    Marital status:    Tobacco Use    Smoking status: Never    Smokeless tobacco: Never   Substance and Sexual Activity    Alcohol use: No    Drug use: No    Sexual activity: Yes     Partners: Male     Birth control/protection: Implant     Social Drivers of Health     Financial Resource Strain: Low Risk  (7/17/2025)    Overall Financial Resource Strain (CARDIA)     Difficulty of Paying Living Expenses: Not very hard   Food Insecurity: No Food Insecurity (7/17/2025)    Hunger Vital Sign     Worried About Running Out of Food in the Last Year: Never true     Ran Out of Food in the Last Year: Never true   Transportation Needs: No Transportation Needs (7/17/2025)    PRAPARE - Transportation     Lack of Transportation (Medical): No     Lack of Transportation (Non-Medical): No   Physical Activity: Sufficiently Active (7/17/2025)    Exercise Vital Sign     Days of Exercise per Week: 5 days     Minutes of Exercise per Session: 120 min   Stress: No Stress Concern Present (7/17/2025)    [employer] - Occupational Stress Questionnaire     Feeling of Stress : Not at all   Housing Stability: Low Risk  (7/17/2025)    Housing Stability Vital Sign     Unable to Pay for Housing in the Last Year: No     Number of Times Moved in the Last Year: 0     Homeless in the Last Year: No   [2]   Current Outpatient Medications:     aspirin (ECOTRIN) 81 MG EC tablet, Take 81 mg by mouth once daily., Disp: , Rfl:      Bifidobacterium infantis (ALIGN) 10.5 mg (10 million cell) Chew, Take 1 tablet by mouth once daily., Disp: , Rfl:     blood sugar diagnostic Strp, 60 strips by Misc.(Non-Drug; Combo Route) route 2 (two) times a day., Disp: 60 strip, Rfl: 0    blood-glucose meter kit, Use as instructed, Disp: 1 each, Rfl: 0    celecoxib (CELEBREX) 200 MG capsule, Take 1 capsule (200 mg total) by mouth daily as needed for Pain (arthritis)., Disp: 30 capsule, Rfl: 2    cholecalciferol, vitamin D3, (VITAMIN D3) 50 mcg (2,000 unit) Tab, Take 1 tablet by mouth once daily. , Disp: , Rfl:     cyanocobalamin-cobamamide 5,000-100 mcg Lozg, Place 1 lozenge under the tongue once daily., Disp: , Rfl:     cyclobenzaprine (FLEXERIL) 10 MG tablet, Take 1 tablet (10 mg total) by mouth nightly as needed for Muscle spasms., Disp: 30 tablet, Rfl: 2    magnesium 30 mg Tab, Take 30 mg by mouth once. , Disp: , Rfl:     metFORMIN (GLUCOPHAGE) 500 MG tablet, Take 2 tablets (1,000 mg total) by mouth 2 (two) times daily with meals., Disp: 360 tablet, Rfl: 3    multivitamin with minerals (HAIR,SKIN AND NAILS ORAL), Take 1 tablet by mouth once daily., Disp: , Rfl:     mupirocin (BACTROBAN) 2 % ointment, Apply topically 2 (two) times daily. Apply to affected area, Disp: , Rfl:     mupirocin (BACTROBAN) 2 % ointment, Apply topically 3 (three) times daily., Disp: 22 g, Rfl: 1    ondansetron (ZOFRAN) 4 MG tablet, Take 1 tablet (4 mg total) by mouth every 8 (eight) hours as needed for Nausea., Disp: 15 tablet, Rfl: 0    OZEMPIC 0.25 mg or 0.5 mg (2 mg/3 mL) pen injector, Inject 0.5 mg into the skin every 7 days., Disp: , Rfl:     rosuvastatin (CRESTOR) 20 MG tablet, Take 1 tablet (20 mg total) by mouth once daily., Disp: 90 tablet, Rfl: 3    vitamin E 400 UNIT capsule, Take 400 Units by mouth once daily., Disp: , Rfl:     zinc sulfate (ZINC-15 ORAL), Take 1 tablet by mouth once daily. , Disp: , Rfl:     ciprofloxacin HCl (CIPRO) 250 MG tablet, Take 1 tablet (250 mg  total) by mouth 2 (two) times daily. for 10 days, Disp: 20 tablet, Rfl: 0    predniSONE (DELTASONE) 20 MG tablet, Take 1 tablet (20 mg total) by mouth once daily. (Patient not taking: Reported on 7/2/2025), Disp: 3 tablet, Rfl: 0    tamsulosin (FLOMAX) 0.4 mg Cap, Take 1 capsule (0.4 mg total) by mouth once daily., Disp: 30 capsule, Rfl: 1

## 2025-07-14 ENCOUNTER — TELEPHONE (OUTPATIENT)
Dept: FAMILY MEDICINE | Facility: CLINIC | Age: 65
End: 2025-07-14
Payer: COMMERCIAL

## 2025-07-14 DIAGNOSIS — Z87.442 HISTORY OF KIDNEY STONES: Primary | ICD-10-CM

## 2025-07-14 NOTE — TELEPHONE ENCOUNTER
----- Message from Juan M Worley sent at 7/14/2025 11:58 AM CDT -----    ----- Message -----  From: Justyna Sarabia  Sent: 7/14/2025  11:56 AM CDT  To: Anselmo Lugo Staff    Patient calling in regarding to asking for a recommendation to a kidney stone provider.  [phone]

## 2025-07-14 NOTE — TELEPHONE ENCOUNTER
Spoke with pt states she currently has a kidney stone. She would like a referral for  tx. States she does have hx

## 2025-07-16 ENCOUNTER — TELEPHONE (OUTPATIENT)
Dept: FAMILY MEDICINE | Facility: CLINIC | Age: 65
End: 2025-07-16
Payer: COMMERCIAL

## 2025-07-16 NOTE — TELEPHONE ENCOUNTER
----- Message from Nurse Jaquez sent at 7/16/2025  1:41 PM CDT -----    ----- Message -----  From: Kellen Parekh  Sent: 7/16/2025   1:01 PM CDT  To: Anselmo Lugo Staff    Vm- 11:25- pt is calling about her tabitha gianni   [phone]

## 2025-07-16 NOTE — TELEPHONE ENCOUNTER
"Pt states she's just having "Stone pain"  States most times she does not know she has a stone. States her body is aching and "blacking out "sometimes.  states the other day pt had to be walked to her desk due to extreme pain and passing out. States her urine stream is not the best. Pt advised to go to the ER. Pt was not happy states the last time they did nothing for her. Advised that if she is needing helping walking due to passing out episodes then she needs to go to the ER. Pt voiced understanding.   "

## 2025-07-16 NOTE — TELEPHONE ENCOUNTER
Spoke with pt states Pat Ott first available is 8/22/25. Pt states she needs an appt now. Anyone that could see her sooner?

## 2025-07-17 ENCOUNTER — TELEPHONE (OUTPATIENT)
Dept: UROLOGY | Facility: CLINIC | Age: 65
End: 2025-07-17
Payer: COMMERCIAL

## 2025-07-17 NOTE — TELEPHONE ENCOUNTER
Physician Referral  Received: Today  Pat Boogie Staff  Pt thought she scheduled to be seen yesterday but scheduled next available at the end of August. She is having issues with her kidney stones again and states last time this happened she almost  because they caused a blockage in her kidney. Pt would like to be seen urgently or would like you to order an ultrasound for her so she can see how serious the kidney stones are this time. She states she was told just to go to the ER and then the ER does nothing and tells her to f/u with her urologist. Can you please call her to discuss being seen urgently or order an kidney us for her? Please call pt to discuss.    Thanks,      [facility] Special[facility].      -returned pts call and informed soonest appt is a virtual visit for  as appt is needed for any imaging order.   Pts last appt was   I did consult with MD aggarwal for soonest virtual with np ok   Pt accepted appt

## 2025-07-18 ENCOUNTER — OFFICE VISIT (OUTPATIENT)
Dept: UROLOGY | Facility: CLINIC | Age: 65
End: 2025-07-18
Payer: COMMERCIAL

## 2025-07-18 ENCOUNTER — HOSPITAL ENCOUNTER (OUTPATIENT)
Dept: RADIOLOGY | Facility: HOSPITAL | Age: 65
Discharge: HOME | End: 2025-07-18
Attending: NURSE PRACTITIONER
Payer: COMMERCIAL

## 2025-07-18 DIAGNOSIS — R52 BODY ACHES: ICD-10-CM

## 2025-07-18 DIAGNOSIS — N22 CALCULUS OF URINARY TRACT IN DISEASES CLASSIFIED ELSEWHERE: ICD-10-CM

## 2025-07-18 DIAGNOSIS — M54.9 BACK PAIN, UNSPECIFIED BACK LOCATION, UNSPECIFIED BACK PAIN LATERALITY, UNSPECIFIED CHRONICITY: ICD-10-CM

## 2025-07-18 DIAGNOSIS — N22 CALCULUS OF URINARY TRACT IN DISEASES CLASSIFIED ELSEWHERE: Primary | ICD-10-CM

## 2025-07-18 DIAGNOSIS — Z87.442 HISTORY OF KIDNEY STONES: ICD-10-CM

## 2025-07-18 PROCEDURE — 74176 CT ABD & PELVIS W/O CONTRAST: CPT | Mod: 26,,, | Performed by: RADIOLOGY

## 2025-07-18 PROCEDURE — 74176 CT ABD & PELVIS W/O CONTRAST: CPT | Mod: TC

## 2025-07-18 RX ORDER — CIPROFLOXACIN 250 MG/1
250 TABLET, FILM COATED ORAL 2 TIMES DAILY
Qty: 20 TABLET | Refills: 0 | Status: SHIPPED | OUTPATIENT
Start: 2025-07-18 | End: 2025-07-28

## 2025-07-18 NOTE — PROGRESS NOTES
"The patient location is: [place]  Date of Service: 7/18/25  The chief complaint leading to consultation is: "I think I am trying to pass a kidney stone"    Visit type: Virtual visit with Real Time synchronous AUDIO and VIDEO which in the middle of visit was switched to Audio Only due to pt's cell phone service with video.  Total time spent in medical discussion with patient: 15 minutes  Total time spent on date of the encounter: 20 minutes     Each patient to whom he or she provides medical services by telemedicine is:    (1) informed of the relationship between the physician and patient and the respective role of any other health care provider with respect to management of the patient; and   (2) notified that he or she may decline to receive medical services by telemedicine and may withdraw from such care at any time.        [facility] Consult Note - [place] [facility]  Staff: POORNIMA Cordova  PCP: CHAPITO Alberts     Chief Complaint: Kidney Stones; Back pain/Body Aches    Subjective:        HPI: Savanna West is a 64 y.o. female presents with who presents via VV for kidney stones. Last clinic visit was with Pat Ott NP on 8/9/2023 and with Dr. Valencia in 2021.    7/18/25 VV/Audio Visit w/ NP-Tsaha  Pt c/o back pain and body aches x one week.  Pt believes she is trying to pass another kidney stone.   No gross hematuria noted.  No recent lab, urine, or imaging tests have been ordered or completed.  However her PCP office did start her on Flomax 0.4 mg po daily for her symptoms last week.     8/9/23  Patient presents for right flank/back pain that started last week. She called 8/4/23 with c/o chills, "flu like" /UTI symptoms, and flank pain and was instructed to go to ED but did not go. Today she does feel better but continues with flank/back pain and urinary frequency/urgency. She is taking AZO. Denies gross hematuria. No fever.      Most recent imaging 9/4/2022 CT RSS  There is mild left " hydroureteronephrosis secondary to a 3 mm left ureteral vesicle junction stone. Previously the stone was in the left ureteropelvic junction. There is a 2 mm stone in the mid left kidney. There is mild perinephric stranding.  No right renal stones      21  Patient presented to the ED on 3/19 with complaints of dark urine and bilateral flank pain. CTRSS at that time negative for obstructing stone. Urine culture grew E coli.   Does not get recurrent UTIs.      She has a punctate left renal stone which appears to be within the parenchyma.   Also has hx of prior stones and has required stent placement and ESWL in . No stone episodes since then.      Drinks 40 oz of water, 1 glass of iced tea, coffee in the am.   No urinary symptoms today.   No hematuria.     REVIEW OF SYSTEMS:  A comprehensive 10 system review was performed and is negative except as noted above in HPI    PMHx:  Past Medical History:   Diagnosis Date    Diabetes mellitus     DVT (deep venous thrombosis)     Kidney stones     Kidney stones      PSHx:  Past Surgical History:   Procedure Laterality Date    BREAST BIOPSY Right      SECTION      GALLBLADDER SURGERY  2011    KIDNEY STONE SURGERY  2006    UT DILATION/CURETTAGE,DIAGNOSTIC      D & C times 2    SHOULDER SURGERY Right 2010    Reconstruction Dr. Madrigal    TONSILLECTOMY  1964     Allergies:  Opioids - morphine analogues and Codeine    Medications: reviewed   Objective:   There were no vitals filed for this visit.    Physical Exam *Audio Visit*      Assessment:       1. Calculus of urinary tract in diseases classified elsewhere    2. History of kidney stones    3. Back pain, unspecified back location, unspecified back pain laterality, unspecified chronicity    4. Body aches          Plan:     CBC, BMP to be performed at OP Lab today to check for infection and kidney function at this time.  UA, UA Micro, Urine Culture to be collected today at OP Lab to rule out UTI at  this time.  In the meantime I will prescribe Cipro 250 mg BID until we receive results next week.  CT Renal Stone Study to be done for further evaluation at this time and to verify if her symptoms are currently  related.  Pt to continue Flomax 0.4 mg po daily and OTC Aleve at this time like previous for control of symptoms.    F/u We will contact pt once we receive results for further evaluation.  However pt was advised if her symptoms worsen over the weekend then she would need to go to [facility] ED for further workup.  Pt verbalized understanding.    [facility]: POORNIMA Piña    Portions of this note was generated with the assistance of ambient listening technology. Verbal consent was obtained by the patient and accompanying visitor(s) for the recording of patient appointment to facilitate this note. I attest to having reviewed and edited the generated note for accuracy, though some syntax or spelling errors may persist. Please contact the author of this note for any clarification.     Visit today included increased complexity associated with the care of the episodic problem addressed and managing the longitudinal care of the patient due to the serious and/or complex managed problem(s) .

## 2025-07-23 DIAGNOSIS — Z78.0 MENOPAUSE: ICD-10-CM

## 2025-07-25 ENCOUNTER — OFFICE VISIT (OUTPATIENT)
Dept: UROLOGY | Facility: CLINIC | Age: 65
End: 2025-07-25
Payer: COMMERCIAL

## 2025-07-25 VITALS — BODY MASS INDEX: 26.34 KG/M2 | HEIGHT: 70 IN | WEIGHT: 184 LBS

## 2025-07-25 DIAGNOSIS — M54.9 BACK PAIN, UNSPECIFIED BACK LOCATION, UNSPECIFIED BACK PAIN LATERALITY, UNSPECIFIED CHRONICITY: ICD-10-CM

## 2025-07-25 DIAGNOSIS — R10.9 RIGHT FLANK PAIN: Primary | ICD-10-CM

## 2025-07-25 DIAGNOSIS — Z87.442 HISTORY OF KIDNEY STONES: ICD-10-CM

## 2025-07-25 DIAGNOSIS — R52 BODY ACHES: ICD-10-CM

## 2025-07-25 PROCEDURE — 3288F FALL RISK ASSESSMENT DOCD: CPT | Mod: CPTII,S$GLB,, | Performed by: NURSE PRACTITIONER

## 2025-07-25 PROCEDURE — 1160F RVW MEDS BY RX/DR IN RCRD: CPT | Mod: CPTII,S$GLB,, | Performed by: NURSE PRACTITIONER

## 2025-07-25 PROCEDURE — 99999 PR PBB SHADOW E&M-EST. PATIENT-LVL IV: CPT | Mod: PBBFAC,,, | Performed by: NURSE PRACTITIONER

## 2025-07-25 PROCEDURE — 1159F MED LIST DOCD IN RCRD: CPT | Mod: CPTII,S$GLB,, | Performed by: NURSE PRACTITIONER

## 2025-07-25 PROCEDURE — 3008F BODY MASS INDEX DOCD: CPT | Mod: CPTII,S$GLB,, | Performed by: NURSE PRACTITIONER

## 2025-07-25 PROCEDURE — 99213 OFFICE O/P EST LOW 20 MIN: CPT | Mod: S$GLB,,, | Performed by: NURSE PRACTITIONER

## 2025-07-25 PROCEDURE — 3051F HG A1C>EQUAL 7.0%<8.0%: CPT | Mod: CPTII,S$GLB,, | Performed by: NURSE PRACTITIONER

## 2025-07-25 PROCEDURE — 1101F PT FALLS ASSESS-DOCD LE1/YR: CPT | Mod: CPTII,S$GLB,, | Performed by: NURSE PRACTITIONER

## 2025-07-25 NOTE — PROGRESS NOTES
"[facility] Note  Staff: POORNIMA Gonzalez    PCP: JHON Lugo    Chief Complaint: F/UP-Kidney Stones    Subjective:        HPI: Savanna West is a 65 y.o. female presents today in office for review of recent  events.    Pt was urgently last evaluated by me via VV/Audio Visit on 7/18/25.  Last clinic visit was with Pat Ott NP on 8/9/2023 and with Dr. Valencia in 2021.     7/25/25:  Pt in office today regarding hx kidney stones, back pain at this time.  Pt had recently (Last week) presented via VV/Audio Visit for acute back pain, body aches thought she was passing at kidney stone.  Stat CT imaging showed no obstructions at the time.  Urine culture showed multiple organisms.  Pt is currently on Cipro for possible UTI at this time.    7/18/25:  CT RSS  IMPRESSION:  Bilateral nonobstructive nephrolithiasis.  Hepatic steatosis.  Cholecystectomy.    7/18/25 VV/Audio Visit w/ NP-Tahsa  Pt c/o back pain and body aches x one week.  Pt believes she is trying to pass another kidney stone.   No gross hematuria noted.  No recent lab, urine, or imaging tests have been ordered or completed.  However her PCP office did start her on Flomax 0.4 mg po daily for her symptoms last week.     8/9/23  Patient presents for right flank/back pain that started last week. She called 8/4/23 with c/o chills, "flu like" /UTI symptoms, and flank pain and was instructed to go to ED but did not go. Today she does feel better but continues with flank/back pain and urinary frequency/urgency. She is taking AZO. Denies gross hematuria. No fever.      Most recent imaging 9/4/2022 CT RSS  There is mild left hydroureteronephrosis secondary to a 3 mm left ureteral vesicle junction stone. Previously the stone was in the left ureteropelvic junction. There is a 2 mm stone in the mid left kidney. There is mild perinephric stranding.  No right renal stones      4/27/21  Patient presented to the ED on 3/19 with complaints of dark " urine and bilateral flank pain. CTRSS at that time negative for obstructing stone. Urine culture grew E coli.   Does not get recurrent UTIs.      She has a punctate left renal stone which appears to be within the parenchyma.   Also has hx of prior stones and has required stent placement and ESWL in . No stone episodes since then.      Drinks 40 oz of water, 1 glass of iced tea, coffee in the am.   No urinary symptoms today.   No hematuria.     REVIEW OF SYSTEMS:  A comprehensive 10 system review was performed and is negative except as noted above in HPI    PMHx:  Past Medical History:   Diagnosis Date    Diabetes mellitus     DVT (deep venous thrombosis)     Kidney stones     Kidney stones        PSHx:  Past Surgical History:   Procedure Laterality Date    BREAST BIOPSY Right      SECTION      GALLBLADDER SURGERY  2011    KIDNEY STONE SURGERY  2006    CO DILATION/CURETTAGE,DIAGNOSTIC      D & C times 2    SHOULDER SURGERY Right 2010    Reconstruction Dr. Madrigal    TONSILLECTOMY  1964       Allergies:  Opioids - morphine analogues and Codeine    Medications: reviewed   Objective:   There were no vitals filed for this visit.    Physical Exam  Constitutional:       Appearance: She is well-developed.   HENT:      Head: Normocephalic and atraumatic.   Eyes:      Conjunctiva/sclera: Conjunctivae normal.      Pupils: Pupils are equal, round, and reactive to light.   Cardiovascular:      Rate and Rhythm: Normal rate and regular rhythm.      Heart sounds: Normal heart sounds.   Pulmonary:      Effort: Pulmonary effort is normal.      Breath sounds: Normal breath sounds.   Abdominal:      General: Bowel sounds are normal.      Palpations: Abdomen is soft.   Musculoskeletal:         General: Normal range of motion.      Cervical back: Normal range of motion and neck supple.   Skin:     General: Skin is warm and dry.   Neurological:      Mental Status: She is alert and oriented to person, place, and  time.      Deep Tendon Reflexes: Reflexes are normal and symmetric.   Psychiatric:         Behavior: Behavior normal.         Thought Content: Thought content normal.         Judgment: Judgment normal.       Assessment:       1. Right flank pain    2. History of kidney stones    3. Back pain, unspecified back location, unspecified back pain laterality, unspecified chronicity    4. Body aches          Plan:      Continue Cipro at this time until completion.  Kidney stone information given on conclusion of ov today.  Suggested to this pt today to f/up also with her PCP office for further evaluation at this time to rule out other Medical issues related to her current symptoms other than  relation.    F/u with MD to discuss future treatment options related to bilateral kidney stones at this time.    [facility]: Active    Elidia Cordova, KRUNALC

## 2025-07-28 ENCOUNTER — PATIENT MESSAGE (OUTPATIENT)
Dept: ADMINISTRATIVE | Facility: HOSPITAL | Age: 65
End: 2025-07-28
Payer: COMMERCIAL

## 2025-07-31 ENCOUNTER — OFFICE VISIT (OUTPATIENT)
Dept: UROLOGY | Facility: CLINIC | Age: 65
End: 2025-07-31
Payer: COMMERCIAL

## 2025-07-31 DIAGNOSIS — Z87.442 HISTORY OF KIDNEY STONES: Primary | ICD-10-CM

## 2025-07-31 NOTE — PROGRESS NOTES
The patient location is: [place]  The chief complaint leading to consultation is: kidney stones    Visit type: audiovisual    Face to Face time with patient: 15  30 minutes of total time spent on the encounter, which includes face to face time and non-face to face time preparing to see the patient (eg, review of tests), Obtaining and/or reviewing separately obtained history, Documenting clinical information in the electronic or other health record, Independently interpreting results (not separately reported) and communicating results to the patient/family/caregiver, or Care coordination (not separately reported).         Each patient to whom he or she provides medical services by telemedicine is:  (1) informed of the relationship between the physician and patient and the respective role of any other health care provider with respect to management of the patient; and (2) notified that he or she may decline to receive medical services by telemedicine and may withdraw from such care at any time.    Notes:     Savanna West is a 65 y.o. female who presents to discuss kidney stones.     Also has hx of prior stones and has required stent placement and ESWL in 2005.     CT 7/18/25:   There is a 4 mm stone of the lower pole of the left kidney. A few additional punctate stones are present bilaterally. There is no ureterolithiasis or hydroureteronephrosis. A 2.5 cm simple cyst of the right kidney is present.     Last documented UTI in 2022.  Urine culture 7/18/25: MO    Has been having right sided lower back pain under her rib cage. Comes and goes, worse with movement.   Felt as though she was weak and fatigue, with flu like symptoms.     Sometimes she has slow stream and difficulty emptying.   No dysuria. No gross hematuria.     Has a BM every day.     Plan:   - no stones seen on right side. Imaging reviewed with patient.   - left sided stones are very small and non obstructed. Recommended observation of these  -  Recommended follow up with Dr. Lugo to see if any other causes of her right sided lower back pain   - Can follow up with me in 1 year with KUB or sooner if needed.       Archana Valencia MD  Urology Department

## 2025-09-03 ENCOUNTER — TELEPHONE (OUTPATIENT)
Dept: FAMILY MEDICINE | Facility: CLINIC | Age: 65
End: 2025-09-03
Payer: COMMERCIAL